# Patient Record
Sex: FEMALE | Race: WHITE | Employment: OTHER | URBAN - METROPOLITAN AREA
[De-identification: names, ages, dates, MRNs, and addresses within clinical notes are randomized per-mention and may not be internally consistent; named-entity substitution may affect disease eponyms.]

---

## 2017-02-17 ENCOUNTER — RADIANT APPOINTMENT (OUTPATIENT)
Dept: GENERAL RADIOLOGY | Facility: CLINIC | Age: 82
End: 2017-02-17
Attending: INTERNAL MEDICINE
Payer: MEDICARE

## 2017-02-17 ENCOUNTER — OFFICE VISIT (OUTPATIENT)
Dept: FAMILY MEDICINE | Facility: CLINIC | Age: 82
End: 2017-02-17
Payer: MEDICARE

## 2017-02-17 VITALS
OXYGEN SATURATION: 93 % | DIASTOLIC BLOOD PRESSURE: 66 MMHG | WEIGHT: 122 LBS | TEMPERATURE: 97.7 F | HEART RATE: 61 BPM | BODY MASS INDEX: 21.62 KG/M2 | HEIGHT: 63 IN | SYSTOLIC BLOOD PRESSURE: 140 MMHG

## 2017-02-17 DIAGNOSIS — I10 ESSENTIAL HYPERTENSION: ICD-10-CM

## 2017-02-17 DIAGNOSIS — R41.89 COGNITIVE IMPAIRMENT: ICD-10-CM

## 2017-02-17 DIAGNOSIS — G44.209 TENSION HEADACHE: ICD-10-CM

## 2017-02-17 DIAGNOSIS — Z00.00 ROUTINE GENERAL MEDICAL EXAMINATION AT A HEALTH CARE FACILITY: ICD-10-CM

## 2017-02-17 DIAGNOSIS — M81.0 SENILE OSTEOPOROSIS: ICD-10-CM

## 2017-02-17 DIAGNOSIS — I25.10 CORONARY ARTERY DISEASE INVOLVING NATIVE HEART WITHOUT ANGINA PECTORIS, UNSPECIFIED VESSEL OR LESION TYPE: ICD-10-CM

## 2017-02-17 DIAGNOSIS — E78.5 HYPERLIPIDEMIA LDL GOAL <70: ICD-10-CM

## 2017-02-17 DIAGNOSIS — Z00.00 ROUTINE GENERAL MEDICAL EXAMINATION AT A HEALTH CARE FACILITY: Primary | ICD-10-CM

## 2017-02-17 LAB
ERYTHROCYTE [DISTWIDTH] IN BLOOD BY AUTOMATED COUNT: 13.5 % (ref 10–15)
ERYTHROCYTE [SEDIMENTATION RATE] IN BLOOD BY WESTERGREN METHOD: 16 MM/H (ref 0–30)
HCT VFR BLD AUTO: 41.2 % (ref 35–47)
HGB BLD-MCNC: 13 G/DL (ref 11.7–15.7)
MCH RBC QN AUTO: 29.8 PG (ref 26.5–33)
MCHC RBC AUTO-ENTMCNC: 31.6 G/DL (ref 31.5–36.5)
MCV RBC AUTO: 95 FL (ref 78–100)
PLATELET # BLD AUTO: 270 10E9/L (ref 150–450)
RBC # BLD AUTO: 4.36 10E12/L (ref 3.8–5.2)
WBC # BLD AUTO: 5.2 10E9/L (ref 4–11)

## 2017-02-17 PROCEDURE — 80053 COMPREHEN METABOLIC PANEL: CPT | Performed by: INTERNAL MEDICINE

## 2017-02-17 PROCEDURE — 85652 RBC SED RATE AUTOMATED: CPT | Performed by: INTERNAL MEDICINE

## 2017-02-17 PROCEDURE — 36415 COLL VENOUS BLD VENIPUNCTURE: CPT | Performed by: INTERNAL MEDICINE

## 2017-02-17 PROCEDURE — 99212 OFFICE O/P EST SF 10 MIN: CPT | Mod: 25 | Performed by: INTERNAL MEDICINE

## 2017-02-17 PROCEDURE — G0438 PPPS, INITIAL VISIT: HCPCS | Performed by: INTERNAL MEDICINE

## 2017-02-17 PROCEDURE — 85027 COMPLETE CBC AUTOMATED: CPT | Performed by: INTERNAL MEDICINE

## 2017-02-17 PROCEDURE — 80061 LIPID PANEL: CPT | Performed by: INTERNAL MEDICINE

## 2017-02-17 PROCEDURE — 71020 XR CHEST 2 VW: CPT

## 2017-02-17 NOTE — PATIENT INSTRUCTIONS
Preventive Health Recommendations    Female Ages 65 +    Yearly exam:     See your health care provider every year in order to  o Review health changes.   o Discuss preventive care.    o Review your medicines if your doctor has prescribed any.      You no longer need a yearly Pap test unless you've had an abnormal Pap test in the past 10 years. If you have vaginal symptoms, such as bleeding or discharge, be sure to talk with your provider about a Pap test.      Every 1 to 2 years, have a mammogram.  If you are over 69, talk with your health care provider about whether or not you want to continue having screening mammograms.      Every 10 years, have a colonoscopy. Or, have a yearly FIT test (stool test). These exams will check for colon cancer.       Have a cholesterol test every 5 years, or more often if your doctor advises it.       Have a diabetes test (fasting glucose) every three years. If you are at risk for diabetes, you should have this test more often.       At age 65, have a bone density scan (DEXA) to check for osteoporosis (brittle bone disease).    Shots:    Get a flu shot each year.    Get a tetanus shot every 10 years.    Talk to your doctor about your pneumonia vaccines. There are now two you should receive - Pneumovax (PPSV 23) and Prevnar (PCV 13).    Talk to your doctor about the shingles vaccine.    Talk to your doctor about the hepatitis B vaccine.    Nutrition:     Eat at least 5 servings of fruits and vegetables each day.      Eat whole-grain bread, whole-wheat pasta and brown rice instead of white grains and rice.      Talk to your provider about Calcium and Vitamin D.     Lifestyle    Exercise at least 150 minutes a week (30 minutes a day, 5 days a week). This will help you control your weight and prevent disease.      Limit alcohol to one drink per day.      No smoking.       Wear sunscreen to prevent skin cancer.       See your dentist twice a year for an exam and cleaning.      See your  eye doctor every 1 to 2 years to screen for conditions such as glaucoma, macular degeneration and cataracts.    (Z00.00) Routine general medical examination at a health care facility  (primary encounter diagnosis)  Comment: For routine exam, we will draw labs as ordered, cholesterol, diabetes mellitus check, liver function, renal function,.  We will also update vaccination history  Plan: CBC with platelets, Lipid panel reflex to         direct LDL, Comprehensive metabolic panel, XR         Chest 2 Views            (E78.5) Hyperlipidemia LDL goal <70  Comment: We will check fasting lipid panel today  Plan:     (I25.10) Coronary artery disease involving native heart without angina pectoris, unspecified vessel or lesion type  Comment: Stable - no recent concerning chest pain  Plan:     (I10) Essential hypertension  Comment: blood pressure is a bit elevated today, but due to some orthostatic measurements at home we will keep blood pressure medications at the same dose lisinopril 10 mg and amlodipine 5  Plan:     (R41.89) Cognitive impairment  Comment: doing very well  Plan:     (M81.0) Senile osteoporosis  Comment: doing very well  Plan:     (G44.209) Tension headache  Comment: as above we will check ESR to monitor for possible temporal arteritis and continue to stay hydrated   Plan: ESR: Erythrocyte sedimentation rate

## 2017-02-17 NOTE — MR AVS SNAPSHOT
After Visit Summary   2/17/2017    Aura Broderick    MRN: [id number]           Patient Information     Date Of Birth          9/25/1926        Visit Information        Provider Department      2/17/2017 1:00 PM Mian Ramos MD [facility]        Today's Diagnoses     Routine general medical examination at a health care facility    -  1    Hyperlipidemia LDL goal <70        Coronary artery disease involving native heart without angina pectoris, unspecified vessel or lesion type        Essential hypertension        Cognitive impairment        Senile osteoporosis        Tension headache          Care Instructions      Preventive Health Recommendations    Female Ages 65 +    Yearly exam:     See your health care provider every year in order to  o Review health changes.   o Discuss preventive care.    o Review your medicines if your doctor has prescribed any.      You no longer need a yearly Pap test unless you've had an abnormal Pap test in the past 10 years. If you have vaginal symptoms, such as bleeding or discharge, be sure to talk with your provider about a Pap test.      Every 1 to 2 years, have a mammogram.  If you are over 69, talk with your health care provider about whether or not you want to continue having screening mammograms.      Every 10 years, have a colonoscopy. Or, have a yearly FIT test (stool test). These exams will check for colon cancer.       Have a cholesterol test every 5 years, or more often if your doctor advises it.       Have a diabetes test (fasting glucose) every three years. If you are at risk for diabetes, you should have this test more often.       At age 65, have a bone density scan (DEXA) to check for osteoporosis (brittle bone disease).    Shots:    Get a flu shot each year.    Get a tetanus shot every 10 years.    Talk to your doctor about your pneumonia vaccines. There are now two you should receive - Pneumovax (PPSV 23) and Prevnar (PCV  13).    Talk to your doctor about the shingles vaccine.    Talk to your doctor about the hepatitis B vaccine.    Nutrition:     Eat at least 5 servings of fruits and vegetables each day.      Eat whole-grain bread, whole-wheat pasta and brown rice instead of white grains and rice.      Talk to your provider about Calcium and Vitamin D.     Lifestyle    Exercise at least 150 minutes a week (30 minutes a day, 5 days a week). This will help you control your weight and prevent disease.      Limit alcohol to one drink per day.      No smoking.       Wear sunscreen to prevent skin cancer.       See your dentist twice a year for an exam and cleaning.      See your eye doctor every 1 to 2 years to screen for conditions such as glaucoma, macular degeneration and cataracts.    (Z00.00) Routine general medical examination at a health care facility  (primary encounter diagnosis)  Comment: For routine exam, we will draw labs as ordered, cholesterol, diabetes mellitus check, liver function, renal function,.  We will also update vaccination history  Plan: CBC with platelets, Lipid panel reflex to         direct LDL, Comprehensive metabolic panel, XR         Chest 2 Views            (E78.5) Hyperlipidemia LDL goal <70  Comment: We will check fasting lipid panel today  Plan:     (I25.10) Coronary artery disease involving native heart without angina pectoris, unspecified vessel or lesion type  Comment: Stable - no recent concerning chest pain  Plan:     (I10) Essential hypertension  Comment: blood pressure is a bit elevated today, but due to some orthostatic measurements at home we will keep blood pressure medications at the same dose lisinopril 10 mg and amlodipine 5  Plan:     (R41.89) Cognitive impairment  Comment: doing very well  Plan:     (M81.0) Senile osteoporosis  Comment: doing very well  Plan:     (G44.209) Tension headache  Comment: as above we will check ESR to monitor for possible temporal arteritis and continue to stay  "hydrated   Plan: ESR: Erythrocyte sedimentation rate                    Follow-ups after your visit        Future tests that were ordered for you today     Open Future Orders        Priority Expected Expires Ordered    XR Chest 2 Views Routine 2017            Who to contact     If you have questions or need follow up information about today's clinic visit or your schedule please contact [facility] directly at [phone].  Normal or non-critical lab and imaging results will be communicated to you by [employer], letter or phone within 4 business days after the clinic has received the results. If you do not hear from us within 7 days, please contact the clinic through [employer] or phone. If you have a critical or abnormal lab result, we will notify you by phone as soon as possible.  Submit refill requests through [employer] or call your pharmacy and they will forward the refill request to us. Please allow 3 business days for your refill to be completed.          Additional Information About Your Visit        [employer] Information     [employer]ika lets you send messages to your doctor, view your test results, renew your prescriptions, schedule appointments and more. To sign up, go to [email or web address] . Click on \"Log in\" on the left side of the screen, which will take you to the Welcome page. Then click on \"Sign up Now\" on the right side of the page.     You will be asked to enter the access code listed below, as well as some personal information. Please follow the directions to create your username and password.     Your access code is: [email or web address]  Expires: 2017  1:54 PM     Your access code will  in 90 days. If you need help or a new code, please call your [facility] clinic or [phone].        Care EveryWhere ID     This is your Care EveryWhere ID. This could be used by other organizations to access your [facility] medical records  [id number]        Your Vitals Were     " "Pulse Temperature Height Pulse Oximetry BMI (Body Mass Index)       61 97.7  F (36.5  C) (Tympanic) 5' 3\" (1.6 m) 93% 21.61 kg/m2        Blood Pressure from Last 3 Encounters:   02/17/17 140/66   05/25/16 176/78   04/12/16 114/62    Weight from Last 3 Encounters:   02/17/17 122 lb (55.3 kg)   05/25/16 116 lb (52.6 kg)   04/12/16 116 lb (52.6 kg)              We Performed the Following     CBC with platelets     Comprehensive metabolic panel     ESR: Erythrocyte sedimentation rate     Lipid panel reflex to direct LDL          Today's Medication Changes          These changes are accurate as of: 2/17/17  1:54 PM.  If you have any questions, ask your nurse or doctor.               These medicines have changed or have updated prescriptions.        Dose/Directions    lactobacillus rhamnosus (GG) capsule   This may have changed:  when to take this   Used for:  Diarrhea        Dose:  1 capsule   Take 1 capsule by mouth 3 times daily (before meals)   Quantity:  50 capsule   Refills:  1                Primary Care Provider Office Phone # Fax #    Mian Ramos [phone]       [facility] [address] [facility] [address]  [place] [address]        Thank you!     Thank you for choosing [facility]  for your care. Our goal is always to provide you with excellent care. Hearing back from our patients is one way we can continue to improve our services. Please take a few minutes to complete the written survey that you may receive in the mail after your visit with us. Thank you!             Your Updated Medication List - Protect others around you: Learn how to safely use, store and throw away your medicines at www.disposemymeds.org.          This list is accurate as of: 2/17/17  1:54 PM.  Always use your most recent med list.                   Brand Name Dispense Instructions for use    amLODIPine 5 MG tablet    NORVASC    90 tablet    Take 1 tablet (5 mg) by mouth daily       aspirin 81 MG EC " tablet     50 tablet    Take 1 tablet (81 mg) by mouth daily       lactobacillus rhamnosus (GG) capsule     50 capsule    Take 1 capsule by mouth 3 times daily (before meals)       lisinopril 10 MG tablet    PRINIVIL/ZESTRIL    90 tablet    TAKE 1 TABLET BY MOUTH EVERY DAY       multivitamin, therapeutic Tabs tablet      Take 1 tablet by mouth 2 times daily       senna-docusate 8.6-50 MG per tablet    SENOKOT-S;PERICOLACE     Take 1 tablet by mouth At Bedtime       simvastatin 40 MG tablet    ZOCOR    90 tablet    TAKE 1 TABLET(40 MG) BY MOUTH AT BEDTIME       VITAMIN D (CHOLECALCIFEROL) PO      Take 2,000 Units by mouth daily

## 2017-02-17 NOTE — LETTER
[facility]  [address] [facility] [address]  [place] [address]  [phone]                                                                                               Date: 2/20/2017    Aura Broderick                                                                               [address]  [place] [address]              Dear Aura,    I had the opportunity to review your recent labs and a summary of your labs reads as follows:    Your complete blood counts show no sign of anemia, normal white blood cell count and platelets.  Your comprehensive metabolic panel showed stable slightly impaired renal function, normal liver function, and stable fasting blood glucose indicating no evidence of diabetes mellitus.  Your fasting lipid panel show  - normal HDL (good) cholesterol -as your goal is greater than 40  - low LDL (bad) cholesterol as your goal is less than 70  - normal triglyceride levels  Your ESR also was within normal limits indicating no evidence of temporal arteritis  Enclosed is a copy of your results.      It was a pleasure to see you at your last appointment. If you have any questions, please feel free to call myself or my nurse at [phone].          Sincerely,    Mian Ramos MD/ Lakeisha MARTINEZ CMA  Results for orders placed or performed in visit on 02/17/17   CBC with platelets   Result Value Ref Range    WBC 5.2 4.0 - 11.0 10e9/L    RBC Count 4.36 3.8 - 5.2 10e12/L    Hemoglobin 13.0 11.7 - 15.7 g/dL    Hematocrit 41.2 35.0 - 47.0 %    MCV 95 78 - 100 fl    MCH 29.8 26.5 - 33.0 pg    MCHC 31.6 31.5 - 36.5 g/dL    RDW 13.5 10.0 - 15.0 %    Platelet Count 270 150 - 450 10e9/L   Lipid panel reflex to direct LDL   Result Value Ref Range    Cholesterol 150 <200 mg/dL    Triglycerides 81 <150 mg/dL    HDL Cholesterol 65 >49 mg/dL    LDL Cholesterol Calculated 69 <100 mg/dL    Non HDL Cholesterol 85 <130 mg/dL   Comprehensive metabolic panel   Result Value Ref Range     Sodium 142 133 - 144 mmol/L    Potassium 4.2 3.4 - 5.3 mmol/L    Chloride 106 94 - 109 mmol/L    Carbon Dioxide 25 20 - 32 mmol/L    Anion Gap 11 3 - 14 mmol/L    Glucose 104 (H) 70 - 99 mg/dL    Urea Nitrogen 40 (H) 7 - 30 mg/dL    Creatinine 1.26 (H) 0.52 - 1.04 mg/dL    GFR Estimate 40 (L) >60 mL/min/1.7m2    GFR Estimate If Black 48 (L) >60 mL/min/1.7m2    Calcium 9.3 8.5 - 10.1 mg/dL    Bilirubin Total 0.5 0.2 - 1.3 mg/dL    Albumin 4.0 3.4 - 5.0 g/dL    Protein Total 6.8 6.8 - 8.8 g/dL    Alkaline Phosphatase 78 40 - 150 U/L    ALT 15 0 - 50 U/L    AST 17 0 - 45 U/L   ESR: Erythrocyte sedimentation rate   Result Value Ref Range    Sed Rate 16 0 - 30 mm/h

## 2017-02-17 NOTE — NURSING NOTE
"Chief Complaint   Patient presents with     Wellness Visit       Initial /66 (BP Location: Left arm, Cuff Size: Adult Large)  Pulse 61  Temp 97.7  F (36.5  C) (Tympanic)  Ht 5' 3\" (1.6 m)  Wt 122 lb (55.3 kg)  SpO2 93%  BMI 21.61 kg/m2 Estimated body mass index is 21.61 kg/(m^2) as calculated from the following:    Height as of this encounter: 5' 3\" (1.6 m).    Weight as of this encounter: 122 lb (55.3 kg).  Medication Reconciliation: complete   Shante Verdin MA    "

## 2017-02-17 NOTE — PROGRESS NOTES
SUBJECTIVE:                                                            Aura Broderick is a 89 year old female who presents for Preventive Visit.      Are you in the first 12 months of your Medicare Part B coverage?  No    Healthy Habits:    Do you get at least three servings of calcium containing foods daily (dairy, green leafy vegetables, etc.)? no, taking calcium and/or vitamin D supplement: yes -     Amount of exercise or daily activities, outside of work: minimal    Problems taking medications regularly No    Medication side effects: No    Have you had an eye exam in the past two years? yes    Do you see a dentist twice per year? yes    Do you have sleep apnea, excessive snoring or daytime drowsiness?no    COGNITIVE SCREENING:  Not appropriate due to known cognitive impairment        All Histories reviewed and [facility] as appropriate.  Social History   Substance Use Topics     Smoking status: Former Smoker     Years: 10.00     Smokeless tobacco: Never Used     Alcohol use 0.0 oz/week     0 Standard drinks or equivalent per week      Comment: rare       The patient does not drink >3 drinks per day nor >7 drinks per week.    Today's PHQ-2 Score:   PHQ-2 ( 1999 Pfizer) 5/25/2016 4/12/2016   Q1: Little interest or pleasure in doing things 0 0   Q2: Feeling down, depressed or hopeless 0 0   PHQ-2 Score 0 0       Do you feel safe in your environment - Yes    Do you have a Health Care Directive?: Yes: Advance Directive has been received and scanned.    Current providers sharing in care for this patient include:   Patient Care Team:  Mian Ramos MD as PCP - General (Internal Medicine)      Hearing impairment: Yes,     Ability to successfully perform activities of daily living: No, needs assistance with: preparing meals and bathing     Fall risk:       Home safety:  none identified  Shante Verdin MA      The following health maintenance items are reviewed in Epic and correct as of  "today:  Health Maintenance   Topic Date Due     MAMMO Q1 YR INBASKET MESSAGE  07/09/2015     PNEUMOCOCCAL (2 of 2 - PPSV23) 11/20/2015     FALL RISK ASSESSMENT  05/25/2017     ADVANCE DIRECTIVE PLANNING Q5 YRS (NO INBASKET)  05/26/2020     TETANUS IMMUNIZATION (SYSTEM ASSIGNED)  11/20/2024     INFLUENZA VACCINE (SYSTEM ASSIGNED)  Completed        Hyperlipidemia LDL goal <70  Coronary artery disease involving native heart without angina pectoris, unspecified vessel or lesion type  Essential hypertension   Has had headache once per month, no vision changes.  No neck stiffness or pain.  No dizziness or vertigo.  Slight-hearing   Cognitive impairment   Memory has been stable, but occasional fatigue  Senile osteoporosis   She did have only 1 fall one year ago.  She is using a walker    ROS:  Constitutional, HEENT, cardiovascular, pulmonary, GI, , musculoskeletal, neuro, skin, endocrine and psych systems are negative, except as otherwise noted.    Problem list, Medication list, Allergies, and Medical/Social/Surgical histories reviewed in [facility] and updated as appropriate.  OBJECTIVE:                                                            /66 (BP Location: Left arm, Cuff Size: Adult Large)  Pulse 61  Temp 97.7  F (36.5  C) (Tympanic)  Ht 5' 3\" (1.6 m)  Wt 122 lb (55.3 kg)  SpO2 93%  BMI 21.61 kg/m2 Estimated body mass index is 21.61 kg/(m^2) as calculated from the following:    Height as of this encounter: 5' 3\" (1.6 m).    Weight as of this encounter: 122 lb (55.3 kg).  EXAM:   GENERAL: healthy, alert and no distress  EYES: Eyes grossly normal to inspection, PERRL and conjunctivae and sclerae normal  HENT: ear canals and TM's normal, nose and mouth without ulcers or lesions  NECK: no adenopathy, no asymmetry, masses, or scars and thyroid normal to palpation  RESP: lungs clear to auscultation - no rales, rhonchi or wheezes  BREAST: deferred  CV: regular rate and rhythm, normal S1 S2, no S3 or S4, no murmur, click " or rub, no peripheral edema and peripheral pulses strong  ABDOMEN: soft, nontender  MS: no gross musculoskeletal defects noted, no edema  SKIN: no suspicious lesions or rashes  NEURO: Normal strength and tone, mentation intact and speech normal  PSYCH: mentation appears normal, affect normal/bright    ASSESSMENT / PLAN:                                                            (Z00.00) Routine general medical examination at a health care facility  (primary encounter diagnosis)  Comment: For routine exam, we will draw labs as ordered, cholesterol, diabetes mellitus check, liver function, renal function,.  We will also update vaccination history  Plan: CBC with platelets, Lipid panel reflex to         direct LDL, Comprehensive metabolic panel, XR         Chest 2 Views            (E78.5) Hyperlipidemia LDL goal <70  Comment: We will check fasting lipid panel today  Plan:     (I25.10) Coronary artery disease involving native heart without angina pectoris, unspecified vessel or lesion type  Comment: Stable - no recent concerning chest pain  Plan:     (I10) Essential hypertension  Comment: blood pressure is a bit elevated today, but due to some orthostatic measurements at home we will keep blood pressure medications at the same dose lisinopril 10 mg and amlodipine 5  Plan:     (R41.89) Cognitive impairment  Comment: doing very well  Plan:     (M81.0) Senile osteoporosis  Comment: doing very well  Plan:     (G44.209) Tension headache  Comment: as above we will check ESR to monitor for possible temporal arteritis and continue to stay hydrated   Plan: ESR: Erythrocyte sedimentation rate           End of Life Planning:  Patient currently has an advanced directive: No.  I have verified the patient's ablity to prepare an advanced directive/make health care decisions.  Literature was provided to assist patient in preparing an advanced directive.    COUNSELING:  Reviewed preventive health counseling, as reflected in patient  "instructions        Estimated body mass index is 20.55 kg/(m^2) as calculated from the following:    Height as of 5/25/16: 5' 3\" (1.6 m).    Weight as of 5/25/16: 116 lb (52.6 kg).     reports that she has quit smoking. She quit after 10.00 years of use. She has never used smokeless tobacco.      Appropriate preventive services were discussed with this patient, including applicable screening as appropriate for cardiovascular disease, diabetes, osteopenia/osteoporosis, and glaucoma.  As appropriate for age/gender, discussed screening for colorectal cancer, prostate cancer, breast cancer, and cervical cancer. Checklist reviewing preventive services available has been given to the patient.    Reviewed patients plan of care and provided an AVS. The Basic Care Plan (routine screening as documented in Health Maintenance) for Aura meets the Care Plan requirement. This Care Plan has been established and reviewed with the Patient.    Counseling Resources:  ATP IV Guidelines  Pooled Cohorts Equation Calculator  Breast Cancer Risk Calculator  FRAX Risk Assessment  ICSI Preventive Guidelines  Dietary Guidelines for Americans, 2010  USDA's MyPlate  ASA Prophylaxis  Lung CA Screening    Mian Ramos MD, MD  [facility]  "

## 2017-02-18 LAB
ALBUMIN SERPL-MCNC: 4 G/DL (ref 3.4–5)
ALP SERPL-CCNC: 78 U/L (ref 40–150)
ALT SERPL W P-5'-P-CCNC: 15 U/L (ref 0–50)
ANION GAP SERPL CALCULATED.3IONS-SCNC: 11 MMOL/L (ref 3–14)
AST SERPL W P-5'-P-CCNC: 17 U/L (ref 0–45)
BILIRUB SERPL-MCNC: 0.5 MG/DL (ref 0.2–1.3)
BUN SERPL-MCNC: 40 MG/DL (ref 7–30)
CALCIUM SERPL-MCNC: 9.3 MG/DL (ref 8.5–10.1)
CHLORIDE SERPL-SCNC: 106 MMOL/L (ref 94–109)
CHOLEST SERPL-MCNC: 150 MG/DL
CO2 SERPL-SCNC: 25 MMOL/L (ref 20–32)
CREAT SERPL-MCNC: 1.26 MG/DL (ref 0.52–1.04)
GFR SERPL CREATININE-BSD FRML MDRD: 40 ML/MIN/1.7M2
GLUCOSE SERPL-MCNC: 104 MG/DL (ref 70–99)
HDLC SERPL-MCNC: 65 MG/DL
LDLC SERPL CALC-MCNC: 69 MG/DL
NONHDLC SERPL-MCNC: 85 MG/DL
POTASSIUM SERPL-SCNC: 4.2 MMOL/L (ref 3.4–5.3)
PROT SERPL-MCNC: 6.8 G/DL (ref 6.8–8.8)
SODIUM SERPL-SCNC: 142 MMOL/L (ref 133–144)
TRIGL SERPL-MCNC: 81 MG/DL

## 2017-05-18 DIAGNOSIS — E78.5 HYPERLIPIDEMIA LDL GOAL <70: ICD-10-CM

## 2017-05-18 RX ORDER — SIMVASTATIN 40 MG
TABLET ORAL
Qty: 90 TABLET | Refills: 2 | Status: SHIPPED | OUTPATIENT
Start: 2017-05-18 | End: 2018-01-01

## 2017-05-18 NOTE — TELEPHONE ENCOUNTER
simvastatin (ZOCOR) 40 MG tablet       Last Written Prescription Date: 8/22/2016  Last Fill Quantity: 90, # refills: 2  Last Office Visit with [facility] prescribing provider: 2/17/2017       Lab Results   Component Value Date    CHOL 150 02/17/2017     Lab Results   Component Value Date    HDL 65 02/17/2017     Lab Results   Component Value Date    LDL 69 02/17/2017     Lab Results   Component Value Date    TRIG 81 02/17/2017     Lab Results   Component Value Date    CHOLHDLRATIO 3.0 05/14/2014

## 2017-05-18 NOTE — TELEPHONE ENCOUNTER
Prescription approved per [facility] [place].  Lactation warning n/a  Oneyda Cancino RN

## 2017-05-31 ENCOUNTER — OFFICE VISIT (OUTPATIENT)
Dept: URGENT CARE | Facility: URGENT CARE | Age: 82
End: 2017-05-31
Payer: MEDICARE

## 2017-05-31 ENCOUNTER — TELEPHONE (OUTPATIENT)
Dept: FAMILY MEDICINE | Facility: CLINIC | Age: 82
End: 2017-05-31

## 2017-05-31 VITALS — HEART RATE: 64 BPM | DIASTOLIC BLOOD PRESSURE: 90 MMHG | SYSTOLIC BLOOD PRESSURE: 192 MMHG | OXYGEN SATURATION: 95 %

## 2017-05-31 DIAGNOSIS — I10 BENIGN ESSENTIAL HYPERTENSION: Primary | ICD-10-CM

## 2017-05-31 PROCEDURE — 99213 OFFICE O/P EST LOW 20 MIN: CPT

## 2017-05-31 NOTE — PATIENT INSTRUCTIONS
For tonight take a half tablet of lisinopril  (half would be 5 mg.) with your amlodipine tonight.     Have BP check tonight and in the morning     Follow-up with primary care physician about ongoing management of your blood pressure  Controlling High Blood Pressure  High blood pressure (hypertension) is called the silent killer. This is because many people who have it don t know it. High blood pressure is 140/90 or higher. Know your blood pressure and remember to check it regularly. Doing so can save your life. Here are some things you can do to help control your blood pressure.    Choose heart-healthy foods    Select low-salt, low-fat foods.    Limit canned, dried, cured, packaged, and fast foods. These can contain a lot of salt.    Eat 8 to 10 servings of  fruits and vegetables every day.    Choose lean meats, fish, or chicken.    Eat whole-grain pasta, brown rice, and beans.    Eat 2 to 3 servings of low-fat or fat-free dairy products    Ask your doctor about the DASH eating plan. This plan helps reduce blood pressure.  Maintain a healthy weight    Ask your health care provider how many calories to eat a day. Then stick to that number.    Ask your health care provider what weight range is healthiest for you. If you are overweight, a weight loss of only 3% to 5% of your body weight can help lower blood pressure.    Limit snacks and sweets.    Get regular exercise.  Get up and get active    Choose activities you enjoy. Find ones you can do with friends or family.    Park farther away from building entrances.    Use stairs instead of the elevator.    When you can, walk or bike instead of driving.    [place] leaves, garden, or do household repairs.    Be active at a moderate to vigorous level of physical activity for at least 40 minutes for a minimum of 3 to 4 days a week.   Manage stress    Make time to relax and enjoy life. Find time to laugh.    Visit with family and friends, and keep up with hobbies.  Limit alcohol  and quit smoking    Men should have no more than 2 drinks per day.    Women should have no more than 1 drink per day.    Talk with your health care provider about quitting smoking. Smoking increases your risk for heart disease and stroke. Ask about local or community programs that can help.  Medications  If lifestyle changes aren t enough, your health care provider may prescribe high blood pressure medicine. Take all medications as prescribed.     9196-5371 [employer]. [address] [place] [address], [place] [address]. All rights reserved. This information is not intended as a substitute for professional medical care. Always follow your healthcare professional's instructions.        What Is High Blood Pressure?  High blood pressure (also called hypertension) is known as the  silent killer.  This is because most of the time it doesn t cause symptoms. In fact, many people don t know they have it until other problems develop. In most cases, high blood pressure can t be cured. It s a disease that requires lifelong treatment. The good news is that it CAN be managed.  Understanding blood pressure  The circulatory system is made up of the heart and blood vessels that carry blood through the body. Your heart is the pump for this system. With each heartbeat (contraction), the heart sends blood out through large blood vessels called arteries. Blood pressure is a measure of how hard the moving blood pushes against the walls of the arteries.          High blood pressure can harm your health  High blood pressure makes the heart work harder to pump blood. Frequent high blood pressure can also cause changes in the artery walls. The walls thicken and become rough, which leads to a buildup of plaque (a fatty material). This can damage the arteries. It can also reduce blood flow through the artery. If blood pressure is not controlled, all these effects can lead to serious health problems. These include heart disease, heart  attack (also known as acute myocardial infarction, or AMI), stroke, kidney disease, and blindness.  Measuring blood pressure  An example of a blood pressure measurement is 120/70 (120 over 70). The top number is the pressure of blood against the artery walls during a heartbeat (systolic). The bottom number is the pressure of blood against artery walls between heartbeats (diastolic). Talk with your health care provider to find out what your blood pressure goals should be.   Controlling blood pressure  If your blood pressure is too high, work with your doctor on a plan for lowering it. Below are steps you can take that will help lower your blood pressure.    Choose heart-healthy foods. Eating healthier meals helps you control your blood pressure. Ask your doctor about the DASH eating plan. This plan helps reduce blood pressure by limiting the amount of sodium (salt) you have in your diet.     Maintain a healthy weight. Being overweight makes you more likely to have high blood pressure. Losing excess weight helps lower blood pressure.    Exercise regularly. Daily exercise helps your heart and blood vessels work better and stay healthier. It can help lower your blood pressure.    Stop smoking. Smoking increases blood pressure and damages blood vessels.    Limit alcohol. Drinking too much alcohol can raise blood pressure. Men should have no more than 2 drinks a day. Women should have no more than 1. (A drink is equal to 1 beer, or a small glass of wine, or a shot of liquor.)    Control stress. Stress makes your heart work harder and beat faster. Controlling stress helps you control your blood pressure.  Facts about high blood pressure    Feeling OK does not mean that blood pressure is under control. Likewise, feeling bad doesn t mean it s out of control. The only way to know for sure is to check your pressure regularly.    Medication is only one part of controlling high blood pressure. You also need to manage your  weight, get regular exercise, and adjust your eating habits.    High blood pressure is usually a lifelong problem. But it can be controlled with healthy lifestyle changes and medication.    Hypertension is not the same as stress. Although stress may be a factor in high blood pressure, it s only one part of the story.    Blood pressure medications need to be taken every day. Stopping suddenly may cause a dangerous increase in pressure.     5161-0760 [employer]. [address] [place] [address], [place] [address]. All rights reserved. This information is not intended as a substitute for professional medical care. Always follow your healthcare professional's instructions.

## 2017-05-31 NOTE — TELEPHONE ENCOUNTER
BP Readings from Last 3 Encounters:   02/17/17 140/66   05/25/16 176/78   04/12/16 114/62     Pt daughter calling about blood pressure.  No C2C on file for this Pt's daughter, but was able to take information from daughter.  Reporting bp 186/68, reports lightheadedness.  Yesterday pt seemed fine, today pt had a hard time getting from chair to bed without feeling week.    Pt does have history of UTI, but denies any urinary sx  Denies headaches, chest pain, sob.  Facility Nurse listened to lungs/heart - clear bilat.     Other new sx today include: Voice is raspy, dry throat, denies wheezing or sore throat.    reports pt has Rattle cough throughout day (unsure of duration).     Advised pt should be seen for lightheadedness/bp, and reported rattling cough by . Scheduled with team for tomorrow.  IF worsening sx, would need to be seen [facility]  Facility nurse will check BP again at 3pm, and family will call if it is elevated.     Routing to PCP for review - sx are not very clear, second hand report.  Would this pt need to be seen in [facility], or ok to wait for team tomorrow.  Oneyda Cancino RN

## 2017-05-31 NOTE — TELEPHONE ENCOUNTER
I think it may make sense for her to keep appointment tomorrow for now, but also go to urgent care    Mian Ramos MD

## 2017-05-31 NOTE — NURSING NOTE
"Chief Complaint   Patient presents with     Urgent Care     Hypertension     High blood pressure all day.        Initial /90 (BP Location: Right arm, Cuff Size: Adult Regular)  Pulse 64  SpO2 95%  Breastfeeding? No Estimated body mass index is 21.61 kg/(m^2) as calculated from the following:    Height as of 2/17/17: 5' 3\" (1.6 m).    Weight as of 2/17/17: 122 lb (55.3 kg).  Medication Reconciliation: complete.  JACK Lombardo      "

## 2017-05-31 NOTE — PROGRESS NOTES
SUBJECTIVE:    Chief Complaint   Patient presents with     Urgent Care     Hypertension     High blood pressure all day.        Aura Broderick is a 89 year old female who presents to the office with the CC of high blood pressure noticed when making blood pressure checks at  home blood pressure monitoring by nurse at her facility.  She had some lightheadedness this morning, but now is resolved.  Had BP check in the AM  About 150/80,  Then about 2 pm 180/80,   Then about 5 pm  190/88  She took her lisinopril 10 mg this am,  Has not taken the amlodipine 5 mg for her evening dose.    Says she has weekly BP checks at her facility usually at different times of the morning with usually 140-160/ 80-90        Patient reports no symptoms of headache, visual changes, nausea, vomiting, chest pain or shortness of breath at present    Cardiac risk factors: abnormal lipids, hypertension, post menopausal, Hx. Coronary artery disease         Past Medical History:   Diagnosis Date     Dizziness and giddiness      Essential hypertension, benign      Myocardial infarction (H)     LAD - stent - Katy Beckham     Other and unspecified hyperlipidemia      Palpitations      Pure hypercholesterolemia        ALLERGIES:  Contrast dye      Current Outpatient Prescriptions on File Prior to Visit:  simvastatin (ZOCOR) 40 MG tablet TAKE 1 TABLET(40 MG) BY MOUTH AT BEDTIME   lisinopril (PRINIVIL,ZESTRIL) 10 MG tablet TAKE 1 TABLET BY MOUTH EVERY DAY   amLODIPine (NORVASC) 5 MG tablet Take 1 tablet (5 mg) by mouth daily   aspirin 81 MG EC tablet Take 1 tablet (81 mg) by mouth daily   multivitamin, therapeutic (THERA-VIT) TABS Take 1 tablet by mouth 2 times daily   senna-docusate (SENOKOT-S;PERICOLACE) 8.6-50 MG per tablet Take 1 tablet by mouth At Bedtime   Lactobacillus Rhamnosus, GG, (CULTURELL) capsule Take 1 capsule by mouth 3 times daily (before meals) (Patient taking differently: Take 1 capsule by mouth daily )   VITAMIN D,  CHOLECALCIFEROL, PO Take 2,000 Units by mouth daily     No current facility-administered medications on file prior to visit.     Social History   Substance Use Topics     Smoking status: Former Smoker     Years: 10.00     Smokeless tobacco: Never Used     Alcohol use 0.0 oz/week     0 Standard drinks or equivalent per week      Comment: rare       Family History   Problem Relation Age of Onset     CANCER Mother      pancreatic     CANCER Brother      renal cell     HEART DISEASE Father 83     MI         ROS:INTEGUMENTARY/SKIN: NEGATIVE for worrisome rashes, moles or lesions  EYES: NEGATIVE for vision changes or irritation  RESP:NEGATIVE for significant cough or SOB  GI: NEGATIVE for nausea, abdominal pain, heartburn, or change in bowel habits    EXAM:  /90 (BP Location: Right arm, Cuff Size: Adult Regular)  Pulse 64  SpO2 95%  Breastfeeding? No   Repeat BP  182/ 76  GENERAL APPEARANCE: alert, mild distress and cooperative  EYES: EOMI,  PERRL, conjunctiva clear  HENT: ear canals and TM's normal.  Nose and mouth without ulcers, erythema or lesions  NECK: supple, nontender, no lymphadenopathy  RESP: lungs clear to auscultation - no rales, rhonchi or wheezes  CV: regular rates and rhythm, normal S1 S2, no murmur noted  ABDOMEN:  soft, nontender, no HSM or masses and bowel sounds normal  NEURO: Normal strength and tone, sensory exam grossly normal,  normal speech and mentation  SKIN: no suspicious lesions or rashes         Assess/  Plan    Benign essential hypertension      For tonight add a half tablet of lisinopril  (half tab is 5 mg) in addition to the usual amlodipine  Continue usual dosing of chronic anti-hypertensive medications     Recommend home monitoring of BP and maintaining a record of the BP readings to share with the primary care MD-   Has appt. With primary care tomorrow  Follow-up with primary care for ongoing monitoring and titrating of BP meds  Patient reassured that the current symptoms do not  require evaluation in the ED, however if there is hypertension  with   signs of persistent or worsening headache, visual changes, weakness, paralysis, seizures that urgent ER evaluation should be sought

## 2017-05-31 NOTE — TELEPHONE ENCOUNTER
I called and spoke with Magy  Advised to keep appointment tomorrow with Theodora.   If things worsen then take her in to     Magy agrees with plan. Facility RN will be taking BP again in 1 hour and see from there.     Veronica Paulino RN

## 2017-05-31 NOTE — MR AVS SNAPSHOT
After Visit Summary   5/31/2017    Aura Broderick    MRN: [id number]           Patient Information     Date Of Birth          9/25/1926        Visit Information        Provider Department      5/31/2017 5:20 PM  URGENT CARE [facility]        Care Instructions    For tonight take a half tablet of lisinopril  (half would be 5 mg.) with your amlodipine tonight.     Have BP check tonight and in the morning     Follow-up with primary care physician about ongoing management of your blood pressure  Controlling High Blood Pressure  High blood pressure (hypertension) is called the silent killer. This is because many people who have it don t know it. High blood pressure is 140/90 or higher. Know your blood pressure and remember to check it regularly. Doing so can save your life. Here are some things you can do to help control your blood pressure.    Choose heart-healthy foods    Select low-salt, low-fat foods.    Limit canned, dried, cured, packaged, and fast foods. These can contain a lot of salt.    Eat 8 to 10 servings of  fruits and vegetables every day.    Choose lean meats, fish, or chicken.    Eat whole-grain pasta, brown rice, and beans.    Eat 2 to 3 servings of low-fat or fat-free dairy products    Ask your doctor about the DASH eating plan. This plan helps reduce blood pressure.  Maintain a healthy weight    Ask your health care provider how many calories to eat a day. Then stick to that number.    Ask your health care provider what weight range is healthiest for you. If you are overweight, a weight loss of only 3% to 5% of your body weight can help lower blood pressure.    Limit snacks and sweets.    Get regular exercise.  Get up and get active    Choose activities you enjoy. Find ones you can do with friends or family.    Park farther away from building entrances.    Use stairs instead of the elevator.    When you can, walk or bike instead of driving.    Filer leaves,  garden, or do household repairs.    Be active at a moderate to vigorous level of physical activity for at least 40 minutes for a minimum of 3 to 4 days a week.   Manage stress    Make time to relax and enjoy life. Find time to laugh.    Visit with family and friends, and keep up with hobbies.  Limit alcohol and quit smoking    Men should have no more than 2 drinks per day.    Women should have no more than 1 drink per day.    Talk with your health care provider about quitting smoking. Smoking increases your risk for heart disease and stroke. Ask about local or community programs that can help.  Medications  If lifestyle changes aren t enough, your health care provider may prescribe high blood pressure medicine. Take all medications as prescribed.     7651-8962 [employer] [address] [place] [address], [place] [address]. All rights reserved. This information is not intended as a substitute for professional medical care. Always follow your healthcare professional's instructions.        What Is High Blood Pressure?  High blood pressure (also called hypertension) is known as the  silent killer.  This is because most of the time it doesn t cause symptoms. In fact, many people don t know they have it until other problems develop. In most cases, high blood pressure can t be cured. It s a disease that requires lifelong treatment. The good news is that it CAN be managed.  Understanding blood pressure  The circulatory system is made up of the heart and blood vessels that carry blood through the body. Your heart is the pump for this system. With each heartbeat (contraction), the heart sends blood out through large blood vessels called arteries. Blood pressure is a measure of how hard the moving blood pushes against the walls of the arteries.          High blood pressure can harm your health  High blood pressure makes the heart work harder to pump blood. Frequent high blood pressure can also cause changes in the artery  walls. The walls thicken and become rough, which leads to a buildup of plaque (a fatty material). This can damage the arteries. It can also reduce blood flow through the artery. If blood pressure is not controlled, all these effects can lead to serious health problems. These include heart disease, heart attack (also known as acute myocardial infarction, or AMI), stroke, kidney disease, and blindness.  Measuring blood pressure  An example of a blood pressure measurement is 120/70 (120 over 70). The top number is the pressure of blood against the artery walls during a heartbeat (systolic). The bottom number is the pressure of blood against artery walls between heartbeats (diastolic). Talk with your health care provider to find out what your blood pressure goals should be.   Controlling blood pressure  If your blood pressure is too high, work with your doctor on a plan for lowering it. Below are steps you can take that will help lower your blood pressure.    Choose heart-healthy foods. Eating healthier meals helps you control your blood pressure. Ask your doctor about the DASH eating plan. This plan helps reduce blood pressure by limiting the amount of sodium (salt) you have in your diet.     Maintain a healthy weight. Being overweight makes you more likely to have high blood pressure. Losing excess weight helps lower blood pressure.    Exercise regularly. Daily exercise helps your heart and blood vessels work better and stay healthier. It can help lower your blood pressure.    Stop smoking. Smoking increases blood pressure and damages blood vessels.    Limit alcohol. Drinking too much alcohol can raise blood pressure. Men should have no more than 2 drinks a day. Women should have no more than 1. (A drink is equal to 1 beer, or a small glass of wine, or a shot of liquor.)    Control stress. Stress makes your heart work harder and beat faster. Controlling stress helps you control your blood pressure.  Facts about high  blood pressure    Feeling OK does not mean that blood pressure is under control. Likewise, feeling bad doesn t mean it s out of control. The only way to know for sure is to check your pressure regularly.    Medication is only one part of controlling high blood pressure. You also need to manage your weight, get regular exercise, and adjust your eating habits.    High blood pressure is usually a lifelong problem. But it can be controlled with healthy lifestyle changes and medication.    Hypertension is not the same as stress. Although stress may be a factor in high blood pressure, it s only one part of the story.    Blood pressure medications need to be taken every day. Stopping suddenly may cause a dangerous increase in pressure.     5432-1732 [employer]. [address] [place] [address], [place] [address]. All rights reserved. This information is not intended as a substitute for professional medical care. Always follow your healthcare professional's instructions.                Follow-ups after your visit        Your next 10 appointments already scheduled     Jun 01, 2017 11:30 AM CDT   Office Visit with RODY Blanco CNP   [facility]    [address] [place] [id number] [phone]           Bring a current list of meds and any records pertaining to this visit.  For Physicals, please bring immunization records and any forms needing to be filled out.  Please arrive 10 minutes early to complete paperwork.              Who to contact     If you have questions or need follow up information about today's clinic visit or your schedule please contact [facility] directly at [phone].  Normal or non-critical lab and imaging results will be communicated to you by MyChart, letter or phone within 4 business days after the clinic has received the results. If you do not hear from us within 7 days, please contact the clinic through  "MyChart or phone. If you have a critical or abnormal lab result, we will notify you by phone as soon as possible.  Submit refill requests through [facility] or call your pharmacy and they will forward the refill request to us. Please allow 3 business days for your refill to be completed.          Additional Information About Your Visit        [employer] Information     [facility] lets you send messages to your doctor, view your test results, renew your prescriptions, schedule appointments and more. To sign up, go to [email or web address] . Click on \"Log in\" on the left side of the screen, which will take you to the Welcome page. Then click on \"Sign up Now\" on the right side of the page.     You will be asked to enter the access code listed below, as well as some personal information. Please follow the directions to create your username and password.     Your access code is: [email or web address]  Expires: 2017  5:58 PM     Your access code will  in 90 days. If you need help or a new code, please call your [facility] clinic or [phone].        Care EveryWhere ID     This is your Care EveryWhere ID. This could be used by other organizations to access your [facility] medical records  [id number]        Your Vitals Were     Pulse Pulse Oximetry Breastfeeding?             64 95% No          Blood Pressure from Last 3 Encounters:   17 192/90   17 140/66   16 176/78    Weight from Last 3 Encounters:   17 122 lb (55.3 kg)   16 116 lb (52.6 kg)   16 116 lb (52.6 kg)              Today, you had the following     No orders found for display         Today's Medication Changes          These changes are accurate as of: 17  5:58 PM.  If you have any questions, ask your nurse or doctor.               These medicines have changed or have updated prescriptions.        Dose/Directions    lactobacillus rhamnosus (GG) capsule   This may have changed:  when to take this   Used for:  Diarrhea        " Dose:  1 capsule   Take 1 capsule by mouth 3 times daily (before meals)   Quantity:  50 capsule   Refills:  1                Primary Care Provider Office Phone # Fax #    Mian Ramos [phone]       [facility] [address] [facility] [address]  [facility] [address]        Thank you!     Thank you for choosing [facility]  for your care. Our goal is always to provide you with excellent care. Hearing back from our patients is one way we can continue to improve our services. Please take a few minutes to complete the written survey that you may receive in the mail after your visit with us. Thank you!             Your Updated Medication List - Protect others around you: Learn how to safely use, store and throw away your medicines at www.disposemymeds.org.          This list is accurate as of: 5/31/17  5:58 PM.  Always use your most recent med list.                   Brand Name Dispense Instructions for use    amLODIPine 5 MG tablet    NORVASC    90 tablet    Take 1 tablet (5 mg) by mouth daily       aspirin 81 MG EC tablet     50 tablet    Take 1 tablet (81 mg) by mouth daily       lactobacillus rhamnosus (GG) capsule     50 capsule    Take 1 capsule by mouth 3 times daily (before meals)       lisinopril 10 MG tablet    PRINIVIL/ZESTRIL    90 tablet    TAKE 1 TABLET BY MOUTH EVERY DAY       multivitamin, therapeutic Tabs tablet      Take 1 tablet by mouth 2 times daily       senna-docusate 8.6-50 MG per tablet    SENOKOT-S;PERICOLACE     Take 1 tablet by mouth At Bedtime       simvastatin 40 MG tablet    ZOCOR    90 tablet    TAKE 1 TABLET(40 MG) BY MOUTH AT BEDTIME       VITAMIN D (CHOLECALCIFEROL) PO      Take 2,000 Units by mouth daily

## 2017-05-31 NOTE — TELEPHONE ENCOUNTER
Pt's daughter Magy called back in to report the Pt's BP has increased since last conversation.   Current BP is 188/82. I did advise UC evaluation this evening. Pt and daughter agree with plan of care.     Johana Wiseman RN

## 2017-05-31 NOTE — TELEPHONE ENCOUNTER
Reason for Call:  Appointment    Detailed comments: Patient's daughter in law Magy called and said the Nurse when she  Lives took Aura's BP and it was 186/68 and suggested she come in and be seen, pt has\  Been feeling light headed    Phone Number Patient can be reached at: [phone]    Best Time: anytime    Can we leave a detailed message on this number? YES    Call taken on 5/31/2017 at 11:26 AM by Deejay Lau

## 2017-06-01 ENCOUNTER — OFFICE VISIT (OUTPATIENT)
Dept: FAMILY MEDICINE | Facility: CLINIC | Age: 82
End: 2017-06-01
Payer: MEDICARE

## 2017-06-01 VITALS
OXYGEN SATURATION: 95 % | DIASTOLIC BLOOD PRESSURE: 70 MMHG | WEIGHT: 117.7 LBS | HEART RATE: 62 BPM | HEIGHT: 63 IN | SYSTOLIC BLOOD PRESSURE: 168 MMHG | TEMPERATURE: 97.2 F | BODY MASS INDEX: 20.86 KG/M2

## 2017-06-01 DIAGNOSIS — I10 ESSENTIAL HYPERTENSION: Primary | ICD-10-CM

## 2017-06-01 PROCEDURE — 99213 OFFICE O/P EST LOW 20 MIN: CPT | Performed by: NURSE PRACTITIONER

## 2017-06-01 RX ORDER — LISINOPRIL 5 MG/1
5 TABLET ORAL DAILY
Qty: 90 TABLET | Refills: 1 | Status: SHIPPED | OUTPATIENT
Start: 2017-06-01 | End: 2017-06-16

## 2017-06-01 NOTE — NURSING NOTE
"Chief Complaint   Patient presents with     Follow Up For     BP       Initial /70 (BP Location: Right arm, Patient Position: Chair, Cuff Size: Adult Regular)  Pulse 62  Temp 97.2  F (36.2  C) (Oral)  Ht 5' 3\" (1.6 m)  Wt 117 lb 11.2 oz (53.4 kg)  SpO2 95%  BMI 20.85 kg/m2 Estimated body mass index is 20.85 kg/(m^2) as calculated from the following:    Height as of this encounter: 5' 3\" (1.6 m).    Weight as of this encounter: 117 lb 11.2 oz (53.4 kg).  Medication Reconciliation: complete   Lori Underwood MA  "

## 2017-06-01 NOTE — PATIENT INSTRUCTIONS
Please return in about 2 weeks for a recheck with Dr Ramos  Call us if any new symptoms develop   Monitor your blood pressure just once per day would be sufficient. If you get symptoms, please take your blood pressure     Take a total of 15mg Lisinopril. I sent the 5mg pill to Adriana

## 2017-06-01 NOTE — PROGRESS NOTES
HPI    SUBJECTIVE:                                                    Aura Broderick is a 89 year old female who presents to clinic today for the following health issues:    Recheck elevated BP      Yesterday when she woke up she didn't feel right and her BP was 140/60  Then later BP was 180/65 at noon   At 6pm was 192/90  At 7pm took an additional 5mg and then BP was 148/77   This morning was 126/79 then 136/70  Denies HA, CP,   Occasional SOB 6-8 months when she is working around the house, but not with walking   Did well walking into the clinic today   Was on lisinopril 20mg for a long time, then decreased it to 10mg as she was feeling slightly faint awhile ago. Has been taking both Lisinopril and amlodipine at night        Past Medical History:   Diagnosis Date     Dizziness and giddiness      Essential hypertension, benign      Myocardial infarction (H)     [facility]     Other and unspecified hyperlipidemia      Palpitations      Pure hypercholesterolemia      Past Surgical History:   Procedure Laterality Date     C NONSPECIFIC PROCEDURE      tonsillectomy     C NONSPECIFIC PROCEDURE      D&C     C NONSPECIFIC PROCEDURE  06-7-99    COLONOSCOPY      CLOSED REDUCTION, PERCUTANEOUS PINNING HIP Left 3/16/2016    Procedure: CLOSED REDUCTION, PERCUTANEOUS PINNING HIP;  Surgeon: Sukh Kebede MD;  Location:  OR     CORONARY ANGIOGRAPHY ADULT ORDER  2008-[facility]    stent     Social History   Substance Use Topics     Smoking status: Former Smoker     Years: 10.00     Smokeless tobacco: Never Used     Alcohol use 0.0 oz/week     0 Standard drinks or equivalent per week      Comment: rare     Current Outpatient Prescriptions   Medication Sig Dispense Refill     lisinopril (PRINIVIL/ZESTRIL) 5 MG tablet Take 1 tablet (5 mg) by mouth daily In addition to 10mg tablet to total 15mg 90 tablet 1     simvastatin (ZOCOR) 40 MG tablet TAKE 1 TABLET(40 MG) BY MOUTH AT BEDTIME 90 tablet 2      "lisinopril (PRINIVIL,ZESTRIL) 10 MG tablet TAKE 1 TABLET BY MOUTH EVERY DAY 90 tablet 3     amLODIPine (NORVASC) 5 MG tablet Take 1 tablet (5 mg) by mouth daily 90 tablet 3     aspirin 81 MG EC tablet Take 1 tablet (81 mg) by mouth daily 50 tablet 1     multivitamin, therapeutic (THERA-VIT) TABS Take 1 tablet by mouth 2 times daily       senna-docusate (SENOKOT-S;PERICOLACE) 8.6-50 MG per tablet Take 1 tablet by mouth At Bedtime       Lactobacillus Rhamnosus, GG, (CULTURELL) capsule Take 1 capsule by mouth 3 times daily (before meals) (Patient taking differently: Take 1 capsule by mouth daily ) 50 capsule 1     VITAMIN D, CHOLECALCIFEROL, PO Take 2,000 Units by mouth daily       Allergies   Allergen Reactions     Contrast Dye        Reviewed PMH, med list and allergies.      ROS  Detailed as above       /70 (BP Location: Right arm, Patient Position: Chair, Cuff Size: Adult Regular)  Pulse 62  Temp 97.2  F (36.2  C) (Oral)  Ht 5' 3\" (1.6 m)  Wt 117 lb 11.2 oz (53.4 kg)  SpO2 95%  BMI 20.85 kg/m2      Physical Exam   Constitutional: She is well-developed, well-nourished, and in no distress.   HENT:   Head: Normocephalic.   Eyes: Conjunctivae are normal.   Cardiovascular: Normal rate, regular rhythm and normal heart sounds.    No murmur heard.  Pulmonary/Chest: Effort normal.   Neurological: She is alert.   Psychiatric: Mood and affect normal.   Vitals reviewed.    Assessment and Plan:       ICD-10-CM    1. Essential hypertension I10 lisinopril (PRINIVIL/ZESTRIL) 5 MG tablet       Asymptomatic waxing and waning elevated BP   Will trial a small increase of lisinopril and close monitoring at home with PCP f/u     Patient Instructions   Please return in about 2 weeks for a recheck with Dr Ramos  Call us if any new symptoms develop   Monitor your blood pressure just once per day would be sufficient. If you get symptoms, please take your blood pressure     Take a total of 15mg Lisinopril. I sent the 5mg pill " to Adriana Quinn, APRN, CNP  [facility]

## 2017-06-02 ENCOUNTER — TELEPHONE (OUTPATIENT)
Dept: FAMILY MEDICINE | Facility: CLINIC | Age: 82
End: 2017-06-02

## 2017-06-02 NOTE — TELEPHONE ENCOUNTER
Reason for Call:  Other BP Questions    Detailed comments: John MONTENEGRO called to ask if there are parameters for the daily BP checks that they need to report    Phone Number Patient can be reached at:  [phone] John MONTENEGRO    Best Time: anytime    Can we leave a detailed message on this number? YES    Call taken on 6/2/2017 at 12:36 PM by Kimmy Sandoval

## 2017-06-02 NOTE — TELEPHONE ENCOUNTER
PCP:    Patient had appointment with Cordell Quinn yesterday regarding her BP.     Home Health Nurse wants to know if there are parameters for her BP checks that they need to report.    Please advise  Thank you    Tori Hunter RN

## 2017-06-02 NOTE — TELEPHONE ENCOUNTER
Home care notified with information noted below from provider and agrees with plan.  Toshia Ruiz RN  [employer]

## 2017-06-16 ENCOUNTER — OFFICE VISIT (OUTPATIENT)
Dept: FAMILY MEDICINE | Facility: CLINIC | Age: 82
End: 2017-06-16
Payer: MEDICARE

## 2017-06-16 VITALS
HEART RATE: 62 BPM | BODY MASS INDEX: 21.33 KG/M2 | SYSTOLIC BLOOD PRESSURE: 118 MMHG | DIASTOLIC BLOOD PRESSURE: 62 MMHG | OXYGEN SATURATION: 95 % | HEIGHT: 63 IN | WEIGHT: 120.4 LBS

## 2017-06-16 DIAGNOSIS — I10 ESSENTIAL HYPERTENSION: Primary | ICD-10-CM

## 2017-06-16 PROCEDURE — 99213 OFFICE O/P EST LOW 20 MIN: CPT | Performed by: INTERNAL MEDICINE

## 2017-06-16 PROCEDURE — 36415 COLL VENOUS BLD VENIPUNCTURE: CPT | Performed by: INTERNAL MEDICINE

## 2017-06-16 PROCEDURE — 80048 BASIC METABOLIC PNL TOTAL CA: CPT | Performed by: INTERNAL MEDICINE

## 2017-06-16 RX ORDER — LISINOPRIL 5 MG/1
5 TABLET ORAL DAILY
Qty: 90 TABLET | Refills: 3 | Status: SHIPPED | OUTPATIENT
Start: 2017-06-16 | End: 2018-01-01

## 2017-06-16 RX ORDER — AMLODIPINE BESYLATE 5 MG/1
5 TABLET ORAL DAILY
Qty: 90 TABLET | Refills: 3 | Status: SHIPPED | OUTPATIENT
Start: 2017-06-16 | End: 2018-01-01

## 2017-06-16 RX ORDER — LISINOPRIL 10 MG/1
10 TABLET ORAL DAILY
Qty: 90 TABLET | Refills: 3 | Status: SHIPPED | OUTPATIENT
Start: 2017-06-16 | End: 2018-01-01

## 2017-06-16 NOTE — PROGRESS NOTES
"[facility]  CLINIC PROGRESS NOTE    Subjective:  Hypertension   Aura Broderick returns accompanied by her daughter for follow up of her blood pressure.  She is not taking any ibuprofen or NSAIDs.  She has been checking her blood pressure daily for the past two weeks and blood pressure has fluctuated, but more recently has been lower.   She is getting her meals regularly and notes that she could drink more fluids.  Her  is no longer administering her blood pressure medications and this responsibility has been transitioned to staff at her assisted living.    Past medical history, medications, allergies, social history, family history reviewed and updated in [facility] as of 6/16/2017 .    ROS  CONSTITUTIONAL: no fatigue, no unexpected change in weight  SKIN: no worrisome rashes, no worrisome moles, no worrisome lesions  EYES: no acute vision problems or changes  ENT: no ear problems, no mouth problems, no throat problems  RESP: no significant cough, no shortness of breath  CV: no chest pain, no palpitations, no new or worsening peripheral edema  GI: no nausea, no vomiting, no constipation, no diarrhea  : no frequency, no dysuria, no hematuria  MS: no claudication, no myalgias, no joint aches  PSYCHIATRIC: no changes in mood or affect      Objective:  Vitals  /62 (BP Location: Left arm, Patient Position: Chair, Cuff Size: Adult Regular)  Pulse 62  Ht 5' 3\" (1.6 m)  Wt 120 lb 6.4 oz (54.6 kg)  SpO2 95%  Breastfeeding? No  BMI 21.33 kg/m2  GEN: Alert Oriented x3 NAD - cognitive impairment appreciated  HEENT: Atraumatic, normocephalic, neck supple, no thyromegaly, negative cervical adenopathy  TM: TM bilaterally pearly and grey with normal light reflex  CV: RRR no murmurs or rubs  PULM: CTA no wheezes or crackles  ABD: Soft, nontender nondistended, no hepatosplenomegally  SKIN: No visible skin lesion or ulcerations  EXT: No edema bilateral lower extremities  NEURO: Gait and station deferred, No " focal neurologic deficits  PSYCH: Mood good, affect mood congruent    No results found for this or any previous visit (from the past 24 hour(s)).    Assessment/Plan:  Patient Instructions   (I10) Essential hypertension  (primary encounter diagnosis)  Comment: Continue to take blood pressure medications as you have been.  We will check labs today and follow up in 3 months.  Monitor for side effects of swelling in your legs and lower blood pressure readings and continue weekly blood pressure checks.  Plan: Basic metabolic panel, amLODIPine (NORVASC) 5         MG tablet, lisinopril (PRINIVIL/ZESTRIL) 5 MG         tablet, lisinopril (PRINIVIL/ZESTRIL) 10 MG         tablet               Follow up in 6 months    Disclaimer: This note consists of symbols derived from keyboarding, dictation and/or voice recognition software. As a result, there may be errors in the script that have gone undetected. Please consider this when interpreting information found in this chart.    Mian Ramos MD  [phone]

## 2017-06-16 NOTE — MR AVS SNAPSHOT
"              After Visit Summary   6/16/2017    Aura Broderick    MRN: [id number]           Patient Information     Date Of Birth          9/25/1926        Visit Information        Provider Department      6/16/2017 10:30 AM Mian Ramos MD [facility]        Today's Diagnoses     Essential hypertension    -  1      Care Instructions    (I10) Essential hypertension  (primary encounter diagnosis)  Comment: Continue to take blood pressure medications as you have been.  We will check labs today and follow up in 3 months.  Monitor for side effects of swelling in your legs and lower blood pressure readings and continue weekly blood pressure checks.  Plan: Basic metabolic panel, amLODIPine (NORVASC) 5         MG tablet, lisinopril (PRINIVIL/ZESTRIL) 5 MG         tablet, lisinopril (PRINIVIL/ZESTRIL) 10 MG         tablet                     Follow-ups after your visit        Who to contact     If you have questions or need follow up information about today's clinic visit or your schedule please contact [facility] directly at [phone].  Normal or non-critical lab and imaging results will be communicated to you by MyChart, letter or phone within 4 business days after the clinic has received the results. If you do not hear from us within 7 days, please contact the clinic through MyChart or phone. If you have a critical or abnormal lab result, we will notify you by phone as soon as possible.  Submit refill requests through [employer] or call your pharmacy and they will forward the refill request to us. Please allow 3 business days for your refill to be completed.          Additional Information About Your Visit        Care EveryWhere ID     This is your Care EveryWhere ID. This could be used by other organizations to access your [place] medical records  [id number]        Your Vitals Were     Pulse Height Pulse Oximetry Breastfeeding? BMI (Body Mass Index)       62 5' 3\" (1.6 m) 95% No " 21.33 kg/m2        Blood Pressure from Last 3 Encounters:   06/16/17 118/62   06/01/17 168/70   05/31/17 192/90    Weight from Last 3 Encounters:   06/16/17 120 lb 6.4 oz (54.6 kg)   06/01/17 117 lb 11.2 oz (53.4 kg)   02/17/17 122 lb (55.3 kg)              We Performed the Following     Basic metabolic panel          Today's Medication Changes          These changes are accurate as of: 6/16/17 11:00 AM.  If you have any questions, ask your nurse or doctor.               These medicines have changed or have updated prescriptions.        Dose/Directions    amLODIPine 5 MG tablet   Commonly known as:  NORVASC   This may have changed:  See the new instructions.   Used for:  Essential hypertension   Changed by:  Mian Ramos MD        Dose:  5 mg   Take 1 tablet (5 mg) by mouth daily   Quantity:  90 tablet   Refills:  3       lactobacillus rhamnosus (GG) capsule   This may have changed:  when to take this   Used for:  Diarrhea        Dose:  1 capsule   Take 1 capsule by mouth 3 times daily (before meals)   Quantity:  50 capsule   Refills:  1       * lisinopril 5 MG tablet   Commonly known as:  PRINIVIL/ZESTRIL   This may have changed:  Another medication with the same name was changed. Make sure you understand how and when to take each.   Used for:  Essential hypertension   Changed by:  Mian Ramos MD        Dose:  5 mg   Take 1 tablet (5 mg) by mouth daily In addition to 10mg tablet to total 15mg   Quantity:  90 tablet   Refills:  3       * lisinopril 10 MG tablet   Commonly known as:  PRINIVIL/ZESTRIL   This may have changed:  See the new instructions.   Used for:  Essential hypertension   Changed by:  Mian Ramos MD        Dose:  10 mg   Take 1 tablet (10 mg) by mouth daily   Quantity:  90 tablet   Refills:  3       * Notice:  This list has 2 medication(s) that are the same as other medications prescribed for you. Read the directions carefully, and ask your doctor or other care  provider to review them with you.         Where to get your medicines      These medications were sent to [id number] - [place], [place] - [address] & [facility]  [address] [address]    Hours:  24-hours Phone:  [phone]     amLODIPine 5 MG tablet    lisinopril 10 MG tablet    lisinopril 5 MG tablet                Primary Care Provider Office Phone # Fax #    Mian Ramos [phone]       [facility] [address]  [place] [address]        Thank you!     Thank you for choosing [facility]  for your care. Our goal is always to provide you with excellent care. Hearing back from our patients is one way we can continue to improve our services. Please take a few minutes to complete the written survey that you may receive in the mail after your visit with us. Thank you!             Your Updated Medication List - Protect others around you: Learn how to safely use, store and throw away your medicines at www.disposemymeds.org.          This list is accurate as of: 6/16/17 11:00 AM.  Always use your most recent med list.                   Brand Name Dispense Instructions for use    amLODIPine 5 MG tablet    NORVASC    90 tablet    Take 1 tablet (5 mg) by mouth daily       aspirin 81 MG EC tablet     50 tablet    Take 1 tablet (81 mg) by mouth daily       lactobacillus rhamnosus (GG) capsule     50 capsule    Take 1 capsule by mouth 3 times daily (before meals)       * lisinopril 5 MG tablet    PRINIVIL/ZESTRIL    90 tablet    Take 1 tablet (5 mg) by mouth daily In addition to 10mg tablet to total 15mg       * lisinopril 10 MG tablet    PRINIVIL/ZESTRIL    90 tablet    Take 1 tablet (10 mg) by mouth daily       multivitamin, therapeutic Tabs tablet      Take 1 tablet by mouth 2 times daily       senna-docusate 8.6-50 MG per tablet    SENOKOT-S;PERICOLACE     Take 1 tablet by mouth At Bedtime       simvastatin 40 MG  tablet    ZOCOR    90 tablet    TAKE 1 TABLET(40 MG) BY MOUTH AT BEDTIME       VITAMIN D (CHOLECALCIFEROL) PO      Take 2,000 Units by mouth daily       * Notice:  This list has 2 medication(s) that are the same as other medications prescribed for you. Read the directions carefully, and ask your doctor or other care provider to review them with you.

## 2017-06-16 NOTE — LETTER
[facility]  [address] [facility]  [address]  [place]  [address]  Tel: [phone]    June 19, 2017    Aura Broderick  [address]  [facility] [address]        Dear MsElif Broderick,    Your basic metabolic panel shows an impaired but stable renal function. We should plan to follow-up in the next 2-3 months as discussed    If you have any further questions or problems, please contact our office.      Sincerely,    Mian Ramos MD/william    Results for orders placed or performed in visit on 06/16/17   Basic metabolic panel   Result Value Ref Range    Sodium 141 133 - 144 mmol/L    Potassium 4.0 3.4 - 5.3 mmol/L    Chloride 104 94 - 109 mmol/L    Carbon Dioxide 29 20 - 32 mmol/L    Anion Gap 8 3 - 14 mmol/L    Glucose 89 70 - 99 mg/dL    Urea Nitrogen 33 (H) 7 - 30 mg/dL    Creatinine 1.33 (H) 0.52 - 1.04 mg/dL    GFR Estimate 37 (L) >60 mL/min/1.7m2    GFR Estimate If Black 45 (L) >60 mL/min/1.7m2    Calcium 9.1 8.5 - 10.1 mg/dL           Enclosure: Lab Results

## 2017-06-17 LAB
ANION GAP SERPL CALCULATED.3IONS-SCNC: 8 MMOL/L (ref 3–14)
BUN SERPL-MCNC: 33 MG/DL (ref 7–30)
CALCIUM SERPL-MCNC: 9.1 MG/DL (ref 8.5–10.1)
CHLORIDE SERPL-SCNC: 104 MMOL/L (ref 94–109)
CO2 SERPL-SCNC: 29 MMOL/L (ref 20–32)
CREAT SERPL-MCNC: 1.33 MG/DL (ref 0.52–1.04)
GFR SERPL CREATININE-BSD FRML MDRD: 37 ML/MIN/1.7M2
GLUCOSE SERPL-MCNC: 89 MG/DL (ref 70–99)
POTASSIUM SERPL-SCNC: 4 MMOL/L (ref 3.4–5.3)
SODIUM SERPL-SCNC: 141 MMOL/L (ref 133–144)

## 2017-06-24 ENCOUNTER — HEALTH MAINTENANCE LETTER (OUTPATIENT)
Age: 82
End: 2017-06-24

## 2017-08-09 ENCOUNTER — TELEPHONE (OUTPATIENT)
Dept: FAMILY MEDICINE | Facility: CLINIC | Age: 82
End: 2017-08-09

## 2017-08-09 NOTE — TELEPHONE ENCOUNTER
Homecare requested on both  and wife for OT evals. Pt not recently seen for this.  Christophe already given ok on this per below.  Was seen for HTN, no other notes on last OV, no F2F completed.   Family requested homecare for OT eval for cog and maybe PT.     Reviewed that needs F2F for medicare within 30 days, scheduled on Wed, double booked with  being seen for same thing.  Please advise if not needing to be seen and PCP to document F2F based on last visit.   and daughter already coming to clinic.    Magy Hewitt for scheduling [facility] [phone]    Oneyda Cancino RN

## 2017-08-09 NOTE — TELEPHONE ENCOUNTER
Verbal orders given to the below orders.  She will fax orders for PCP signature.  Merline Avina RN

## 2017-08-09 NOTE — TELEPHONE ENCOUNTER
Reason for Call: Request for an order or referral:    Order or referral being requested: OT, speech, eval and treat, connotive difficulty process info    Date needed: as soon as possible    Has the patient been seen by the PCP for this problem? YES    Additional comments: n/a    Phone number Patient can be reached at:  Home number on file [phone] (home)    Best Time:  anyitme    Can we leave a detailed message on this number?  YES    Call taken on 8/9/2017 at 2:56 PM by Ninoska Lomax

## 2017-08-10 NOTE — TELEPHONE ENCOUNTER
Left detailed message with Magy letting her know that Dr. Ramos is advising to keep both appointments in order to document face to face visit.     Johana Wiseman RN

## 2017-08-16 ENCOUNTER — OFFICE VISIT (OUTPATIENT)
Dept: FAMILY MEDICINE | Facility: CLINIC | Age: 82
End: 2017-08-16
Payer: MEDICARE

## 2017-08-16 VITALS
HEART RATE: 72 BPM | WEIGHT: 117 LBS | SYSTOLIC BLOOD PRESSURE: 136 MMHG | OXYGEN SATURATION: 96 % | HEIGHT: 63 IN | BODY MASS INDEX: 20.73 KG/M2 | TEMPERATURE: 97.5 F | DIASTOLIC BLOOD PRESSURE: 72 MMHG

## 2017-08-16 DIAGNOSIS — I10 ESSENTIAL HYPERTENSION: ICD-10-CM

## 2017-08-16 DIAGNOSIS — R41.89 COGNITIVE IMPAIRMENT: Primary | ICD-10-CM

## 2017-08-16 DIAGNOSIS — R53.81 PHYSICAL DECONDITIONING: ICD-10-CM

## 2017-08-16 PROCEDURE — 80048 BASIC METABOLIC PNL TOTAL CA: CPT | Performed by: INTERNAL MEDICINE

## 2017-08-16 PROCEDURE — 36415 COLL VENOUS BLD VENIPUNCTURE: CPT | Performed by: INTERNAL MEDICINE

## 2017-08-16 PROCEDURE — 99213 OFFICE O/P EST LOW 20 MIN: CPT | Performed by: INTERNAL MEDICINE

## 2017-08-16 NOTE — NURSING NOTE
"Chief Complaint   Patient presents with     Consult       Initial /78  Pulse 72  Temp 97.5  F (36.4  C) (Oral)  Ht 5' 3\" (1.6 m)  Wt 117 lb (53.1 kg)  SpO2 96%  Breastfeeding? No  BMI 20.73 kg/m2 Estimated body mass index is 20.73 kg/(m^2) as calculated from the following:    Height as of this encounter: 5' 3\" (1.6 m).    Weight as of this encounter: 117 lb (53.1 kg).  Medication Reconciliation: complete   Lakeisha MARTINEZ CMA      "

## 2017-08-16 NOTE — LETTER
[place] [address] [facility]  [address]  [place]  [address]  Tel: [phone]    August 21, 2017    Aura Broderick  [address] [place] [address] [address]  [facility] [address]        Dear MsElif Broderick,    Your follow-up basic metabolic panel shows stable impaired renal function, but slightly improved as compared to your labs checked 2 months ago    If you have any further questions or problems, please contact our office.      Sincerely,    Mian Ramos MD/ Lakeisha MARTINEZ CMA  Results for orders placed or performed in visit on 08/16/17   Basic metabolic panel   Result Value Ref Range    Sodium 141 133 - 144 mmol/L    Potassium 4.9 3.4 - 5.3 mmol/L    Chloride 105 94 - 109 mmol/L    Carbon Dioxide 29 20 - 32 mmol/L    Anion Gap 7 3 - 14 mmol/L    Glucose 85 70 - 99 mg/dL    Urea Nitrogen 35 (H) 7 - 30 mg/dL    Creatinine 1.21 (H) 0.52 - 1.04 mg/dL    GFR Estimate 42 (L) >60 mL/min/1.7m2    GFR Estimate If Black 50 (L) >60 mL/min/1.7m2    Calcium 9.3 8.5 - 10.1 mg/dL               Enclosure: Lab Results

## 2017-08-16 NOTE — PATIENT INSTRUCTIONS
(R41.89) Cognitive impairment  (primary encounter diagnosis)  Comment: we will plan to proceed with cognitive assessment and determine if current housing situation is a good option.   Plan:     (I10) Essential hypertension  Comment: blood pressure is well controlled usually, but elevated today and we will check blood pressure before you leave  Plan:     (R53.81) Physical deconditioning  Comment: We will continue to try to get more exercise   Plan:

## 2017-08-16 NOTE — PROGRESS NOTES
"[facility]  CLINIC PROGRESS NOTE    Subjective:   Cognitive impairment  Essential hypertension   Aura Broderick has been living with her  in an independent living.  Meals are prepared, and she has home care to help with bathing and some cleaning.  Her medications are being administered by her  through a pill box.  She has been taking her blood pressure medications and notes that her blood pressure is well controlled 110's-120's systolic at home.   She has higher blood pressure readings today.    She is having occupational therapy and speech evaluation and are requesting physician approval for home services    Past medical history, medications, allergies, social history, family history reviewed and updated in EPIC as of 8/16/2017 .    ROS  CONSTITUTIONAL: slight fatigue  SKIN: no worrisome rashes, no worrisome moles, no worrisome lesions  EYES: no acute vision problems or changes  ENT: no ear problems, no mouth problems, no throat problems  RESP: no significant cough, no shortness of breath  CV: no chest pain, no palpitations, no new or worsening peripheral edema  GI: no nausea, no vomiting, no constipation, no diarrhea  : no frequency, no dysuria, no hematuria  MS: no claudication, no myalgias, no joint aches  PSYCHIATRIC: no changes in mood or affect      Objective:  Vitals  /81  Pulse 72  Temp 97.5  F (36.4  C) (Oral)  Ht 5' 3\" (1.6 m)  Wt 117 lb (53.1 kg)  SpO2 96%  Breastfeeding? No  BMI 20.73 kg/m2  GEN: Alert Oriented x3 NAD  HEENT: Atraumatic, normocephalic, neck supple, no thyromegaly, negative cervical adenopathy  CV: RRR no murmurs or rubs  PULM: CTA no wheezes or crackles  ABD: Soft, nontender nondistended, no hepatosplenomegally  SKIN: No visible skin lesion or ulcerations  EXT: 2+ dorsal pedis pulses, no edema bilateral lower extremities  NEURO: Gait and station deferred, Memory loss   PSYCH: Mood good, affect mood congruent    No results found for this or any " previous visit (from the past 24 hour(s)).    Assessment/Plan:  Patient Instructions   (R41.52) Cognitive impairment  (primary encounter diagnosis)  Comment: we will plan to proceed with cognitive assessment and determine if current housing situation is a good option.   Plan:     (I10) Essential hypertension  Comment: blood pressure is well controlled usually, but elevated today and we will check blood pressure before you leave  Plan:     (R53.81) Physical deconditioning  Comment: We will continue to try to get more exercise   Plan:        Follow up in 3 months    Disclaimer: This note consists of symbols derived from keyboarding, dictation and/or voice recognition software. As a result, there may be errors in the script that have gone undetected. Please consider this when interpreting information found in this chart.    Mian Ramos MD  [phone]

## 2017-08-16 NOTE — MR AVS SNAPSHOT
After Visit Summary   8/16/2017    Aura Broderick    MRN: [id number]           Patient Information     Date Of Birth          9/25/1926        Visit Information        Provider Department      8/16/2017 12:30 PM Mian Ramos MD [facility]        Today's Diagnoses     Cognitive impairment    -  1    Essential hypertension        Physical deconditioning          Care Instructions    (R41.89) Cognitive impairment  (primary encounter diagnosis)  Comment: we will plan to proceed with cognitive assessment and determine if current housing situation is a good option.   Plan:     (I10) Essential hypertension  Comment: blood pressure is well controlled usually, but elevated today and we will check blood pressure before you leave  Plan:     (R53.81) Physical deconditioning  Comment: We will continue to try to get more exercise   Plan:              Follow-ups after your visit        Who to contact     If you have questions or need follow up information about today's clinic visit or your schedule please contact [facility] directly at [phone].  Normal or non-critical lab and imaging results will be communicated to you by MyChart, letter or phone within 4 business days after the clinic has received the results. If you do not hear from us within 7 days, please contact the clinic through [employer] or phone. If you have a critical or abnormal lab result, we will notify you by phone as soon as possible.  Submit refill requests through [employer] or call your pharmacy and they will forward the refill request to us. Please allow 3 business days for your refill to be completed.          Additional Information About Your Visit        Care EveryWhere ID     This is your Care EveryWhere ID. This could be used by other organizations to access your [place] medical records  [id number]        Your Vitals Were     Pulse Temperature Height Pulse Oximetry Breastfeeding? BMI (Body Mass Index)  "   72 97.5  F (36.4  C) (Oral) 5' 3\" (1.6 m) 96% No 20.73 kg/m2       Blood Pressure from Last 3 Encounters:   08/16/17 167/81   06/16/17 118/62   06/01/17 168/70    Weight from Last 3 Encounters:   08/16/17 117 lb (53.1 kg)   06/16/17 120 lb 6.4 oz (54.6 kg)   06/01/17 117 lb 11.2 oz (53.4 kg)              We Performed the Following     Basic metabolic panel          Today's Medication Changes          These changes are accurate as of: 8/16/17 12:57 PM.  If you have any questions, ask your nurse or doctor.               These medicines have changed or have updated prescriptions.        Dose/Directions    lactobacillus rhamnosus (GG) capsule   This may have changed:  when to take this   Used for:  Diarrhea        Dose:  1 capsule   Take 1 capsule by mouth 3 times daily (before meals)   Quantity:  50 capsule   Refills:  1                Primary Care Provider Office Phone # Fax #    Mian Ramos [phone] [address] [address]        Equal Access to Services     [facility] AH: Hadii shakir Palumbo, wavernellda maggy, qaybta kaalmada liliana, saroj young . So [facility] [phone].    ATENCIÓN: Si habla español, tiene a keita disposición servicios gratuitos de asistencia lingüística. Llame al [phone].    We comply with applicable federal civil rights laws and [place] laws. We do not discriminate on the basis of race, color, national origin, age, disability sex, sexual orientation or gender identity.            Thank you!     Thank you for choosing [facility]  for your care. Our goal is always to provide you with excellent care. Hearing back from our patients is one way we can continue to improve our services. Please take a few minutes to complete the written survey that you may receive in the mail after your visit with us. Thank you!             Your Updated Medication List - Protect others around you: Learn how to " safely use, store and throw away your medicines at www.disposemymeds.org.          This list is accurate as of: 8/16/17 12:57 PM.  Always use your most recent med list.                   Brand Name Dispense Instructions for use Diagnosis    amLODIPine 5 MG tablet    NORVASC    90 tablet    Take 1 tablet (5 mg) by mouth daily    Essential hypertension       aspirin 81 MG EC tablet     50 tablet    Take 1 tablet (81 mg) by mouth daily    Closed fracture of neck of left femur, initial encounter (H)       lactobacillus rhamnosus (GG) capsule     50 capsule    Take 1 capsule by mouth 3 times daily (before meals)    Diarrhea       * lisinopril 5 MG tablet    PRINIVIL/ZESTRIL    90 tablet    Take 1 tablet (5 mg) by mouth daily In addition to 10mg tablet to total 15mg    Essential hypertension       * lisinopril 10 MG tablet    PRINIVIL/ZESTRIL    90 tablet    Take 1 tablet (10 mg) by mouth daily    Essential hypertension       multivitamin, therapeutic Tabs tablet      Take 1 tablet by mouth 2 times daily        senna-docusate 8.6-50 MG per tablet    SENOKOT-S;PERICOLACE     Take 1 tablet by mouth At Bedtime        simvastatin 40 MG tablet    ZOCOR    90 tablet    TAKE 1 TABLET(40 MG) BY MOUTH AT BEDTIME    Hyperlipidemia LDL goal <70       VITAMIN D (CHOLECALCIFEROL) PO      Take 2,000 Units by mouth daily        * Notice:  This list has 2 medication(s) that are the same as other medications prescribed for you. Read the directions carefully, and ask your doctor or other care provider to review them with you.

## 2017-08-17 LAB
ANION GAP SERPL CALCULATED.3IONS-SCNC: 7 MMOL/L (ref 3–14)
BUN SERPL-MCNC: 35 MG/DL (ref 7–30)
CALCIUM SERPL-MCNC: 9.3 MG/DL (ref 8.5–10.1)
CHLORIDE SERPL-SCNC: 105 MMOL/L (ref 94–109)
CO2 SERPL-SCNC: 29 MMOL/L (ref 20–32)
CREAT SERPL-MCNC: 1.21 MG/DL (ref 0.52–1.04)
GFR SERPL CREATININE-BSD FRML MDRD: 42 ML/MIN/1.7M2
GLUCOSE SERPL-MCNC: 85 MG/DL (ref 70–99)
POTASSIUM SERPL-SCNC: 4.9 MMOL/L (ref 3.4–5.3)
SODIUM SERPL-SCNC: 141 MMOL/L (ref 133–144)

## 2018-01-01 ENCOUNTER — TELEPHONE (OUTPATIENT)
Dept: FAMILY MEDICINE | Facility: CLINIC | Age: 83
End: 2018-01-01

## 2018-01-01 ENCOUNTER — APPOINTMENT (OUTPATIENT)
Dept: CT IMAGING | Facility: CLINIC | Age: 83
End: 2018-01-01
Attending: EMERGENCY MEDICINE
Payer: MEDICARE

## 2018-01-01 ENCOUNTER — ASSISTED LIVING VISIT (OUTPATIENT)
Dept: GERIATRICS | Facility: CLINIC | Age: 83
End: 2018-01-01
Payer: MEDICARE

## 2018-01-01 ENCOUNTER — MEDICAL CORRESPONDENCE (OUTPATIENT)
Dept: HEALTH INFORMATION MANAGEMENT | Facility: CLINIC | Age: 83
End: 2018-01-01

## 2018-01-01 ENCOUNTER — APPOINTMENT (OUTPATIENT)
Dept: GENERAL RADIOLOGY | Facility: CLINIC | Age: 83
End: 2018-01-01
Attending: EMERGENCY MEDICINE
Payer: MEDICARE

## 2018-01-01 ENCOUNTER — TRANSFERRED RECORDS (OUTPATIENT)
Dept: HEALTH INFORMATION MANAGEMENT | Facility: CLINIC | Age: 83
End: 2018-01-01

## 2018-01-01 ENCOUNTER — HOSPITAL LABORATORY (OUTPATIENT)
Dept: OTHER | Facility: CLINIC | Age: 83
End: 2018-01-01

## 2018-01-01 ENCOUNTER — HOSPITAL ENCOUNTER (EMERGENCY)
Facility: CLINIC | Age: 83
Discharge: HOME | End: 2018-06-15
Attending: EMERGENCY MEDICINE | Admitting: EMERGENCY MEDICINE
Payer: MEDICARE

## 2018-01-01 ENCOUNTER — APPOINTMENT (OUTPATIENT)
Dept: ULTRASOUND IMAGING | Facility: CLINIC | Age: 83
End: 2018-01-01
Attending: EMERGENCY MEDICINE
Payer: MEDICARE

## 2018-01-01 ENCOUNTER — HOSPITAL ENCOUNTER (EMERGENCY)
Facility: CLINIC | Age: 83
Discharge: HOME | End: 2018-05-29
Attending: EMERGENCY MEDICINE | Admitting: EMERGENCY MEDICINE
Payer: MEDICARE

## 2018-01-01 ENCOUNTER — DOCUMENTATION ONLY (OUTPATIENT)
Dept: GERIATRICS | Facility: CLINIC | Age: 83
End: 2018-01-01
Payer: MEDICARE

## 2018-01-01 ENCOUNTER — HEALTH MAINTENANCE LETTER (OUTPATIENT)
Age: 83
End: 2018-01-01

## 2018-01-01 ENCOUNTER — OFFICE VISIT (OUTPATIENT)
Dept: FAMILY MEDICINE | Facility: CLINIC | Age: 83
End: 2018-01-01
Payer: MEDICARE

## 2018-01-01 VITALS
HEIGHT: 64 IN | BODY MASS INDEX: 20.14 KG/M2 | DIASTOLIC BLOOD PRESSURE: 91 MMHG | SYSTOLIC BLOOD PRESSURE: 189 MMHG | HEART RATE: 82 BPM | WEIGHT: 118 LBS | OXYGEN SATURATION: 96 % | TEMPERATURE: 97.8 F

## 2018-01-01 VITALS
TEMPERATURE: 98.2 F | BODY MASS INDEX: 20.14 KG/M2 | SYSTOLIC BLOOD PRESSURE: 180 MMHG | RESPIRATION RATE: 18 BRPM | WEIGHT: 118 LBS | OXYGEN SATURATION: 98 % | DIASTOLIC BLOOD PRESSURE: 68 MMHG | HEIGHT: 64 IN

## 2018-01-01 VITALS
DIASTOLIC BLOOD PRESSURE: 88 MMHG | RESPIRATION RATE: 16 BRPM | WEIGHT: 118 LBS | OXYGEN SATURATION: 95 % | SYSTOLIC BLOOD PRESSURE: 167 MMHG | BODY MASS INDEX: 20.14 KG/M2 | TEMPERATURE: 97.6 F | HEART RATE: 77 BPM | HEIGHT: 64 IN

## 2018-01-01 VITALS
SYSTOLIC BLOOD PRESSURE: 112 MMHG | DIASTOLIC BLOOD PRESSURE: 63 MMHG | HEART RATE: 80 BPM | OXYGEN SATURATION: 96 % | TEMPERATURE: 99.2 F

## 2018-01-01 VITALS
RESPIRATION RATE: 16 BRPM | HEART RATE: 79 BPM | SYSTOLIC BLOOD PRESSURE: 140 MMHG | OXYGEN SATURATION: 99 % | TEMPERATURE: 99.1 F | DIASTOLIC BLOOD PRESSURE: 80 MMHG

## 2018-01-01 DIAGNOSIS — N39.0 URINARY TRACT INFECTION WITHOUT HEMATURIA, SITE UNSPECIFIED: Primary | ICD-10-CM

## 2018-01-01 DIAGNOSIS — T36.95XA ANTIBIOTIC-ASSOCIATED DIARRHEA: Primary | ICD-10-CM

## 2018-01-01 DIAGNOSIS — K59.09 OTHER CONSTIPATION: ICD-10-CM

## 2018-01-01 DIAGNOSIS — I12.9 BENIGN HYPERTENSION WITH CKD (CHRONIC KIDNEY DISEASE) STAGE III (H): ICD-10-CM

## 2018-01-01 DIAGNOSIS — F02.80 LATE ONSET ALZHEIMER'S DISEASE WITHOUT BEHAVIORAL DISTURBANCE (H): ICD-10-CM

## 2018-01-01 DIAGNOSIS — I12.9 BENIGN HYPERTENSION WITH CHRONIC KIDNEY DISEASE, STAGE III (H): Primary | ICD-10-CM

## 2018-01-01 DIAGNOSIS — N32.89 BLADDER SPASMS: ICD-10-CM

## 2018-01-01 DIAGNOSIS — Z87.440 PERSONAL HISTORY OF URINARY TRACT INFECTION: ICD-10-CM

## 2018-01-01 DIAGNOSIS — R10.84 ABDOMINAL PAIN, GENERALIZED: ICD-10-CM

## 2018-01-01 DIAGNOSIS — N18.30 BENIGN HYPERTENSION WITH CKD (CHRONIC KIDNEY DISEASE) STAGE III (H): ICD-10-CM

## 2018-01-01 DIAGNOSIS — G30.1 LATE ONSET ALZHEIMER'S DISEASE WITHOUT BEHAVIORAL DISTURBANCE (H): ICD-10-CM

## 2018-01-01 DIAGNOSIS — R30.0 DYSURIA: ICD-10-CM

## 2018-01-01 DIAGNOSIS — I25.10 CORONARY ARTERY DISEASE INVOLVING NATIVE HEART WITHOUT ANGINA PECTORIS, UNSPECIFIED VESSEL OR LESION TYPE: ICD-10-CM

## 2018-01-01 DIAGNOSIS — E78.5 HYPERLIPIDEMIA LDL GOAL <70: ICD-10-CM

## 2018-01-01 DIAGNOSIS — M81.0 SENILE OSTEOPOROSIS: ICD-10-CM

## 2018-01-01 DIAGNOSIS — K59.00 CONSTIPATION, UNSPECIFIED CONSTIPATION TYPE: Primary | ICD-10-CM

## 2018-01-01 DIAGNOSIS — Z87.440 PERSONAL HISTORY OF URINARY TRACT INFECTION: Primary | ICD-10-CM

## 2018-01-01 DIAGNOSIS — R41.89 COGNITIVE IMPAIRMENT: ICD-10-CM

## 2018-01-01 DIAGNOSIS — N30.00 ACUTE CYSTITIS WITHOUT HEMATURIA: ICD-10-CM

## 2018-01-01 DIAGNOSIS — R82.90 NONSPECIFIC FINDING ON EXAMINATION OF URINE: ICD-10-CM

## 2018-01-01 DIAGNOSIS — K52.1 ANTIBIOTIC-ASSOCIATED DIARRHEA: Primary | ICD-10-CM

## 2018-01-01 DIAGNOSIS — I10 ESSENTIAL HYPERTENSION: ICD-10-CM

## 2018-01-01 DIAGNOSIS — R35.0 URINARY FREQUENCY: ICD-10-CM

## 2018-01-01 DIAGNOSIS — N18.30 BENIGN HYPERTENSION WITH CHRONIC KIDNEY DISEASE, STAGE III (H): Primary | ICD-10-CM

## 2018-01-01 DIAGNOSIS — K59.09 OTHER CONSTIPATION: Primary | ICD-10-CM

## 2018-01-01 DIAGNOSIS — Z71.89 ACP (ADVANCE CARE PLANNING): Chronic | ICD-10-CM

## 2018-01-01 DIAGNOSIS — R19.8 ALTERNATING CONSTIPATION AND DIARRHEA: ICD-10-CM

## 2018-01-01 LAB
ALBUMIN SERPL-MCNC: 3.4 G/DL (ref 3.4–5)
ALBUMIN SERPL-MCNC: 3.5 G/DL (ref 3.4–5)
ALBUMIN UR-MCNC: 100 MG/DL
ALBUMIN UR-MCNC: NEGATIVE MG/DL
ALP SERPL-CCNC: 75 U/L (ref 40–150)
ALP SERPL-CCNC: 81 U/L (ref 40–150)
ALT SERPL W P-5'-P-CCNC: 15 U/L (ref 0–50)
ALT SERPL W P-5'-P-CCNC: 15 U/L (ref 0–50)
AMORPH CRY #/AREA URNS HPF: ABNORMAL /HPF
ANION GAP SERPL CALCULATED.3IONS-SCNC: 10 MMOL/L (ref 3–14)
ANION GAP SERPL CALCULATED.3IONS-SCNC: 6 MMOL/L (ref 3–14)
ANION GAP SERPL CALCULATED.3IONS-SCNC: 8 MMOL/L (ref 3–14)
APPEARANCE UR: CLEAR
APPEARANCE UR: CLEAR
AST SERPL W P-5'-P-CCNC: 16 U/L (ref 0–45)
AST SERPL W P-5'-P-CCNC: 17 U/L (ref 0–45)
BACTERIA #/AREA URNS HPF: ABNORMAL /HPF
BACTERIA #/AREA URNS HPF: ABNORMAL /HPF
BACTERIA SPEC CULT: ABNORMAL
BACTERIA SPEC CULT: ABNORMAL
BASOPHILS # BLD AUTO: 0 10E9/L (ref 0–0.2)
BASOPHILS # BLD AUTO: 0 10E9/L (ref 0–0.2)
BASOPHILS NFR BLD AUTO: 0.3 %
BASOPHILS NFR BLD AUTO: 0.8 %
BILIRUB SERPL-MCNC: 0.5 MG/DL (ref 0.2–1.3)
BILIRUB SERPL-MCNC: 0.5 MG/DL (ref 0.2–1.3)
BILIRUB UR QL STRIP: NEGATIVE
BILIRUB UR QL STRIP: NEGATIVE
BUN SERPL-MCNC: 27 MG/DL (ref 7–30)
BUN SERPL-MCNC: 29 MG/DL (ref 7–30)
BUN SERPL-MCNC: 33 MG/DL (ref 7–30)
CALCIUM SERPL-MCNC: 9 MG/DL (ref 8.5–10.1)
CALCIUM SERPL-MCNC: 9 MG/DL (ref 8.5–10.1)
CALCIUM SERPL-MCNC: 9.2 MG/DL (ref 8.5–10.1)
CHLORIDE SERPL-SCNC: 105 MMOL/L (ref 94–109)
CHLORIDE SERPL-SCNC: 106 MMOL/L (ref 94–109)
CHLORIDE SERPL-SCNC: 107 MMOL/L (ref 94–109)
CO2 SERPL-SCNC: 26 MMOL/L (ref 20–32)
CO2 SERPL-SCNC: 27 MMOL/L (ref 20–32)
CO2 SERPL-SCNC: 29 MMOL/L (ref 20–32)
COLOR UR AUTO: ABNORMAL
COLOR UR AUTO: YELLOW
CREAT SERPL-MCNC: 1.15 MG/DL (ref 0.52–1.04)
CREAT SERPL-MCNC: 1.15 MG/DL (ref 0.52–1.04)
CREAT SERPL-MCNC: 1.3 MG/DL (ref 0.52–1.04)
DIFFERENTIAL METHOD BLD: NORMAL
DIFFERENTIAL METHOD BLD: NORMAL
EOSINOPHIL # BLD AUTO: 0.3 10E9/L (ref 0–0.7)
EOSINOPHIL # BLD AUTO: 0.3 10E9/L (ref 0–0.7)
EOSINOPHIL NFR BLD AUTO: 3.6 %
EOSINOPHIL NFR BLD AUTO: 6.4 %
ERYTHROCYTE [DISTWIDTH] IN BLOOD BY AUTOMATED COUNT: 13.3 % (ref 10–15)
ERYTHROCYTE [DISTWIDTH] IN BLOOD BY AUTOMATED COUNT: 13.5 % (ref 10–15)
GFR SERPL CREATININE-BSD FRML MDRD: 38 ML/MIN/1.7M2
GFR SERPL CREATININE-BSD FRML MDRD: 44 ML/MIN/1.7M2
GFR SERPL CREATININE-BSD FRML MDRD: 44 ML/MIN/1.7M2
GLUCOSE SERPL-MCNC: 102 MG/DL (ref 70–99)
GLUCOSE SERPL-MCNC: 79 MG/DL (ref 70–99)
GLUCOSE SERPL-MCNC: 84 MG/DL (ref 70–99)
GLUCOSE UR STRIP-MCNC: NEGATIVE MG/DL
GLUCOSE UR STRIP-MCNC: NEGATIVE MG/DL
HCT VFR BLD AUTO: 39.6 % (ref 35–47)
HCT VFR BLD AUTO: 42.4 % (ref 35–47)
HGB BLD-MCNC: 13 G/DL (ref 11.7–15.7)
HGB BLD-MCNC: 13.8 G/DL (ref 11.7–15.7)
HGB UR QL STRIP: ABNORMAL
HGB UR QL STRIP: NEGATIVE
IMM GRANULOCYTES # BLD: 0 10E9/L (ref 0–0.4)
IMM GRANULOCYTES # BLD: 0 10E9/L (ref 0–0.4)
IMM GRANULOCYTES NFR BLD: 0.2 %
IMM GRANULOCYTES NFR BLD: 0.3 %
INTERPRETATION ECG - MUSE: NORMAL
KETONES UR STRIP-MCNC: NEGATIVE MG/DL
KETONES UR STRIP-MCNC: NEGATIVE MG/DL
LACTATE BLD-SCNC: 0.6 MMOL/L (ref 0.7–2)
LACTATE BLD-SCNC: 1 MMOL/L (ref 0.7–2)
LEUKOCYTE ESTERASE UR QL STRIP: ABNORMAL
LEUKOCYTE ESTERASE UR QL STRIP: ABNORMAL
LYMPHOCYTES # BLD AUTO: 1.5 10E9/L (ref 0.8–5.3)
LYMPHOCYTES # BLD AUTO: 1.6 10E9/L (ref 0.8–5.3)
LYMPHOCYTES NFR BLD AUTO: 18.5 %
LYMPHOCYTES NFR BLD AUTO: 30.9 %
Lab: ABNORMAL
MCH RBC QN AUTO: 30 PG (ref 26.5–33)
MCH RBC QN AUTO: 30.1 PG (ref 26.5–33)
MCHC RBC AUTO-ENTMCNC: 32.5 G/DL (ref 31.5–36.5)
MCHC RBC AUTO-ENTMCNC: 32.8 G/DL (ref 31.5–36.5)
MCV RBC AUTO: 92 FL (ref 78–100)
MCV RBC AUTO: 92 FL (ref 78–100)
MONOCYTES # BLD AUTO: 0.4 10E9/L (ref 0–1.3)
MONOCYTES # BLD AUTO: 0.6 10E9/L (ref 0–1.3)
MONOCYTES NFR BLD AUTO: 7.2 %
MONOCYTES NFR BLD AUTO: 8.1 %
NEUTROPHILS # BLD AUTO: 2.8 10E9/L (ref 1.6–8.3)
NEUTROPHILS # BLD AUTO: 5.6 10E9/L (ref 1.6–8.3)
NEUTROPHILS NFR BLD AUTO: 53.6 %
NEUTROPHILS NFR BLD AUTO: 70.1 %
NITRATE UR QL: NEGATIVE
NITRATE UR QL: POSITIVE
NRBC # BLD AUTO: 0 10*3/UL
NRBC # BLD AUTO: 0 10*3/UL
NRBC BLD AUTO-RTO: 0 /100
NRBC BLD AUTO-RTO: 0 /100
PH UR STRIP: 6.5 PH (ref 5–7)
PH UR STRIP: 7.5 PH (ref 5–7)
PLATELET # BLD AUTO: 239 10E9/L (ref 150–450)
PLATELET # BLD AUTO: 251 10E9/L (ref 150–450)
POTASSIUM SERPL-SCNC: 3.9 MMOL/L (ref 3.4–5.3)
POTASSIUM SERPL-SCNC: 4 MMOL/L (ref 3.4–5.3)
POTASSIUM SERPL-SCNC: 4.3 MMOL/L (ref 3.4–5.3)
PROT SERPL-MCNC: 6.5 G/DL (ref 6.8–8.8)
PROT SERPL-MCNC: 7.5 G/DL (ref 6.8–8.8)
RBC # BLD AUTO: 4.32 10E12/L (ref 3.8–5.2)
RBC # BLD AUTO: 4.6 10E12/L (ref 3.8–5.2)
RBC #/AREA URNS AUTO: 1 /HPF (ref 0–2)
RBC #/AREA URNS AUTO: ABNORMAL /HPF
SODIUM SERPL-SCNC: 139 MMOL/L (ref 133–144)
SODIUM SERPL-SCNC: 142 MMOL/L (ref 133–144)
SODIUM SERPL-SCNC: 143 MMOL/L (ref 133–144)
SOURCE: ABNORMAL
SOURCE: ABNORMAL
SP GR UR STRIP: 1.01 (ref 1–1.03)
SP GR UR STRIP: 1.02 (ref 1–1.03)
SPECIMEN SOURCE: ABNORMAL
SPECIMEN SOURCE: ABNORMAL
UROBILINOGEN UR STRIP-ACNC: 0.2 EU/DL (ref 0.2–1)
UROBILINOGEN UR STRIP-MCNC: NORMAL MG/DL (ref 0–2)
WBC # BLD AUTO: 5.3 10E9/L (ref 4–11)
WBC # BLD AUTO: 8 10E9/L (ref 4–11)
WBC #/AREA URNS AUTO: 30 /HPF (ref 0–5)
WBC #/AREA URNS AUTO: >100 /HPF
WBC CLUMPS #/AREA URNS HPF: PRESENT /HPF

## 2018-01-01 PROCEDURE — 99285 EMERGENCY DEPT VISIT HI MDM: CPT | Mod: 25

## 2018-01-01 PROCEDURE — 81001 URINALYSIS AUTO W/SCOPE: CPT | Performed by: INTERNAL MEDICINE

## 2018-01-01 PROCEDURE — 93005 ELECTROCARDIOGRAM TRACING: CPT

## 2018-01-01 PROCEDURE — 85025 COMPLETE CBC W/AUTO DIFF WBC: CPT | Performed by: EMERGENCY MEDICINE

## 2018-01-01 PROCEDURE — 83605 ASSAY OF LACTIC ACID: CPT | Performed by: EMERGENCY MEDICINE

## 2018-01-01 PROCEDURE — 81001 URINALYSIS AUTO W/SCOPE: CPT | Performed by: EMERGENCY MEDICINE

## 2018-01-01 PROCEDURE — 25000128 H RX IP 250 OP 636: Performed by: EMERGENCY MEDICINE

## 2018-01-01 PROCEDURE — 80053 COMPREHEN METABOLIC PANEL: CPT | Performed by: EMERGENCY MEDICINE

## 2018-01-01 PROCEDURE — 99213 OFFICE O/P EST LOW 20 MIN: CPT | Performed by: INTERNAL MEDICINE

## 2018-01-01 PROCEDURE — 87186 SC STD MICRODIL/AGAR DIL: CPT | Performed by: EMERGENCY MEDICINE

## 2018-01-01 PROCEDURE — 74176 CT ABD & PELVIS W/O CONTRAST: CPT

## 2018-01-01 PROCEDURE — 87086 URINE CULTURE/COLONY COUNT: CPT | Performed by: EMERGENCY MEDICINE

## 2018-01-01 PROCEDURE — 76770 US EXAM ABDO BACK WALL COMP: CPT

## 2018-01-01 PROCEDURE — 87088 URINE BACTERIA CULTURE: CPT | Performed by: INTERNAL MEDICINE

## 2018-01-01 PROCEDURE — 71046 X-RAY EXAM CHEST 2 VIEWS: CPT

## 2018-01-01 PROCEDURE — 96374 THER/PROPH/DIAG INJ IV PUSH: CPT

## 2018-01-01 PROCEDURE — 87086 URINE CULTURE/COLONY COUNT: CPT | Performed by: INTERNAL MEDICINE

## 2018-01-01 PROCEDURE — 87088 URINE BACTERIA CULTURE: CPT | Performed by: EMERGENCY MEDICINE

## 2018-01-01 PROCEDURE — 87186 SC STD MICRODIL/AGAR DIL: CPT | Performed by: INTERNAL MEDICINE

## 2018-01-01 RX ORDER — CIPROFLOXACIN 500 MG/1
500 TABLET, FILM COATED ORAL 2 TIMES DAILY
Qty: 14 TABLET | Refills: 1 | Status: SHIPPED | OUTPATIENT
Start: 2018-01-01 | End: 2019-01-01

## 2018-01-01 RX ORDER — POLYETHYLENE GLYCOL 3350 17 G/17G
17 POWDER, FOR SOLUTION ORAL DAILY PRN
COMMUNITY
End: 2019-01-01

## 2018-01-01 RX ORDER — CEPHALEXIN 500 MG/1
500 CAPSULE ORAL 2 TIMES DAILY
Qty: 20 CAPSULE | Refills: 0 | Status: SHIPPED | OUTPATIENT
Start: 2018-01-01 | End: 2019-01-01

## 2018-01-01 RX ORDER — LISINOPRIL 10 MG/1
TABLET ORAL
Qty: 30 TABLET | Refills: 11 | Status: SHIPPED | OUTPATIENT
Start: 2018-01-01 | End: 2019-01-01

## 2018-01-01 RX ORDER — CEFTRIAXONE 1 G/1
1 INJECTION, POWDER, FOR SOLUTION INTRAMUSCULAR; INTRAVENOUS ONCE
Status: COMPLETED | OUTPATIENT
Start: 2018-01-01 | End: 2018-01-01

## 2018-01-01 RX ORDER — AMLODIPINE BESYLATE 5 MG/1
TABLET ORAL
Qty: 30 TABLET | Refills: 98 | Status: SHIPPED | OUTPATIENT
Start: 2018-01-01 | End: 2019-01-01

## 2018-01-01 RX ORDER — LOPERAMIDE HYDROCHLORIDE 2 MG/1
TABLET ORAL
Qty: 30 TABLET | Refills: 0
Start: 2018-01-01 | End: 2019-01-01

## 2018-01-01 RX ORDER — SIMVASTATIN 40 MG
TABLET ORAL
Qty: 30 TABLET | Refills: 97 | Status: SHIPPED | OUTPATIENT
Start: 2018-01-01 | End: 2019-01-01

## 2018-01-01 RX ORDER — CIPROFLOXACIN 500 MG/1
500 TABLET, FILM COATED ORAL 2 TIMES DAILY
Qty: 14 TABLET | Refills: 0 | Status: SHIPPED | OUTPATIENT
Start: 2018-01-01 | End: 2018-01-01

## 2018-01-01 RX ORDER — ATROPA BELLADONNA AND OPIUM 16.2; 3 MG/1; MG/1
30 SUPPOSITORY RECTAL EVERY 6 HOURS PRN
COMMUNITY
End: 2019-01-01

## 2018-01-01 RX ADMIN — CEFTRIAXONE 1 G: 1 INJECTION, POWDER, FOR SOLUTION INTRAMUSCULAR; INTRAVENOUS at 21:41

## 2018-01-01 ASSESSMENT — ENCOUNTER SYMPTOMS
DIARRHEA: 1
WEAKNESS: 1
CHILLS: 0
FEVER: 0
ABDOMINAL PAIN: 0
DYSURIA: 0
FREQUENCY: 1
NAUSEA: 1
DIARRHEA: 0
LIGHT-HEADEDNESS: 1
VOMITING: 0

## 2018-04-05 DIAGNOSIS — Z79.2 PROPHYLACTIC ANTIBIOTIC: Primary | ICD-10-CM

## 2018-04-05 NOTE — TELEPHONE ENCOUNTER
Amoxicillin (AMOXIL) 500 MG        Last Written Prescription Date:  N/A  Last Fill Quantity: N/A,   # refills: N/A  Last Office Visit: 08/16/17  Future Office visit:       Routing refill request to provider for review/approval because:  Drug not active on patient's medication list

## 2018-04-06 RX ORDER — AMOXICILLIN 500 MG/1
CAPSULE ORAL
Qty: 4 CAPSULE | Refills: 3 | Status: SHIPPED | OUTPATIENT
Start: 2018-04-06 | End: 2018-01-01

## 2018-05-29 NOTE — TELEPHONE ENCOUNTER
Reason for Call:  Home Health Care    Nedra with [facility] called regarding (reason for call): Patient update    Orders are needed for this patient. None   Patient update  Elevated BP and feeling faint and weak also shaky  She is unable to get out of bed she did not receive her PM meds yesterday   BP Today 176/84    Phone Number Homecare Nurse can be reached at: Nedra at [phone]    Can we leave a detailed message on this number? YES      Best Time: Any time    Call taken on 5/29/2018 at 12:05 PM by Kimmy Sandoval

## 2018-05-29 NOTE — ED AVS SNAPSHOT
"  Emergency Department    [address] [facility] [address]    Phone:  [phone]    Fax:  [phone]                                       Aura Broderick   MRN: [id number]    Department:   Emergency Department   Date of Visit:  5/29/2018           Patient Information     Date Of Birth          9/25/1926        Your diagnoses for this visit were:     Acute cystitis without hematuria        You were seen by Connor Tracey MD.      Follow-up Information     Follow up with Mian Ramos MD In 3 days.    Specialty:  Internal Medicine    Contact information:    [address] [facility] [address]  [facility] [address] [phone]          Follow up with  Emergency Department.    Specialty:  EMERGENCY MEDICINE    Why:  As needed, If symptoms worsen    Contact information:    [address] [facility] [address] [phone]        Discharge Instructions          * BLADDER INFECTION,Female (Adult)    A bladder infection (\"cystitis\" or \"UTI\") usually causes a constant urge to urinate and a burning when passing urine. Urine may be cloudy, smelly or dark. There may be pain in the lower abdomen. A bladder infection occurs when bacteria from the vaginal area enter the bladder opening (urethra). This can occur from sexual intercourse, wearing tight clothing, dehydration and other factors.  HOME CARE:  1. Drink lots of fluids (at least 6-8 glasses a day, unless you must restrict fluids for other medical reasons). This will force the medicine into your urinary system and flush the bacteria out of your body. Cranberry juice has been shown to help clear out the bacteria.  2. Avoid sexual intercourse until your symptoms are gone.  3. A bladder infection is treated with antibiotics. You may also be given Pyridium (generic = phenazopyridine) to reduce the burning sensation. This medicine will cause your urine to become a bright orange color. The orange urine may stain clothing. You may wear " a pad or panty-liner to protect clothing.  PREVENTING FUTURE INFECTIONS:  1. Always wipe from front to back after a bowel movement.  2. Keep the genital area clean and dry.  3. Drink plenty of fluids each day to avoid dehydration.  4. Urinate right after intercourse to flush out the bladder.  5. Wear cotton underwear and cotton-lined panty hose; avoid tight-fitting pants.  6. If you are on birth control pills and are having frequent bladder infections, discuss with your doctor.  FOLLOW UP: Return to this facility or see your doctor if ALL symptoms are not gone after three days of treatment.  GET PROMPT MEDICAL ATTENTION if any of the following occur:    Fever over 101 F (38.3 C)    No improvement by the third day of treatment    Increasing back or abdominal pain    Repeated vomiting; unable to keep medicine down    Weakness, dizziness or fainting    Vaginal discharge    Pain, redness or swelling in the labia (outer vaginal area)    9502-1818 [employer]. [address] [place] [address]. All rights reserved. This information is not intended as a substitute for professional medical care. Always follow your healthcare professional's instructions.  This information has been modified by your health care provider with permission from the publisher.      24 Hour Appointment Hotline       To make an appointment at any [facility], call [phone] ([phone]). If you don't have a family doctor or clinic, we will help you find one. [facility] clinics are conveniently located to serve the needs of you and your family.             Review of your medicines      START taking        Dose / Directions Last dose taken    cephALEXin 500 MG capsule   Commonly known as:  KEFLEX   Dose:  500 mg   Quantity:  20 capsule        Take 1 capsule (500 mg) by mouth 2 times daily for 10 days   Refills:  0          Our records show that you are taking the medicines listed below. If these are incorrect, please  call your family doctor or clinic.        Dose / Directions Last dose taken    amLODIPine 5 MG tablet   Commonly known as:  NORVASC   Dose:  5 mg   Quantity:  90 tablet        Take 1 tablet (5 mg) by mouth daily   Refills:  3        aspirin 81 MG EC tablet   Dose:  81 mg   Quantity:  50 tablet        Take 1 tablet (81 mg) by mouth daily   Refills:  1        Lactobacillus Rhamnosus (GG) capsule   Quantity:  90 capsule        TAKE 1 CAPSULE BY MOUTH ONCE DAILY   Refills:  3        * lisinopril 5 MG tablet   Commonly known as:  PRINIVIL/ZESTRIL   Dose:  5 mg   Quantity:  90 tablet        Take 1 tablet (5 mg) by mouth daily In addition to 10mg tablet to total 15mg   Refills:  3        * lisinopril 10 MG tablet   Commonly known as:  PRINIVIL/ZESTRIL   Dose:  10 mg   Quantity:  90 tablet        Take 1 tablet (10 mg) by mouth daily   Refills:  3        multivitamin, therapeutic Tabs tablet   Quantity:  60 tablet        TAKE 1 TABLET BY MOUTH TWICE DAILY   Refills:  11        senna-docusate 8.6-50 MG per tablet   Commonly known as:  SENOKOT-S;PERICOLACE   Dose:  1 tablet        Take 1 tablet by mouth At Bedtime   Refills:  0        simvastatin 40 MG tablet   Commonly known as:  ZOCOR   Quantity:  90 tablet        TAKE 1 TABLET(40 MG) BY MOUTH AT BEDTIME   Refills:  2        VITAMIN D (CHOLECALCIFEROL) PO   Dose:  2000 Units        Take 2,000 Units by mouth daily   Refills:  0        * Notice:  This list has 2 medication(s) that are the same as other medications prescribed for you. Read the directions carefully, and ask your doctor or other care provider to review them with you.            Prescriptions were sent or printed at these locations (1 Prescription)                   Other Prescriptions                Printed at Department/Unit printer (1 of 1)         cephALEXin (KEFLEX) 500 MG capsule                Procedures and tests performed during your visit     CBC with platelets + differential    Chest XR,  PA & LAT     Comprehensive metabolic panel    EKG 12 lead    Lactic acid    UA with Microscopic    Urine Culture      Orders Needing Specimen Collection     None      Pending Results     Date and Time Order Name Status Description    5/29/2018 1953 Urine Culture In process             Pending Culture Results     Date and Time Order Name Status Description    5/29/2018 1953 Urine Culture In process             Pending Results Instructions     If you had any lab results that were not finalized at the time of your Discharge, you can call the ED Lab Result RN at [phone]. You will be contacted by this team for any positive Lab results or changes in treatment. The nurses are available 7 days a week from 10A to 6:30P.  You can leave a message 24 hours per day and they will return your call.        Test Results From Your Hospital Stay        5/29/2018  8:32 PM      Component Results     Component Value Ref Range & Units Status    WBC 5.3 4.0 - 11.0 10e9/L Final    RBC Count 4.32 3.8 - 5.2 10e12/L Final    Hemoglobin 13.0 11.7 - 15.7 g/dL Final    Hematocrit 39.6 35.0 - 47.0 % Final    MCV 92 78 - 100 fl Final    MCH 30.1 26.5 - 33.0 pg Final    MCHC 32.8 31.5 - 36.5 g/dL Final    RDW 13.3 10.0 - 15.0 % Final    Platelet Count 239 150 - 450 10e9/L Final    Diff Method Automated Method  Final    % Neutrophils 53.6 % Final    % Lymphocytes 30.9 % Final    % Monocytes 8.1 % Final    % Eosinophils 6.4 % Final    % Basophils 0.8 % Final    % Immature Granulocytes 0.2 % Final    Nucleated RBCs 0 0 /100 Final    Absolute Neutrophil 2.8 1.6 - 8.3 10e9/L Final    Absolute Lymphocytes 1.6 0.8 - 5.3 10e9/L Final    Absolute Monocytes 0.4 0.0 - 1.3 10e9/L Final    Absolute Eosinophils 0.3 0.0 - 0.7 10e9/L Final    Absolute Basophils 0.0 0.0 - 0.2 10e9/L Final    Abs Immature Granulocytes 0.0 0 - 0.4 10e9/L Final    Absolute Nucleated RBC 0.0  Final         5/29/2018  8:54 PM      Component Results     Component Value Ref Range & Units Status     Sodium 142 133 - 144 mmol/L Final    Potassium 3.9 3.4 - 5.3 mmol/L Final    Chloride 107 94 - 109 mmol/L Final    Carbon Dioxide 29 20 - 32 mmol/L Final    Anion Gap 6 3 - 14 mmol/L Final    Glucose 102 (H) 70 - 99 mg/dL Final    Urea Nitrogen 29 7 - 30 mg/dL Final    Creatinine 1.15 (H) 0.52 - 1.04 mg/dL Final    GFR Estimate 44 (L) >60 mL/min/1.7m2 Final    Non  GFR Calc    GFR Estimate If Black 53 (L) >60 mL/min/1.7m2 Final    African American GFR Calc    Calcium 9.0 8.5 - 10.1 mg/dL Final    Bilirubin Total 0.5 0.2 - 1.3 mg/dL Final    Albumin 3.4 3.4 - 5.0 g/dL Final    Protein Total 6.5 (L) 6.8 - 8.8 g/dL Final    Alkaline Phosphatase 75 40 - 150 U/L Final    ALT 15 0 - 50 U/L Final    AST 17 0 - 45 U/L Final         5/29/2018  8:36 PM      Component Results     Component Value Ref Range & Units Status    Lactic Acid 0.6 (L) 0.7 - 2.0 mmol/L Final         5/29/2018  8:38 PM      Component Results     Component Value Ref Range & Units Status    Color Urine Straw  Final    Appearance Urine Clear  Final    Glucose Urine Negative NEG^Negative mg/dL Final    Bilirubin Urine Negative NEG^Negative Final    Ketones Urine Negative NEG^Negative mg/dL Final    Specific Gravity Urine 1.007 1.003 - 1.035 Final    Blood Urine Negative NEG^Negative Final    pH Urine 6.5 5.0 - 7.0 pH Final    Protein Albumin Urine Negative NEG^Negative mg/dL Final    Urobilinogen mg/dL Normal 0.0 - 2.0 mg/dL Final    Nitrite Urine Positive (A) NEG^Negative Final    Leukocyte Esterase Urine Large (A) NEG^Negative Final    Source Catheterized Urine  Final    WBC Urine 30 (H) 0 - 5 /HPF Final    RBC Urine 1 0 - 2 /HPF Final    WBC Clumps Present (A) NEG^Negative /HPF Final    Bacteria Urine Few (A) NEG^Negative /HPF Final    Amorphous Crystals Few (A) NEG^Negative /HPF Final         5/29/2018  8:28 PM         5/29/2018  9:02 PM      Narrative     CHEST TWO VIEWS   5/29/2018 8:38 PM     HISTORY: Fever, weakness.    COMPARISON:  2/17/2017.    FINDINGS: Mild cardiomegaly. Hyperinflation due to COPD. Calcified  hilar lymph nodes. No interval change.        Impression     IMPRESSION: COPD. Nothing acute and no interval change.    EDSON GILBERT MD                Clinical Quality Measure: Blood Pressure Screening     Your blood pressure was checked while you were in the emergency department today. The last reading we obtained was  BP: 180/68 . Please read the guidelines below about what these numbers mean and what you should do about them.  If your systolic blood pressure (the top number) is less than 120 and your diastolic blood pressure (the bottom number) is less than 80, then your blood pressure is normal. There is nothing more that you need to do about it.  If your systolic blood pressure (the top number) is 120-139 or your diastolic blood pressure (the bottom number) is 80-89, your blood pressure may be higher than it should be. You should have your blood pressure rechecked within a year by a primary care provider.  If your systolic blood pressure (the top number) is 140 or greater or your diastolic blood pressure (the bottom number) is 90 or greater, you may have high blood pressure. High blood pressure is treatable, but if left untreated over time it can put you at risk for heart attack, stroke, or kidney failure. You should have your blood pressure rechecked by a primary care provider within the next 4 weeks.  If your provider in the emergency department today gave you specific instructions to follow-up with your doctor or provider even sooner than that, you should follow that instruction and not wait for up to 4 weeks for your follow-up visit.        Thank you for choosing [facility]       Thank you for choosing [facility] for your care. Our goal is always to provide you with excellent care. Hearing back from our patients is one way we can continue to improve our services. Please take a few minutes to complete the written survey that  "you may receive in the mail after you visit with us. Thank you!        [employer] Information     [employer] lets you send messages to your doctor, view your test results, renew your prescriptions, schedule appointments and more. To sign up, go to [email or web address] . Click on \"Log in\" on the left side of the screen, which will take you to the Welcome page. Then click on \"Sign up Now\" on the right side of the page.     You will be asked to enter the access code listed below, as well as some personal information. Please follow the directions to create your username and password.     Your access code is: [email or web address]  Expires: 2018  9:55 PM     Your access code will  in 90 days. If you need help or a new code, please call your [facility] clinic or [phone].        Care EveryWhere ID     This is your Care EveryWhere ID. This could be used by other organizations to access your [facility] medical records  [id number]        Equal Access to Services     SUE GEE : Hadangella richtero Somichael, waaxda luqadaha, qaybta kaalmada liliana, saroj mendoza. So [facility] [phone].    ATENCIÓN: Si habla español, tiene a keita disposición servicios gratuitos de asistencia lingüística. Llame al [phone].    We comply with applicable federal civil rights laws and [place] laws. We do not discriminate on the basis of race, color, national origin, age, disability, sex, sexual orientation, or gender identity.            After Visit Summary       This is your record. Keep this with you and show to your community pharmacist(s) and doctor(s) at your next visit.                  "

## 2018-05-29 NOTE — TELEPHONE ENCOUNTER
Nedra calling back from [facility].  BP at 4:30 180/72 sitting.  She does state that she feels better than this morning.   Nedra faxed UA results to [phone].  UA does indicate that the WBC is high.  Pt has a slight fever of 99.2F.  RN will be on duty at [facility] until 7:30pm today.  [facility] ph# [phone]. Ok to leave a detailed message.

## 2018-05-29 NOTE — ED AVS SNAPSHOT
Emergency Department    [address] [place] [address]    Phone:  [phone]    Fax:  [phone]                                       Aura Broderick   MRN: [id number]    Department:   Emergency Department   Date of Visit:  5/29/2018           After Visit Summary Signature Page     I have received my discharge instructions, and my questions have been answered. I have discussed any challenges I see with this plan with the nurse or doctor.    ..........................................................................................................................................  Patient/Patient Representative Signature      ..........................................................................................................................................  Patient Representative Print Name and Relationship to Patient    ..................................................               ................................................  Date                                            Time    ..........................................................................................................................................  Reviewed by Signature/Title    ...................................................              ..............................................  Date                                                            Time

## 2018-05-29 NOTE — TELEPHONE ENCOUNTER
Call back to Nedra at [facility], BP has been running on high side but when you look at history patient has hx of high and low BP's frequently. states she checked BP again  and /82, HR 74 and regular slight temp @ 99.1, resp 25 O2 sat good. Patient did miss her BP meds Saturday night but had them last night.  She is C/O feeling week and tremulous. She ate a large breakfast, skipped lunch due to large breakfast, drinking fluids, urine looks normal and no C/O dysuria but does have slight temp.  Ordered Stat UA/UC to rule out UTI, patient to rest today and push fluids. No signs or symptoms of respiratory illness.    Please advise further.    Kelley Soto RN

## 2018-05-30 NOTE — DISCHARGE INSTRUCTIONS
"   * BLADDER INFECTION,Female (Adult)    A bladder infection (\"cystitis\" or \"UTI\") usually causes a constant urge to urinate and a burning when passing urine. Urine may be cloudy, smelly or dark. There may be pain in the lower abdomen. A bladder infection occurs when bacteria from the vaginal area enter the bladder opening (urethra). This can occur from sexual intercourse, wearing tight clothing, dehydration and other factors.  HOME CARE:  1. Drink lots of fluids (at least 6-8 glasses a day, unless you must restrict fluids for other medical reasons). This will force the medicine into your urinary system and flush the bacteria out of your body. Cranberry juice has been shown to help clear out the bacteria.  2. Avoid sexual intercourse until your symptoms are gone.  3. A bladder infection is treated with antibiotics. You may also be given Pyridium (generic = phenazopyridine) to reduce the burning sensation. This medicine will cause your urine to become a bright orange color. The orange urine may stain clothing. You may wear a pad or panty-liner to protect clothing.  PREVENTING FUTURE INFECTIONS:  1. Always wipe from front to back after a bowel movement.  2. Keep the genital area clean and dry.  3. Drink plenty of fluids each day to avoid dehydration.  4. Urinate right after intercourse to flush out the bladder.  5. Wear cotton underwear and cotton-lined panty hose; avoid tight-fitting pants.  6. If you are on birth control pills and are having frequent bladder infections, discuss with your doctor.  FOLLOW UP: Return to this facility or see your doctor if ALL symptoms are not gone after three days of treatment.  GET PROMPT MEDICAL ATTENTION if any of the following occur:    Fever over 101 F (38.3 C)    No improvement by the third day of treatment    Increasing back or abdominal pain    Repeated vomiting; unable to keep medicine down    Weakness, dizziness or fainting    Vaginal discharge    Pain, redness or swelling in " the labia (outer vaginal area)    4401-8640 [employer]. [address] [place] [address], [place] [address]. All rights reserved. This information is not intended as a substitute for professional medical care. Always follow your healthcare professional's instructions.  This information has been modified by your health care provider with permission from the publisher.

## 2018-05-30 NOTE — ED PROVIDER NOTES
"  History     Chief Complaint:  Altered Mental Status    The history is provided by the patient and a relative (son).      Aura Broderick is a 89 year old female with a history of frequent UTI's who presents to the emergency department with her son for evaluation of altered mental status. The patient's son is an intensive care physician. The patient lives with her  in [facility]. Her son reports that she was recently noted to be more weak, lightheaded, and \"just not feeling well\" compared to her baseline recently. This morning, her  reported to son that she could not get out of bed due to feeling so weak. The patient notes she had some nausea this morning as well, but had no vomiting. A UA and CBC was done at the care facility, [place] [address]. UA is still pending. Patient was told to present to the emergency department for further evaluation and possibly sepsis workup. Here she admits to just feeling weak and slightly increased urinary frequency, but denies any recent dysuria. She states that she typically experienced dysuria with her past UTI's. She further denies any abdominal pain or diarrhea. The patient did not eat lunch, but had a significant amount of food for breakfast and dinner today.     Allergies:  Contrast Dye    Medications:    Norvasc  Aspirin 81 mg   [facility]   Lisinopril   Thera-Vit  Senna-docusate  Zocor  Vitamin D     Past Medical History:    UTI, with Bacteremia  Dizziness and giddiness   Essential hypertension, benign   Myocardial infarction   Other and unspecified hyperlipidemia   Palpitations   Pure hypercholesterolemia     Past Surgical History:    Tonsillectomy   Dilation and curettage   Colonoscopy   Closed reduction, percutaneous pinning hip  Cardiac stenting    Family History:    Pancreatic Cancer  Mother  MI    Father  Renal Cell Cancer  Brother    Social History:  The patient was accompanied to the emergency department " "by her son, who is a physician.  Smoking Status: Former Smoker  Smokeless Tobacco: Never Used  Alcohol Use: Rarely  Marital Status:     Review of Systems   Gastrointestinal: Positive for nausea. Negative for abdominal pain, diarrhea and vomiting.   Genitourinary: Positive for frequency. Negative for dysuria.   Neurological: Positive for weakness (generalized) and light-headedness.   All other systems reviewed and are negative.    Physical Exam     Patient Vitals for the past 24 hrs:   BP Temp Temp src Heart Rate Resp SpO2 Height Weight   05/29/18 2105 180/68 - - 58 18 98 % - -   05/29/18 1932 186/69 98.2  F (36.8  C) Oral 67 18 93 % 1.626 m (5' 4\") 53.5 kg (118 lb)       Physical Exam  General: Appears well-developed and well-nourished.   Head: No signs of trauma.   Mouth/Throat: Oropharynx is clear and moist.   CV: Normal rate and regular rhythm.    Resp: Effort normal and breath sounds normal. No respiratory distress.   GI: Soft. There is no tenderness.  No rebound or guarding.  Normal bowel sounds.  No CVA tenderness.  MSK: Normal range of motion. no edema. No Calf tenderness.  Neuro: The patient is alert and oriented to person, place, and time.  Strength in upper/lower extremities normal and symmetrical.   Sensation normal. Speech normal.  Skin: Skin is warm and dry. No rash noted.   Psych: normal mood and affect. behavior is normal.       Emergency Department Course     ECG:  ECG taken at 2040, ECG read at 2055  Sinus bradycardia with 1st degree AV block  Cannot rule out Anterior infarct, age undetermined  Abnormal ECG   No significant change as compared to ECG dated 05/14/2014.   Rate 53 bpm. MO interval 248 ms. QRS duration 84 ms. QT/QTc 422/395 ms. P-R-T axes 60 48 74.    Imaging:  Radiology findings were communicated with the patient and her son who voiced understanding of the findings.    Chest XR,  PA & LAT  COPD. Nothing acute and no interval change.  Reading per radiology. "     Laboratory:  Laboratory findings were communicated with the patient and her son who voiced understanding of the findings.    UA: Nitrite Positive (A), Leukocyte Esterase Large (A), WBC/HPF 30 (H), WBC Clumps Present (A), Bacteria Few (A), Amorphous Crystals Few (A), o/w Negative   Urine Culture: Pending  CBC: WBC 5.3, HGB 13.0,   CMP: Glucose 102 (H), Creatinine 1.15 (H), GFR Estimate 44 (L), Protein Total 6.5 (L) o/w WNL   Lactic Acid (Collected 2011): 0.6 (L) Resulted 2036    Interventions:  2141 Rocephin 1 gm IV     Emergency Department Course:     Nursing notes and vitals reviewed.     I performed an exam of the patient as documented above.     IV was inserted and blood was drawn for laboratory testing, results above.     The patient provided a urine sample here in the emergency department. This was sent for laboratory testing, findings above.    The patient was sent for an x-ray while in the emergency department, results above.    2133 I personally reviewed the laboratory, imaging, and EKG results with the patient and her son and answered all related questions prior to discharge.    Impression & Plan      Medical Decision Making:  Aura Broderick is a 89 year old female who presents to the emergency department today with generalized weakness.  Today she felt somewhat more weak than typical and felt uncomfortable with getting up out of bed for fear falling.  She had no other specific complaints.  Her exam was overall benign.  Blood work was obtained that showed normal white blood cell count and normal lactic acid and appropriate electrolytes.  UA did show signs of a urinary tract infection.  Chest x-ray was negative.  Patient does not have clinical signs of sepsis at this time.  Patient's son is an intensivist and was present during her workup.  I did discuss with the patient and her son disposition options including admission versus going home.  Patient very much felt comfortable with going home as  she does have assistance if need be.  Son felt comfortable with this plan as well.  Patient was given a dose of ceftriaxone in the emergency department and discharged home with Keflex.  She is recommended to push plenty of fluids and to return to the ER if she had any further concerns and to follow-up closed with her primary care doctor.    Diagnosis:    ICD-10-CM    1. Acute cystitis without hematuria N30.00      Disposition:   The patient was discharged back to [place] with her son.     Discharge Medications:  Discharge Medication List as of 5/29/2018  9:56 PM      START taking these medications    Details   cephALEXin (KEFLEX) 500 MG capsule Take 1 capsule (500 mg) by mouth 2 times daily for 10 days, Disp-20 capsule, R-0, Local Print             Scribe Disclosure:  I, Carmel Hernandez, am serving as a scribe at 7:42 PM on 5/29/2018 to document services personally performed by Connor Tracey MD, based on my observations and the provider's statements to me.     EMERGENCY DEPARTMENT       Connor Tracey MD  05/30/18 0102

## 2018-06-12 NOTE — TELEPHONE ENCOUNTER
Dr. Ramos patient out of the office:  Cordell please sign:    Call to [facility], spoke with Nedra ALMONTE, states patient just finished her Keflex this week (6/8) for a UTI and also had Rocephin in ED 5/29 but is having multiple episodes of diarrhea today, no other symptoms, and would like an order to try Loperamide 2 mg for relief.    Verbal order given and they will fax order over for signature.  Approved Loperamide 2 mg (OTC) order for 24 hour period and told to call back to PCP if no improvement after 24 hours.    Please sign order.  Thank you,  Kelley Soto RN

## 2018-06-12 NOTE — TELEPHONE ENCOUNTER
Reason for Call:  Home Health Care    Aura Quevedo on [facility] called regarding (reason for call): BP Update     Orders are needed for this patient. None    Having Multiple episodes of Diarrhea they would like a Rx Anti - Diarrhea's     Fax # [phone]    Phone Number Homecare Nurse can be reached at: [phone]    Can we leave a detailed message on this number? YES    Best Time: anytime    Call taken on 6/12/2018 at 11:09 AM by Kimmy Sandoval

## 2018-06-12 NOTE — TELEPHONE ENCOUNTER
Left message on machine with advise as per below. Advised to call back to clinic tomorrow with update if symptoms not improved. Sample for CDiff testing will need to be obtained.     Carlene NUNES RN

## 2018-06-14 NOTE — TELEPHONE ENCOUNTER
Last OV with PCP 8/16/17   PCP is out of office currently  Is at [facility] - is currently in Memory Care  Still considers Dr. Ramos PCP, does not want to switch to NP there - they won't allow pt to have 2 PCPs   Pyridium - is requesting an Rx for this    Pt has physician family members who think it's a stone or cystitis   Finished abx after recent hospitalization for UTI    Is incontinent and having painful spasms - is requesting Rx without being seen. Advised pt should be evaluated - scheduled OV with Dr. Lei for this evening     Nolvia MARTINEZ RN

## 2018-06-14 NOTE — MR AVS SNAPSHOT
"              After Visit Summary   2018    Aura Broderick    MRN: [id number]           Patient Information     Date Of Birth          1926        Visit Information        Provider Department      2018 5:20 PM Alexa Lei MD [facility]        Today's Diagnoses     Urinary tract infection without hematuria, site unspecified    -  1    Dysuria        Nonspecific finding on examination of urine        Bladder spasms        Urinary frequency           Follow-ups after your visit        Who to contact     If you have questions or need follow up information about today's clinic visit or your schedule please contact [facility] directly at [phone].  Normal or non-critical lab and imaging results will be communicated to you by MyChart, letter or phone within 4 business days after the clinic has received the results. If you do not hear from us within 7 days, please contact the clinic through Palm[facility] or phone. If you have a critical or abnormal lab result, we will notify you by phone as soon as possible.  Submit refill requests through [employer]ight[employer] or call your pharmacy and they will forward the refill request to us. Please allow 3 business days for your refill to be completed.          Additional Information About Your Visit        MyChart Information     [employer]uelightApp lets you send messages to your doctor, view your test results, renew your prescriptions, schedule appointments and more. To sign up, go to [email or web address] . Click on \"Log in\" on the left side of the screen, which will take you to the Welcome page. Then click on \"Sign up Now\" on the right side of the page.     You will be asked to enter the access code listed below, as well as some personal information. Please follow the directions to create your username and password.     Your access code is: [email or web address]  Expires: 2018  9:55 PM     Your access code will  in 90 days. If you need help or a new code, " "please call your [facility] clinic or [phone].        Care EveryWhere ID     This is your Care EveryWhere ID. This could be used by other organizations to access your [facility] medical records  [id number]        Your Vitals Were     Pulse Temperature Height Pulse Oximetry BMI (Body Mass Index)       82 97.8  F (36.6  C) (Tympanic) 5' 4\" (1.626 m) 96% 20.25 kg/m2        Blood Pressure from Last 3 Encounters:   06/14/18 (!) 189/91   05/29/18 180/68   08/16/17 136/72    Weight from Last 3 Encounters:   06/14/18 118 lb (53.5 kg)   05/29/18 118 lb (53.5 kg)   08/16/17 117 lb (53.1 kg)              We Performed the Following     UA with Microscopic reflex to Culture     Urine Culture Aerobic Bacterial          Today's Medication Changes          These changes are accurate as of 6/14/18  6:45 PM.  If you have any questions, ask your nurse or doctor.               Start taking these medicines.        Dose/Directions    ciprofloxacin 500 MG tablet   Commonly known as:  CIPRO   Used for:  Dysuria, Urinary tract infection without hematuria, site unspecified   Started by:  Alexa Lei MD        Dose:  500 mg   Take 1 tablet (500 mg) by mouth 2 times daily for 7 days   Quantity:  14 tablet   Refills:  1       phenazopyridine 97.5 MG tablet   Commonly known as:  AZO   Used for:  Dysuria, Urinary tract infection without hematuria, site unspecified   Started by:  Alexa Lei MD        Dose:  97.5 mg   Take 1 tablet (97.5 mg) by mouth 2 times daily as needed for urinary tract discomfort   Quantity:  6 tablet   Refills:  1            Where to get your medicines      These medications were sent to [id number]  [place], [place] - [address] AT [address] [place] [address] & [facility]  [address] [place] [address]    Hours:  24-hours Phone:  [phone]     phenazopyridine 97.5 MG tablet         Some of these will need a paper prescription and others can be bought over the counter.  Ask your nurse if you have " questions.     Bring a paper prescription for each of these medications     ciprofloxacin 500 MG tablet                Primary Care Provider Office Phone # Fax #    Mian Ramos [phone] [address] [address]        Equal Access to Services     SUE GEE : Hadii aad ku hadasho Soomaali, waaxda luqadaha, qaybta kaalmada adeegyada, waxay mary carmenin haylogann billy fisherrazgrace young . So [facility] [phone].    ATENCIÓN: Si habla español, tiene a keita disposición servicios gratuitos de asistencia lingüística. Llame al [phone].    We comply with applicable federal civil rights laws and [place] laws. We do not discriminate on the basis of race, color, national origin, age, disability, sex, sexual orientation, or gender identity.            Thank you!     Thank you for choosing [facility]  for your care. Our goal is always to provide you with excellent care. Hearing back from our patients is one way we can continue to improve our services. Please take a few minutes to complete the written survey that you may receive in the mail after your visit with us. Thank you!             Your Updated Medication List - Protect others around you: Learn how to safely use, store and throw away your medicines at www.disposemymeds.org.          This list is accurate as of 6/14/18  6:45 PM.  Always use your most recent med list.                   Brand Name Dispense Instructions for use Diagnosis    amLODIPine 5 MG tablet    NORVASC    90 tablet    Take 1 tablet (5 mg) by mouth daily    Essential hypertension       aspirin 81 MG EC tablet     50 tablet    Take 1 tablet (81 mg) by mouth daily    Closed fracture of neck of left femur, initial encounter (H)       ciprofloxacin 500 MG tablet    CIPRO    14 tablet    Take 1 tablet (500 mg) by mouth 2 times daily for 7 days    Dysuria, Urinary tract infection without hematuria, site unspecified       Lactobacillus Rhamnosus (GG)  capsule     90 capsule    TAKE 1 CAPSULE BY MOUTH ONCE DAILY    Loose stools       * lisinopril 5 MG tablet    PRINIVIL/ZESTRIL    90 tablet    Take 1 tablet (5 mg) by mouth daily In addition to 10mg tablet to total 15mg    Essential hypertension       * lisinopril 10 MG tablet    PRINIVIL/ZESTRIL    90 tablet    Take 1 tablet (10 mg) by mouth daily    Essential hypertension       loperamide 2 MG tablet    IMODIUM A-D    30 tablet    Take 2 tabs (4 mg) after first loose stool, and then take one tab (2 mg) after each diarrheal stool.  Max of 8 tabs (16 mg) per day.    Antibiotic-associated diarrhea       multivitamin, therapeutic Tabs tablet     60 tablet    TAKE 1 TABLET BY MOUTH TWICE DAILY    Vitamin deficiency       phenazopyridine 97.5 MG tablet    AZO    6 tablet    Take 1 tablet (97.5 mg) by mouth 2 times daily as needed for urinary tract discomfort    Dysuria, Urinary tract infection without hematuria, site unspecified       senna-docusate 8.6-50 MG per tablet    SENOKOT-S;PERICOLACE     Take 1 tablet by mouth At Bedtime        simvastatin 40 MG tablet    ZOCOR    90 tablet    TAKE 1 TABLET(40 MG) BY MOUTH AT BEDTIME    Hyperlipidemia LDL goal <70       VITAMIN D (CHOLECALCIFEROL) PO      Take 2,000 Units by mouth daily        * Notice:  This list has 2 medication(s) that are the same as other medications prescribed for you. Read the directions carefully, and ask your doctor or other care provider to review them with you.

## 2018-06-14 NOTE — TELEPHONE ENCOUNTER
Reason for call:  Patient reporting a symptom    Symptom or request: Bladder Spasm after her course of ABX for a Bladder infection   May need Peridium to control Bladder spasm      [facility] - [place], [place] - [address] [place] [address]    Duration (how long have symptoms been present): Since she completed her ABX    Have you been treated for this before? No    Additional comments: Please either send RX or call to discuss    Phone Number patient can be reached at:  Magy the daughter [phone]    Best Time:  anytime    Can we leave a detailed message on this number:  YES    Call taken on 6/14/2018 at 11:23 AM by Kimmy Sandoval

## 2018-06-14 NOTE — PROGRESS NOTES
SUBJECTIVE:   Aura Broderick is a 89 year old female who presents to clinic today for the following health issues:    Patient presents with:  UTI: recent UTI , having bladder spasms x2 days , frequent urination       HPI:   Patient Aura Broderick is a very pleasant 89 year old female with history of recent UTI who presents to Internal Medicine clinic today for evaluation of new recurrent dysuria, bladder spasms, and urinary frequency symtpoms for 2 days.     Current Medications:     Current Outpatient Prescriptions   Medication Sig Dispense Refill     amLODIPine (NORVASC) 5 MG tablet Take 1 tablet (5 mg) by mouth daily 90 tablet 3     aspirin 81 MG EC tablet Take 1 tablet (81 mg) by mouth daily 50 tablet 1     Lactobacillus Rhamnosus, GG, ([facility]) capsule TAKE 1 CAPSULE BY MOUTH ONCE DAILY 90 capsule 3     lisinopril (PRINIVIL/ZESTRIL) 10 MG tablet Take 1 tablet (10 mg) by mouth daily 90 tablet 3     lisinopril (PRINIVIL/ZESTRIL) 5 MG tablet Take 1 tablet (5 mg) by mouth daily In addition to 10mg tablet to total 15mg 90 tablet 3     loperamide (IMODIUM A-D) 2 MG tablet Take 2 tabs (4 mg) after first loose stool, and then take one tab (2 mg) after each diarrheal stool.  Max of 8 tabs (16 mg) per day. 30 tablet 0     multivitamin, therapeutic (THERA-VIT) TABS tablet TAKE 1 TABLET BY MOUTH TWICE DAILY 60 tablet 11     senna-docusate (SENOKOT-S;PERICOLACE) 8.6-50 MG per tablet Take 1 tablet by mouth At Bedtime       simvastatin (ZOCOR) 40 MG tablet TAKE 1 TABLET(40 MG) BY MOUTH AT BEDTIME 90 tablet 2     VITAMIN D, CHOLECALCIFEROL, PO Take 2,000 Units by mouth daily           Allergies:      Allergies   Allergen Reactions     Contrast Dye             Past Medical History:     Past Medical History:   Diagnosis Date     Dizziness and giddiness      Essential hypertension, benign      Myocardial infarction     LAD - stent - Katy Beckham     Other and unspecified hyperlipidemia       Palpitations      Pure hypercholesterolemia          Past Surgical History:     Past Surgical History:   Procedure Laterality Date     C NONSPECIFIC PROCEDURE      tonsillectomy     C NONSPECIFIC PROCEDURE      D&C     C NONSPECIFIC PROCEDURE  06-7-99    COLONOSCOPY      CLOSED REDUCTION, PERCUTANEOUS PINNING HIP Left 3/16/2016    Procedure: CLOSED REDUCTION, PERCUTANEOUS PINNING HIP;  Surgeon: Sukh Kebede MD;  Location:  OR     CORONARY ANGIOGRAPHY ADULT ORDER  2008-[facility]    stent         Family Medical History:     Family History   Problem Relation Age of Onset     CANCER Mother      pancreatic     HEART DISEASE Father 83     MI     CANCER Brother      renal cell         Social History:     Social History     Social History     Marital status:      Spouse name: N/A     Number of children: N/A     Years of education: N/A     Occupational History     Not on file.     Social History Main Topics     Smoking status: Former Smoker     Years: 10.00     Smokeless tobacco: Never Used     Alcohol use 0.0 oz/week     0 Standard drinks or equivalent per week      Comment: rare     Drug use: No     Sexual activity: No     Other Topics Concern     Caffeine Concern No     Sleep Concern Yes     Weight Concern No     Special Diet No     Exercise No     Seat Belt Yes     Social History Narrative    , 1 son living in [place]                Review of System:     Constitutional: Negative for fever or chills  Skin: Negative for rashes  Ears/Nose/Throat: Negative for nasal congestion, sore throat  Respiratory: No shortness of breath, dyspnea on exertion, cough, or hemoptysis  Cardiovascular: Negative for chest pain  Gastrointestinal: Negative for nausea, vomiting  Genitourinary: Positive for dysuria, bladder spasms  Musculoskeletal: Negative for myalgias  Neurologic: Negative for headaches  Psychiatric: Negative for depression, anxiety  Hematologic/Lymphatic/Immunologic: Negative  Endocrine:  "Negative  Behavioral: Negative for tobacco use       Physical Exam:   BP (!) 189/91 (BP Location: Right arm, Patient Position: Sitting, Cuff Size: Adult Small)  Pulse 82  Temp 97.8  F (36.6  C) (Tympanic)  Ht 5' 4\" (1.626 m)  Wt 118 lb (53.5 kg)  SpO2 96%  BMI 20.25 kg/m2    GENERAL: chronically ill appearing elderly female, alert and no acute distress  EYES: eyes grossly normal to inspection, and conjunctivae and sclerae normal  HENT: Normocephalic atraumatic. Nose and mouth without ulcers or lesions  NECK: supple  RESP: lungs clear to auscultation   CV: regular rate and rhythm, normal S1 S2  LYMPH: no peripheral edema   ABDOMEN: nondistended  MS: no gross musculoskeletal defects noted  SKIN: no suspicious lesions or rashes  NEURO: Alert & Oriented x 3.   PSYCH: mentation appears normal, affect normal        Diagnostic Test Results:     Diagnostic Test Results:  Results for orders placed or performed in visit on 06/14/18 (from the past 24 hour(s))   UA with Microscopic reflex to Culture   Result Value Ref Range    Color Urine Yellow     Appearance Urine Clear     Glucose Urine Negative NEG^Negative mg/dL    Bilirubin Urine Negative NEG^Negative    Ketones Urine Negative NEG^Negative mg/dL    Specific Gravity Urine 1.025 1.003 - 1.035    pH Urine 7.5 (H) 5.0 - 7.0 pH    Protein Albumin Urine 100 (A) NEG^Negative mg/dL    Urobilinogen Urine 0.2 0.2 - 1.0 EU/dL    Nitrite Urine Negative NEG^Negative    Blood Urine Moderate (A) NEG^Negative    Leukocyte Esterase Urine Small (A) NEG^Negative    Source Midstream Urine     WBC Urine >100 (A) OTO5^0 - 5 /HPF    RBC Urine O - 2 OTO2^O - 2 /HPF    Bacteria Urine Many (A) NEG^Negative /HPF       ASSESSMENT/PLAN:       (N39.0) Urinary tract infection without hematuria, site unspecified  (primary encounter diagnosis)  (R30.0) Dysuria  (N32.89) Bladder spasms  (R35.0) Urinary frequency  (R82.90) Nonspecific finding on examination of urine  Comment: new recurrent dysuria " symptoms with urinary frequency and bladder spasms concerning for recurrent acute UTI.  Plan: I have prescribed ciprofloxacin (CIPRO) 500 MG tablet, phenazopyridine (AZO) 97.5 MG tablet for UTI treatment going forward.         Follow Up Plan:     Patient is instructed to return to Internal Medicine clinic for follow-up visit early next week if her current UTI symptoms does not improve.        Alexa Lei MD  Internal Medicine  [facility]

## 2018-06-15 NOTE — ED AVS SNAPSHOT
Emergency Department    [address] [place] [address]    Phone:  [phone]    Fax:  [phone]                                       Aura Broderick   MRN: [id number]    Department:   Emergency Department   Date of Visit:  6/15/2018           After Visit Summary Signature Page     I have received my discharge instructions, and my questions have been answered. I have discussed any challenges I see with this plan with the nurse or doctor.    ..........................................................................................................................................  Patient/Patient Representative Signature      ..........................................................................................................................................  Patient Representative Print Name and Relationship to Patient    ..................................................               ................................................  Date                                            Time    ..........................................................................................................................................  Reviewed by Signature/Title    ...................................................              ..............................................  Date                                                            Time

## 2018-06-15 NOTE — DISCHARGE INSTRUCTIONS

## 2018-06-15 NOTE — ED AVS SNAPSHOT
Emergency Department    [address] [facility] [address]    Phone:  [phone]    Fax:  [phone]                                       Aura Broderick   MRN: [id number]    Department:   Emergency Department   Date of Visit:  6/15/2018           Patient Information     Date Of Birth          9/25/1926        Your diagnoses for this visit were:     Abdominal pain, generalized        You were seen by Kailash Johnston MD.      Follow-up Information     Follow up with Mian Ramos MD In 1 day.    Specialty:  Internal Medicine    Contact information:    [address] [address]  [phone]          Discharge Instructions         Abdominal Pain    Abdominal pain is pain in the stomach or belly area. Everyone has this pain from time to time. In many cases it goes away on its own. But abdominal pain can sometimes be due to a serious problem, such as appendicitis. So it s important to know when to seek help.  Causes of abdominal pain  There are many possible causes of abdominal pain. Common causes in adults include:    Constipation, diarrhea, or gas    Stomach acid flowing back up into the esophagus (acid reflux or heartburn)    Severe acid reflux, called GERD (gastroesophageal reflux disease)    A sore in the lining of the stomach or small intestine (peptic ulcer)    Inflammation of the gallbladder, liver, or pancreas    Gallstones or kidney stones    Appendicitis     Intestinal blockage     An internal organ pushing through a muscle or other tissue (hernia)    Urinary tract infections    In women, menstrual cramps, fibroids, or endometriosis    Inflammation or infection of the intestines  Diagnosing the cause of abdominal pain  Your healthcare provider will do a physical exam help find the cause of your pain. If needed, tests will be ordered. Belly pain has many possible causes. So it can be hard to find the reason for your pain. Giving details about your pain can  help. Tell your provider where and when you feel the pain, and what makes it better or worse. Also let your provider know if you have other symptoms such as:    Fever    Tiredness    Upset stomach (nausea)    Vomiting    Changes in bathroom habits  Treating abdominal pain  Some causes of pain need emergency medical treatment right away. These include appendicitis or a bowel blockage. Other problems can be treated with rest, fluids, or medicines. Your healthcare provider can give you specific instructions for treatment or self-care based on what is causing your pain.  If you have vomiting or diarrhea, sip water or other clear fluids. When you are ready to eat solid foods again, start with small amounts of easy-to-digest, low-fat foods. These include apple sauce, toast, or crackers.   When to seek medical care  Call 911 or go to the hospital right away if you:    Can t pass stool and are vomiting    Are vomiting blood or have bloody diarrhea or black, tarry diarrhea    Have chest, neck, or shoulder pain    Feel like you might pass out    Have pain in your shoulder blades with nausea    Have sudden, severe belly pain    Have new, severe pain unlike any you have felt before    Have a belly that is rigid, hard, and tender to touch  Call your healthcare provider if you have:    Pain for more than 5 days    Bloating for more than 2 days    Diarrhea for more than 5 days    A fever of 100.4 F (38 C) or higher, or as directed by your healthcare provider    Pain that gets worse    Weight loss for no reason    Continued lack of appetite    Blood in your stool  How to prevent abdominal pain  Here are some tips to help prevent abdominal pain:    Eat smaller amounts of food at one time.    Avoid greasy, fried, or other high-fat foods.    Avoid foods that give you gas.    Exercise regularly.    Drink plenty of fluids.  To help prevent GERD symptoms:    Quit smoking.    Reduce alcohol and certain foods that increase stomach  acid.    Avoid aspirin and over-the-counter pain and fever medicines (NSAIDS or nonsteroidal anti-inflammatory drugs), if possible    Lose extra weight.    Finish eating at least 2 hours before you go to bed or lie down.    Raise the head of your bed.  Date Last Reviewed: 7/1/2016 2000-2017 [employer]. [address] [place] [address]. All rights reserved. This information is not intended as a substitute for professional medical care. Always follow your healthcare professional's instructions.          Your next 10 appointments already scheduled     Jun 18, 2018 11:30 AM CDT   Office Visit with RODY Guerra [facility] ([facility])    [address] [place] [address] [phone]           Bring a current list of meds and any records pertaining to this visit. For Physicals, please bring immunization records and any forms needing to be filled out. Please arrive 10 minutes early to complete paperwork.              24 Hour Appointment Hotline       To make an appointment at any [facility], call [phone] ([phone]). If you don't have a family doctor or clinic, we will help you find one. [place] clinics are conveniently located to serve the needs of you and your family.             Review of your medicines      Our records show that you are taking the medicines listed below. If these are incorrect, please call your family doctor or clinic.        Dose / Directions Last dose taken    amLODIPine 5 MG tablet   Commonly known as:  NORVASC   Dose:  5 mg   Quantity:  90 tablet        Take 1 tablet (5 mg) by mouth daily   Refills:  3        aspirin 81 MG EC tablet   Dose:  81 mg   Quantity:  50 tablet        Take 1 tablet (81 mg) by mouth daily   Refills:  1        ciprofloxacin 500 MG tablet   Commonly known as:  CIPRO   Dose:  500 mg   Quantity:  14 tablet        Take 1 tablet (500 mg) by mouth 2 times daily for 7 days   Refills:  1         Lactobacillus Rhamnosus (GG) capsule   Quantity:  90 capsule        TAKE 1 CAPSULE BY MOUTH ONCE DAILY   Refills:  3        * lisinopril 5 MG tablet   Commonly known as:  PRINIVIL/ZESTRIL   Dose:  5 mg   Quantity:  90 tablet        Take 1 tablet (5 mg) by mouth daily In addition to 10mg tablet to total 15mg   Refills:  3        * lisinopril 10 MG tablet   Commonly known as:  PRINIVIL/ZESTRIL   Dose:  10 mg   Quantity:  90 tablet        Take 1 tablet (10 mg) by mouth daily   Refills:  3        loperamide 2 MG tablet   Commonly known as:  IMODIUM A-D   Quantity:  30 tablet        Take 2 tabs (4 mg) after first loose stool, and then take one tab (2 mg) after each diarrheal stool.  Max of 8 tabs (16 mg) per day.   Refills:  0        multivitamin, therapeutic Tabs tablet   Quantity:  60 tablet        TAKE 1 TABLET BY MOUTH TWICE DAILY   Refills:  11        phenazopyridine 97.5 MG tablet   Commonly known as:  AZO   Dose:  97.5 mg   Quantity:  6 tablet        Take 1 tablet (97.5 mg) by mouth 2 times daily as needed for urinary tract discomfort   Refills:  1        senna-docusate 8.6-50 MG per tablet   Commonly known as:  SENOKOT-S;PERICOLACE   Dose:  1 tablet        Take 1 tablet by mouth At Bedtime   Refills:  0        simvastatin 40 MG tablet   Commonly known as:  ZOCOR   Quantity:  90 tablet        TAKE 1 TABLET(40 MG) BY MOUTH AT BEDTIME   Refills:  2        VITAMIN D (CHOLECALCIFEROL) PO   Dose:  2000 Units        Take 2,000 Units by mouth daily   Refills:  0        * Notice:  This list has 2 medication(s) that are the same as other medications prescribed for you. Read the directions carefully, and ask your doctor or other care provider to review them with you.            Procedures and tests performed during your visit     Abd/pelvis CT no contrast - Stone Protocol    CBC with platelets + differential    Comprehensive metabolic panel    Lactic acid    US Renal Complete      Orders Needing Specimen Collection      None      Pending Results     Date and Time Order Name Status Description    6/15/2018 1238 Abd/pelvis CT no contrast - Stone Protocol Preliminary     6/15/2018 1048 US Renal Complete Preliminary     6/14/2018 1824 URINE CULTURE AEROBIC BACTERIAL In process             Pending Culture Results     Date and Time Order Name Status Description    6/14/2018 1824 URINE CULTURE AEROBIC BACTERIAL In process             Pending Results Instructions     If you had any lab results that were not finalized at the time of your Discharge, you can call the ED Lab Result RN at [phone]. You will be contacted by this team for any positive Lab results or changes in treatment. The nurses are available 7 days a week from 10A to 6:30P.  You can leave a message 24 hours per day and they will return your call.        Test Results From Your Hospital Stay        6/15/2018 12:14 PM      Narrative     ULTRASOUND RENAL COMPLETE  6/15/2018 11:29 AM     HISTORY:  Dysuria, frequent UTI.     COMPARISON: Chest abdomen and pelvis CT from 1/8/2016.    FINDINGS: The right kidney measures 10.5 x 5.0 x 4.3 cm with an AP  cortical thickness of 0.9 cm. Normal echogenicity. Cyst versus dilated  calyx upper pole right kidney. This area measures 2.6 x 1.7 x 1.5 cm.    The left kidney measures 9.9 x 5.1 x 4.3 cm with an AP cortical  thickness of 0.8 cm. No hydronephrosis or cysts seen.    Small amount of debris within the bladder.        Impression     IMPRESSION: Area of calyceal dilatation versus peripelvic renal cyst  upper pole right kidney. Small amount of debris within the bladder.         6/15/2018  1:00 PM      Component Results     Component Value Ref Range & Units Status    WBC 8.0 4.0 - 11.0 10e9/L Final    RBC Count 4.60 3.8 - 5.2 10e12/L Final    Hemoglobin 13.8 11.7 - 15.7 g/dL Final    Hematocrit 42.4 35.0 - 47.0 % Final    MCV 92 78 - 100 fl Final    MCH 30.0 26.5 - 33.0 pg Final    MCHC 32.5 31.5 - 36.5 g/dL Final    RDW 13.5 10.0 -  15.0 % Final    Platelet Count 251 150 - 450 10e9/L Final    Diff Method Automated Method  Final    % Neutrophils 70.1 % Final    % Lymphocytes 18.5 % Final    % Monocytes 7.2 % Final    % Eosinophils 3.6 % Final    % Basophils 0.3 % Final    % Immature Granulocytes 0.3 % Final    Nucleated RBCs 0 0 /100 Final    Absolute Neutrophil 5.6 1.6 - 8.3 10e9/L Final    Absolute Lymphocytes 1.5 0.8 - 5.3 10e9/L Final    Absolute Monocytes 0.6 0.0 - 1.3 10e9/L Final    Absolute Eosinophils 0.3 0.0 - 0.7 10e9/L Final    Absolute Basophils 0.0 0.0 - 0.2 10e9/L Final    Abs Immature Granulocytes 0.0 0 - 0.4 10e9/L Final    Absolute Nucleated RBC 0.0  Final         6/15/2018  1:17 PM      Component Results     Component Value Ref Range & Units Status    Sodium 139 133 - 144 mmol/L Final    Potassium 4.3 3.4 - 5.3 mmol/L Final    Chloride 105 94 - 109 mmol/L Final    Carbon Dioxide 26 20 - 32 mmol/L Final    Anion Gap 8 3 - 14 mmol/L Final    Glucose 84 70 - 99 mg/dL Final    Urea Nitrogen 27 7 - 30 mg/dL Final    Creatinine 1.30 (H) 0.52 - 1.04 mg/dL Final    GFR Estimate 38 (L) >60 mL/min/1.7m2 Final    Non  GFR Calc    GFR Estimate If Black 46 (L) >60 mL/min/1.7m2 Final    African American GFR Calc    Calcium 9.2 8.5 - 10.1 mg/dL Final    Bilirubin Total 0.5 0.2 - 1.3 mg/dL Final    Albumin 3.5 3.4 - 5.0 g/dL Final    Protein Total 7.5 6.8 - 8.8 g/dL Final    Alkaline Phosphatase 81 40 - 150 U/L Final    ALT 15 0 - 50 U/L Final    AST 16 0 - 45 U/L Final         6/15/2018  1:01 PM      Component Results     Component Value Ref Range & Units Status    Lactic Acid 1.0 0.7 - 2.0 mmol/L Final         6/15/2018  1:59 PM      Narrative     CT ABDOMEN AND PELVIS WITHOUT CONTRAST   6/15/2018 1:41 PM     HISTORY: Intermittent diffuse abdominal pain.     TECHNIQUE:   No IV contrast material. Radiation dose for this scan was  reduced using automated exposure control, adjustment of the mA and/or  kV according to patient  size, or iterative reconstruction technique.    COMPARISON: CT scan from 1/8/2016. Ultrasound from today.    FINDINGS:  Included lung bases are unremarkable.    There is a small calcification in the right lobe of the liver likely a  calcified granuloma. Liver is otherwise unremarkable. There are a few  radiopaque densities in the gallbladder that could represent small  gallstones. No pericholecystic inflammation or biliary dilatation. The  spleen and pancreas are unremarkable. No adrenal lesions. No kidney  stones or hydronephrosis. There appear to be peripelvic renal cysts in  the lower pole of the right kidney similar to the prior study. No  kidney stones or hydronephrosis.     Scans through the pelvis demonstrate a normal-appearing bladder.  Scattered colonic diverticuli but no evidence of diverticulitis. No  evidence of appendicitis. No thickened bowel wall or evidence of bowel  obstruction. No free air or free fluid.        Impression     IMPRESSION:  1. Possible small gallstones. No pericholecystic inflammation or  fluid. No biliary dilatation.  2. No kidney stones or hydronephrosis.  3. Scattered colonic diverticuli but no evidence of diverticulitis. No  appendicitis.                Clinical Quality Measure: Blood Pressure Screening     Your blood pressure was checked while you were in the emergency department today. The last reading we obtained was  BP: 167/88 . Please read the guidelines below about what these numbers mean and what you should do about them.  If your systolic blood pressure (the top number) is less than 120 and your diastolic blood pressure (the bottom number) is less than 80, then your blood pressure is normal. There is nothing more that you need to do about it.  If your systolic blood pressure (the top number) is 120-139 or your diastolic blood pressure (the bottom number) is 80-89, your blood pressure may be higher than it should be. You should have your blood pressure rechecked within a  "year by a primary care provider.  If your systolic blood pressure (the top number) is 140 or greater or your diastolic blood pressure (the bottom number) is 90 or greater, you may have high blood pressure. High blood pressure is treatable, but if left untreated over time it can put you at risk for heart attack, stroke, or kidney failure. You should have your blood pressure rechecked by a primary care provider within the next 4 weeks.  If your provider in the emergency department today gave you specific instructions to follow-up with your doctor or provider even sooner than that, you should follow that instruction and not wait for up to 4 weeks for your follow-up visit.        Thank you for choosing [facility]       Thank you for choosing [facility] for your care. Our goal is always to provide you with excellent care. Hearing back from our patients is one way we can continue to improve our services. Please take a few minutes to complete the written survey that you may receive in the mail after you visit with us. Thank you!        [employer] Information     [employer] lets you send messages to your doctor, view your test results, renew your prescriptions, schedule appointments and more. To sign up, go to [email or web address] . Click on \"Log in\" on the left side of the screen, which will take you to the Welcome page. Then click on \"Sign up Now\" on the right side of the page.     You will be asked to enter the access code listed below, as well as some personal information. Please follow the directions to create your username and password.     Your access code is: [email or web address]  Expires: 2018  9:55 PM     Your access code will  in 90 days. If you need help or a new code, please call your [facility] clinic or [phone].        Care EveryWhere ID     This is your Care EveryWhere ID. This could be used by other organizations to access your [facility] medical records  [id number]        Equal Access to Services     SUE" GERARD : Stellaii shakir Palumbo, wavernellda luqadaha, qaybta kaangelito ford, saroj mendoza. So [facility] [phone].    ATENCIÓN: Si habla español, tiene a keita disposición servicios gratuitos de asistencia lingüística. Llame al [phone].    We comply with applicable federal civil rights laws and [place] laws. We do not discriminate on the basis of race, color, national origin, age, disability, sex, sexual orientation, or gender identity.            After Visit Summary       This is your record. Keep this with you and show to your community pharmacist(s) and doctor(s) at your next visit.

## 2018-06-18 NOTE — TELEPHONE ENCOUNTER
Left message asking for nurse at facility to call back to further triage constipation symptoms     Also routing request to provider - see below facility is requesting fax for suppository or enema orders    Please advise     Nolvia MARTINEZ RN

## 2018-06-18 NOTE — TELEPHONE ENCOUNTER
Reason for Call: Request for an order or referral:    Order or referral being requested: Enema and/or suppository as needed     Date needed: as soon as possible    Has the patient been seen by the PCP for this problem? NO    Additional comments: [facility] Patient is constipated  states it's been 5 days Fax [phone]    Phone number Patient can be reached at:  Other phone number:  [phone]    Best Time:  Anytime     Can we leave a detailed message on this number?  YES    Call taken on 6/18/2018 at 3:45 PM by Kristy Santos

## 2018-06-19 NOTE — TELEPHONE ENCOUNTER
This needs actual order  For facility to admin.  Please review what is pended.   Route back for faxing.  Oneyda Cancino RN

## 2018-06-21 NOTE — TELEPHONE ENCOUNTER
Reason for Call:  Medication or medication refill:    Do you use a [facility]?  Name of the pharmacy and phone number for the current request:       [facility] - [place], [place] - [address] [place] [address]      Name of the medication requested: Enema     Other request: the Suppository didn't work    Can we leave a detailed message on this number? YES    Phone number patient can be reached at: [phone] (Aura)    Best Time: anytime    Call taken on 6/21/2018 at 3:29 PM by Deejay Lau

## 2018-06-27 NOTE — PROGRESS NOTES
"[facility]  PRIMARY CARE PROVIDER AND CLINIC:  Davey Thornton [address] / [place] [address]  Chief Complaint   Patient presents with     [facility] Medical Record Number:  [id number]    HPI:    Aura Broderick is a 89 year old  (9/25/1926),admitted to the [facility] from [facility].  Admitted to this facility for  medical management and nursing care.  HPI information obtained from: facility chart records, [facility] chart review and family/first contact  Sukumar milton.      Aura (Sandra) seen today with her  present in their memory care unit has PMH which includes, cognitive loss, HTN, increased lipids, MI, UTIs, constipation who first moved into [facility] with her  in 10/2017 transferring to [facility] for increased services 2/2018 when her  was hospitalized. He has moved into [facility] with her. Today during our visit he provides most of her history.     Current issues are:        Personal history of urinary tract infection  Most recently in ED 5/29 for condition and again 6/15 for ongoing abdominal pain. She denies hematuria, dysuria today. Has prn phenazopyridine (no recent use) and scheduled cranberry tabs.     Essential hypertension  Hyperlipidemia LDL goal <70  Coronary artery disease involving native heart without angina pectoris, unspecified vessel or lesion type  BP on lower side today. Some orthostatic BP noted in chart review  She denies lightheadedness, chest pain. Norvasc, Lisinopril, ASA, Statin daily. Trace edema in legs, no Kushal Hose. Last Echo grossly normal.    Cognitive impairment  No formal dx of dementia. Last SLUMS 14/30. Today she is conversational appropriate superficially but defers to her  for most responses to questions.    Other constipation  Reports she has gone \"ten days without a bowel movement before\". Denies abdominal pain, nausea, vomiting.  reports " Miralax daily will be starting.    ACP (advance care planning)  Completed with previous PCP but not faxed into electronic chart.      CODE STATUS/ADVANCE DIRECTIVES DISCUSSION: DNR  Patient's living condition: lives in an assisted living facility-memory care    ALLERGIES:Contrast dye  PAST MEDICAL HISTORY:  has a past medical history of Dizziness and giddiness; Essential hypertension, benign; Myocardial infarction; Other and unspecified hyperlipidemia; Palpitations; and Pure hypercholesterolemia.  PAST SURGICAL HISTORY:  has a past surgical history that includes NONSPECIFIC PROCEDURE; NONSPECIFIC PROCEDURE; NONSPECIFIC PROCEDURE (06-7-99); Coronary Angiography Adult Order (2008-[facility]); and Closed reduction, percutaneous pinning hip (Left, 3/16/2016).  FAMILY HISTORY: family history includes Cancer in her brother and mother; HEART DISEASE (age of onset: 83) in her father.  SOCIAL HISTORY:  reports that she has quit smoking. She quit after 10.00 years of use. She has never used smokeless tobacco. She reports that she drinks alcohol. She reports that she does not use illicit drugs.    Post Discharge Medication Reconciliation Status: patient was not discharged from an inpatient facility.  Current Outpatient Prescriptions   Medication Sig Dispense Refill     amLODIPine (NORVASC) 5 MG tablet Take 1 tablet (5 mg) by mouth daily 90 tablet 3     AMOXICILLIN PO Take 2,000 mg by mouth as needed (1 hour prior to dental apt.)       aspirin 81 MG EC tablet Take 1 tablet (81 mg) by mouth daily 50 tablet 1     Bisacodyl 10 MG/30ML ENEM Place 30 mLs (10 mg) rectally as needed 30 mL 3     Cranberry (THERACRAN ONE) 360 MG CAPS Take 1 capsule by mouth 2 times daily       glycerin, adult, 2 g SUPP Suppository Place 1 suppository rectally daily as needed for constipation 15 suppository 0     Lactobacillus Rhamnosus, GG, ([facility] & [employer]) capsule TAKE 1 CAPSULE BY MOUTH ONCE DAILY 90 capsule 3     lisinopril  (PRINIVIL/ZESTRIL) 10 MG tablet Take 1 tablet (10 mg) by mouth daily 90 tablet 3     lisinopril (PRINIVIL/ZESTRIL) 5 MG tablet Take 1 tablet (5 mg) by mouth daily In addition to 10mg tablet to total 15mg 90 tablet 3     loperamide (IMODIUM A-D) 2 MG tablet Take 2 tabs (4 mg) after first loose stool, and then take one tab (2 mg) after each diarrheal stool.  Max of 8 tabs (16 mg) per day. 30 tablet 0     PHENAZOPYRIDINE HCL PO Take 100 mg by mouth 2 times daily as needed for irritation       polyethylene glycol (MIRALAX/GLYCOLAX) Packet Take 17 g by mouth daily       senna-docusate (SENOKOT-S;PERICOLACE) 8.6-50 MG per tablet Take 1 tablet by mouth At Bedtime       simvastatin (ZOCOR) 40 MG tablet TAKE 1 TABLET(40 MG) BY MOUTH AT BEDTIME 90 tablet 2     VITAMIN D, CHOLECALCIFEROL, PO Take 2,000 Units by mouth daily         ROS:  Limited secondary to cognitive impairment but today pt reports fine    Exam:  /63  Pulse 80  Temp 99.2  F (37.3  C)  SpO2 96%  GENERAL APPEARANCE:  Alert, in no distress  ENT:  Mouth and posterior oropharynx normal, moist mucous membranes  EYES:  EOM, conjunctivae, lids, pupils and irises normal  NECK:  No adenopathy,masses or thyromegaly  RESP:  respiratory effort and palpation of chest normal, diminished breath sounds throughout  CV:  Palpation and auscultation of heart done , regular rate and rhythm, no murmur, rub, or gallop, peripheral edema gtrace+ in legs, ankles and feet  ABDOMEN:  normal bowel sounds, soft, nontender, no hepatosplenomegaly or other masses  M/S:   Gait and station at baseline wiht walker  SKIN:  pale and dry to visable areas  PSYCH:  insight and judgement impaired    Lab/Diagnostic data:  CBC RESULTS:   Recent Labs   Lab Test  06/15/18   1250  05/29/18 2011   WBC  8.0  5.3   RBC  4.60  4.32   HGB  13.8  13.0   HCT  42.4  39.6   MCV  92  92   MCH  30.0  30.1   MCHC  32.5  32.8   RDW  13.5  13.3   PLT  251  239       Last Basic Metabolic Panel:  Recent Labs    Lab Test  06/15/18   1250  05/29/18 2011   NA  139  142   POTASSIUM  4.3  3.9   CHLORIDE  105  107   LEFTY  9.2  9.0   CO2  26  29   BUN  27  29   CR  1.30*  1.15*   GLC  84  102*       Liver Function Studies -   Recent Labs   Lab Test  06/15/18   1250  05/29/18 2011   PROTTOTAL  7.5  6.5*   ALBUMIN  3.5  3.4   BILITOTAL  0.5  0.5   ALKPHOS  81  75   AST  16  17   ALT  15  15       TSH   Date Value Ref Range Status   07/26/2013 1.640 mcU/mL Final   06/27/2012 1.190 mcU/mL Final       ASSESSMENT/PLAN:  (Z87.440) Personal history of urinary tract infection  (primary encounter diagnosis)  Comment: Hx of   Plan:   -Continue current plan of care  -Monitor for s/s of infection  -Consider estradiol cream     (I10) Essential hypertension  (E78.5) Hyperlipidemia LDL goal <70  (I25.10) Coronary artery disease involving native heart without angina pectoris, unspecified vessel or lesion type  Comment: Chronic- with some lower BPs noted  Plan:   -VS weekly, weights monthly  -Continue current plan of care. Possible reduction in future  -BMP periodically     (R41.89) Cognitive impairment  Comment: Advancing now resides in memory care  Plan:   -Support from spouse. Encourage use of staff assist    (K59.09) Other constipation  Comment: Chronic and ongoing  Plan:   -Bisacodyl and Glycerin suppository prn   -Miralax 17g daily  -Senna S 1 tablet daily at HS  -Monitor for medication adjustment need. Monitor bowel status    (Z71.89) ACP (advance care planning)  Comment:Ongoing   Plan:  -Emy to fax updated POLST to [facility]        Electronically signed by:  RODY Chaves CNP

## 2018-06-27 NOTE — LETTER
6/27/2018        RE: Aura Broderick  [facility]  [address]  [address]  [facility] [address]        [facility] [facility]  PRIMARY CARE PROVIDER AND CLINIC:  Davey Thornton [address] / [facility] [address]  Chief Complaint   Patient presents with     [facility] Medical Record Number:  [id number]    HPI:    Aura Broderick is a 89 year old  (9/25/1926),admitted to the [facility] from [facility].  Admitted to this facility for  medical management and nursing care.  HPI information obtained from: facility chart records, [facility] chart review and family/first contact  Sukumar report.      Aura (Sandra) seen today with her  present in their memory care unit has PMH which includes, cognitive loss, HTN, increased lipids, MI, UTIs, constipation who first moved into [facility] with her  in 10/2017 transferring to [facility] for increased services 2/2018 when her  was hospitalized. He has moved into [facility] with her. Today during our visit he provides most of her history.     Current issues are:        Personal history of urinary tract infection  Most recently in ED 5/29 for condition and again 6/15 for ongoing abdominal pain. She denies hematuria, dysuria today. Has prn phenazopyridine (no recent use) and scheduled cranberry tabs.     Essential hypertension  Hyperlipidemia LDL goal <70  Coronary artery disease involving native heart without angina pectoris, unspecified vessel or lesion type  BP on lower side today. Some orthostatic BP noted in chart review  She denies lightheadedness, chest pain. Norvasc, Lisinopril, ASA, Statin daily. Trace edema in legs, no Kushal Hose. Last Echo grossly normal.    Cognitive impairment  No formal dx of dementia. Last SLUMS 14/30. Today she is conversational appropriate superficially but defers to her  for most responses to questions.    Other  "constipation  Reports she has gone \"ten days without a bowel movement before\". Denies abdominal pain, nausea, vomiting.  reports Miralax daily will be starting.    ACP (advance care planning)  Completed with previous PCP but not faxed into electronic chart.      CODE STATUS/ADVANCE DIRECTIVES DISCUSSION: DNR  Patient's living condition: lives in an assisted living facility-memory care    ALLERGIES:Contrast dye  PAST MEDICAL HISTORY:  has a past medical history of Dizziness and giddiness; Essential hypertension, benign; Myocardial infarction; Other and unspecified hyperlipidemia; Palpitations; and Pure hypercholesterolemia.  PAST SURGICAL HISTORY:  has a past surgical history that includes NONSPECIFIC PROCEDURE; NONSPECIFIC PROCEDURE; NONSPECIFIC PROCEDURE (06-7-99); Coronary Angiography Adult Order (2008-[facility]); and Closed reduction, percutaneous pinning hip (Left, 3/16/2016).  FAMILY HISTORY: family history includes Cancer in her brother and mother; HEART DISEASE (age of onset: 83) in her father.  SOCIAL HISTORY:  reports that she has quit smoking. She quit after 10.00 years of use. She has never used smokeless tobacco. She reports that she drinks alcohol. She reports that she does not use illicit drugs.    Post Discharge Medication Reconciliation Status: patient was not discharged from an inpatient facility.  Current Outpatient Prescriptions   Medication Sig Dispense Refill     amLODIPine (NORVASC) 5 MG tablet Take 1 tablet (5 mg) by mouth daily 90 tablet 3     AMOXICILLIN PO Take 2,000 mg by mouth as needed (1 hour prior to dental apt.)       aspirin 81 MG EC tablet Take 1 tablet (81 mg) by mouth daily 50 tablet 1     Bisacodyl 10 MG/30ML ENEM Place 30 mLs (10 mg) rectally as needed 30 mL 3     Cranberry (THERACRAN ONE) 360 MG CAPS Take 1 capsule by mouth 2 times daily       glycerin, adult, 2 g SUPP Suppository Place 1 suppository rectally daily as needed for constipation 15 suppository 0     " Lactobacillus Rhamnosus, GG, ([facility] [employer]) capsule TAKE 1 CAPSULE BY MOUTH ONCE DAILY 90 capsule 3     lisinopril (PRINIVIL/ZESTRIL) 10 MG tablet Take 1 tablet (10 mg) by mouth daily 90 tablet 3     lisinopril (PRINIVIL/ZESTRIL) 5 MG tablet Take 1 tablet (5 mg) by mouth daily In addition to 10mg tablet to total 15mg 90 tablet 3     loperamide (IMODIUM A-D) 2 MG tablet Take 2 tabs (4 mg) after first loose stool, and then take one tab (2 mg) after each diarrheal stool.  Max of 8 tabs (16 mg) per day. 30 tablet 0     PHENAZOPYRIDINE HCL PO Take 100 mg by mouth 2 times daily as needed for irritation       polyethylene glycol (MIRALAX/GLYCOLAX) Packet Take 17 g by mouth daily       senna-docusate (SENOKOT-S;PERICOLACE) 8.6-50 MG per tablet Take 1 tablet by mouth At Bedtime       simvastatin (ZOCOR) 40 MG tablet TAKE 1 TABLET(40 MG) BY MOUTH AT BEDTIME 90 tablet 2     VITAMIN D, CHOLECALCIFEROL, PO Take 2,000 Units by mouth daily         ROS:  Limited secondary to cognitive impairment but today pt reports fine    Exam:  /63  Pulse 80  Temp 99.2  F (37.3  C)  SpO2 96%  GENERAL APPEARANCE:  Alert, in no distress  ENT:  Mouth and posterior oropharynx normal, moist mucous membranes  EYES:  EOM, conjunctivae, lids, pupils and irises normal  NECK:  No adenopathy,masses or thyromegaly  RESP:  respiratory effort and palpation of chest normal, diminished breath sounds throughout  CV:  Palpation and auscultation of heart done , regular rate and rhythm, no murmur, rub, or gallop, peripheral edema gtrace+ in legs, ankles and feet  ABDOMEN:  normal bowel sounds, soft, nontender, no hepatosplenomegaly or other masses  M/S:   Gait and station at baseline wiht walker  SKIN:  pale and dry to visable areas  PSYCH:  insight and judgement impaired    Lab/Diagnostic data:  CBC RESULTS:   Recent Labs   Lab Test  06/15/18   1250  05/29/18 2011   WBC  8.0  5.3   RBC  4.60  4.32   HGB  13.8  13.0   HCT  42.4  39.6    MCV  92  92   MCH  30.0  30.1   MCHC  32.5  32.8   RDW  13.5  13.3   PLT  251  239       Last Basic Metabolic Panel:  Recent Labs   Lab Test  06/15/18   1250  05/29/18 2011   NA  139  142   POTASSIUM  4.3  3.9   CHLORIDE  105  107   LEFTY  9.2  9.0   CO2  26  29   BUN  27  29   CR  1.30*  1.15*   GLC  84  102*       Liver Function Studies -   Recent Labs   Lab Test  06/15/18   1250  05/29/18 2011   PROTTOTAL  7.5  6.5*   ALBUMIN  3.5  3.4   BILITOTAL  0.5  0.5   ALKPHOS  81  75   AST  16  17   ALT  15  15       TSH   Date Value Ref Range Status   07/26/2013 1.640 mcU/mL Final   06/27/2012 1.190 mcU/mL Final       ASSESSMENT/PLAN:  (Z87.440) Personal history of urinary tract infection  (primary encounter diagnosis)  Comment: Hx of   Plan:   -Continue current plan of care  -Monitor for s/s of infection  -Consider estradiol cream     (I10) Essential hypertension  (E78.5) Hyperlipidemia LDL goal <70  (I25.10) Coronary artery disease involving native heart without angina pectoris, unspecified vessel or lesion type  Comment: Chronic- with some lower BPs noted  Plan:   -VS weekly, weights monthly  -Continue current plan of care. Possible reduction in future  -BMP periodically     (R41.89) Cognitive impairment  Comment: Advancing now resides in memory care  Plan:   -Support from spouse. Encourage use of staff assist    (K59.09) Other constipation  Comment: Chronic and ongoing  Plan:   -Bisacodyl and Glycerin suppository prn   -Miralax 17g daily  -Senna S 1 tablet daily at HS  -Monitor for medication adjustment need. Monitor bowel status    (Z71.89) ACP (advance care planning)  Comment:Ongoing   Plan:  -Emy to fax updated POLST to [facility]        Electronically signed by:  RODY Chaves CNP                    Sincerely,        RODY Chaves CNP

## 2018-08-29 NOTE — LETTER
"    8/29/2018        RE: Aura Broderick  [facility]  [address]  [address]  [facility] [address]        Aura Broderick is a 89 year old female seen August 29, 2018 at [facility] where she has resided for 10 months (admit 10/2017), recently transferred to Memory Care unit and to [facility], seen for initial visit   Patient is seen in her apartment with her  present, and he helps with history.     She has had cognitive decline over the past couple of years, now needs assist with cares and they have moved together to Memory Care unit.    She ambulates on her own, remembers to use [facility] \"most of the time.\"    No recent falls.     Patient has had bouts of diarrhea, but seems to be related to to treatment for contstipation, seems to be better when Miralax dose decreased.    They do not want to change meds today given things seem to be going well.   Reports she is eating well and weight is stable.       Past Medical History:   Diagnosis Date     Dizziness and giddiness      Essential hypertension, benign      Myocardial infarction     LAD - stent - [facility]     Other and unspecified hyperlipidemia      Palpitations      Pure hypercholesterolemia    S/p left hip fracture, 2016    Past Surgical History:   Procedure Laterality Date     C NONSPECIFIC PROCEDURE      tonsillectomy     C NONSPECIFIC PROCEDURE      D&C     C NONSPECIFIC PROCEDURE  06-7-99    COLONOSCOPY      CLOSED REDUCTION, PERCUTANEOUS PINNING HIP Left 3/16/2016    Procedure: CLOSED REDUCTION, PERCUTANEOUS PINNING HIP;  Surgeon: Sukh Kebede MD;  Location:  OR     CORONARY ANGIOGRAPHY ADULT ORDER  2008-[facility]       SH:  , lives with her  in Memory Care unit.   They have 2 sons and a daughter     ROS: h/o UTI, last seen in ER in June 2018   Remains on cranberry tabs        CPT 4.4    SLUMS 14/30    Normal stress ECHO in [place], [place] 2014     Wt Readings from Last 5 Encounters:   06/15/18 118 lb " (53.5 kg)   06/14/18 118 lb (53.5 kg)   05/29/18 118 lb (53.5 kg)   08/16/17 117 lb (53.1 kg)   06/16/17 120 lb 6.4 oz (54.6 kg)      EXAM:  Pleasant, NAD  /63  Pulse 80  Temp 99.2  F (37.3  C)  SpO2 96%  Neck supple without adenopathy  Lungs clear bilaterally with good air movement  Heart RRR s1s2 at 85   1/6 LARY  Abd soft, NT, no distention, +BS  Ext without edema  Neuro: STML, limited history, no focal findings  Psych: affect okay        Lab Results   Component Value Date    WBC 8.0 06/15/2018      HGB 13.8 06/15/2018      MCV 92 06/15/2018       06/15/2018     TSH   Date Value Ref Range Status   07/26/2013 1.640 mcU/mL Final      Last Comprehensive Metabolic Panel:  Sodium   Date Value Ref Range Status   06/15/2018 139 133 - 144 mmol/L Final     Potassium   Date Value Ref Range Status   06/15/2018 4.3 3.4 - 5.3 mmol/L Final     Chloride   Date Value Ref Range Status   06/15/2018 105 94 - 109 mmol/L Final     Carbon Dioxide   Date Value Ref Range Status   06/15/2018 26 20 - 32 mmol/L Final     Anion Gap   Date Value Ref Range Status   06/15/2018 8 3 - 14 mmol/L Final     Glucose   Date Value Ref Range Status   06/15/2018 84 70 - 99 mg/dL Final     Urea Nitrogen   Date Value Ref Range Status   06/15/2018 27 7 - 30 mg/dL Final     Creatinine   Date Value Ref Range Status   06/15/2018 1.30 (H) 0.52 - 1.04 mg/dL Final     GFR Estimate   Date Value Ref Range Status   06/15/2018 38 (L) >60 mL/min/1.7m2 Final     Comment:     Non  GFR Calc     Calcium   Date Value Ref Range Status   06/15/2018 9.2 8.5 - 10.1 mg/dL Final       IMP/PLAN:     (I12.9,  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       (R19.8) Alternating constipation and diarrhea  Comment: ongoing, seems to be well managed at this point    Plan: reviewed regimen    Also takes Culturelle, has multiple prns available.        (G30.1,  F02.80) Late onset Alzheimer's disease without behavioral disturbance  Comment: scores as above, low functional status     Plan: [place] Memory care unit for assist with meds, meals, activity and personal cares.        (M81.0) Senile osteoporosis  Comment: no current fracture, h/o hip fracture 2 years ago  Plan: continue vit D, dietary calcium.        Mckayla Huitron MD       Sincerely,        Mckayla Huitron MD

## 2018-08-29 NOTE — PROGRESS NOTES
"Aura Broderick is a 89 year old female seen August 29, 2018 at [facility] where she has resided for 10 months (admit 10/2017), recently transferred to Memory Care unit and to [facility], seen for initial visit   Patient is seen in her apartment with her  present, and he helps with history.     She has had cognitive decline over the past couple of years, now needs assist with cares and they have moved together to Memory Care unit.    She ambulates on her own, remembers to use [facility] \"most of the time.\"    No recent falls.     Patient has had bouts of diarrhea, but seems to be related to to treatment for contstipation, seems to be better when Miralax dose decreased.    They do not want to change meds today given things seem to be going well.   Reports she is eating well and weight is stable.       Past Medical History:   Diagnosis Date     Dizziness and giddiness      Essential hypertension, benign      Myocardial infarction     LAD - stent - [facility]     Other and unspecified hyperlipidemia      Palpitations      Pure hypercholesterolemia    S/p left hip fracture, 2016    Past Surgical History:   Procedure Laterality Date     C NONSPECIFIC PROCEDURE      tonsillectomy     C NONSPECIFIC PROCEDURE      D&C     C NONSPECIFIC PROCEDURE  06-7-99    COLONOSCOPY      CLOSED REDUCTION, PERCUTANEOUS PINNING HIP Left 3/16/2016    Procedure: CLOSED REDUCTION, PERCUTANEOUS PINNING HIP;  Surgeon: Sukh Kebede MD;  Location:  OR     CORONARY ANGIOGRAPHY ADULT ORDER  2008-[facility]    stent       SH:  , lives with her  in Memory Care unit.   They have 2 sons and a daughter     ROS: h/o UTI, last seen in ER in June 2018   Remains on cranberry tabs        CPT 4.4    SLUMS 14/30    Normal stress ECHO in [place], [place] 2014     Wt Readings from Last 5 Encounters:   06/15/18 118 lb (53.5 kg)   06/14/18 118 lb (53.5 kg)   05/29/18 118 lb (53.5 kg)   08/16/17 117 lb (53.1 kg)   06/16/17 120 lb 6.4 oz " (54.6 kg)      EXAM:  Pleasant, NAD  /63  Pulse 80  Temp 99.2  F (37.3  C)  SpO2 96%  Neck supple without adenopathy  Lungs clear bilaterally with good air movement  Heart RRR s1s2 at 85   1/6 LARY  Abd soft, NT, no distention, +BS  Ext without edema  Neuro: STML, limited history, no focal findings  Psych: affect okay        Lab Results   Component Value Date    WBC 8.0 06/15/2018      HGB 13.8 06/15/2018      MCV 92 06/15/2018       06/15/2018     TSH   Date Value Ref Range Status   07/26/2013 1.640 mcU/mL Final      Last Comprehensive Metabolic Panel:  Sodium   Date Value Ref Range Status   06/15/2018 139 133 - 144 mmol/L Final     Potassium   Date Value Ref Range Status   06/15/2018 4.3 3.4 - 5.3 mmol/L Final     Chloride   Date Value Ref Range Status   06/15/2018 105 94 - 109 mmol/L Final     Carbon Dioxide   Date Value Ref Range Status   06/15/2018 26 20 - 32 mmol/L Final     Anion Gap   Date Value Ref Range Status   06/15/2018 8 3 - 14 mmol/L Final     Glucose   Date Value Ref Range Status   06/15/2018 84 70 - 99 mg/dL Final     Urea Nitrogen   Date Value Ref Range Status   06/15/2018 27 7 - 30 mg/dL Final     Creatinine   Date Value Ref Range Status   06/15/2018 1.30 (H) 0.52 - 1.04 mg/dL Final     GFR Estimate   Date Value Ref Range Status   06/15/2018 38 (L) >60 mL/min/1.7m2 Final     Comment:     Non  GFR Calc     Calcium   Date Value Ref Range Status   06/15/2018 9.2 8.5 - 10.1 mg/dL Final       IMP/PLAN:     (I12.9,  N18.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       (R19.8)  Alternating constipation and diarrhea  Comment: ongoing, seems to be well managed at this point    Plan: reviewed regimen    Also takes Culturelle, has multiple prns available.        (G30.1,  F02.80) Late onset Alzheimer's disease without behavioral disturbance  Comment: scores as above, low functional status     Plan: [place] Memory care unit for assist with meds, meals, activity and personal cares.        (M81.0) Senile osteoporosis  Comment: no current fracture, h/o hip fracture 2 years ago  Plan: continue vit D, dietary calcium.        Mckayla Huitron MD

## 2018-09-09 PROBLEM — R19.8 ALTERNATING CONSTIPATION AND DIARRHEA: Status: ACTIVE | Noted: 2018-01-01

## 2018-10-03 NOTE — LETTER
10/3/2018        RE: Aura Broderick  [facility]  [address]  [address]  [facility] [address]        [place]  Chief Complaint   Patient presents with     Annual Comprehensive Exam Assisted Living       [place] Medical Record Number:  [id number]    HPI:    Aura Broderick is a 89 year old  (9/25/1926), who is being seen today for an annual comprehensive visit at [place] [facility] -memory care  HPI information obtained from: facility chart records and family/first contact  report.  Today's concerns are:    Other constipation  Her  is reporting it has been a few days since last bowel movement. Miralax daily and noted given in facility charts but he is stating she does not take this daily. Senna-S is prn and he is wanting to hold on any changes. She denies pain, abdominal pain, nausea or vomiting.    Coronary artery disease involving native heart without angina pectoris, unspecified vessel or lesion type  hypertension with CKD  Denies lightheadedness or chest pain. History of some orthostatic BP in chart review. Daily Lisinopril, ASA, Statin, Norvasc. Trace edema in legs and no use of Kushal Hose. No updated weights in facility records.     Late onset Alzheimer's disease without behavioral disturbance  Resides in memory care with her . With her decline and need of more assistance they both moved into a unit together. Ambulates with 4WW.     Personal history of urinary tract infection  ED 5/29 for condition and again 6/15 for ongoing abdominal/pelvic pain and bladder spasms. She denies hematuria, dysuria today. Has prn phenazopyridine (no recent use) and scheduled cranberry tabs.      BP goals are  <140/90 mm Hg.This is higher than ACC and AHA recommendations due to goals of care, risk for hypotension and risk of dizziness and falls. Patient is stable and continue without pharmacological invention with routine assessment.      ALLERGIES: Contrast  dye  PROBLEM LIST:  Patient Active Problem List   Diagnosis     CAD (coronary artery disease)     HTN (hypertension)     Cognitive impairment     Senile osteoporosis     Pulmonary nodule     ACP (advance care planning)     Bacteremia     Hip fracture (H)     Anemia due to blood loss, acute     Physical deconditioning     Constipation     Tension headache     Alternating constipation and diarrhea     Late onset Alzheimer's disease without behavioral disturbance     PAST MEDICAL HISTORY:  has a past medical history of Dizziness and giddiness; Essential hypertension, benign; Myocardial infarction; Other and unspecified hyperlipidemia; Palpitations; and Pure hypercholesterolemia.  PAST SURGICAL HISTORY:  has a past surgical history that includes NONSPECIFIC PROCEDURE; NONSPECIFIC PROCEDURE; NONSPECIFIC PROCEDURE (06-7-99); Coronary Angiography Adult Order (2008-[facility]); and Closed reduction, percutaneous pinning hip (Left, 3/16/2016).  FAMILY HISTORY: family history includes Cancer in her brother and mother; HEART DISEASE (age of onset: 83) in her father.  SOCIAL HISTORY:  reports that she has quit smoking. She quit after 10.00 years of use. She has never used smokeless tobacco. She reports that she drinks alcohol. She reports that she does not use illicit drugs.  IMMUNIZATIONS:  Most Recent Immunizations   Administered Date(s) Administered     DTaP, Unspecified 11/20/2014     Influenza (High Dose) 3 valent vaccine 09/26/2018     Influenza (IIV3) PF 10/24/2014     Pneumo Conj 13-V (2010&after) 11/20/2014     TDAP Vaccine (Adacel) 11/20/2014     Zoster vaccine, live 11/04/2014     Above immunizations pulled from [facility]. [facility] and facility records also reconciled. Outstanding information sent to  to update [facility].  Future immunizations are not needed at this point as all recommended immunizations are up to date.   MEDICATIONS:  Current Outpatient Prescriptions   Medication Sig Dispense Refill      amLODIPine (NORVASC) 5 MG tablet Take 1 tablet (5 mg) by mouth daily 90 tablet 3     AMOXICILLIN PO Take 2,000 mg by mouth as needed (1 hour prior to dental apt.)       aspirin 81 MG EC tablet Take 1 tablet (81 mg) by mouth daily 50 tablet 1     belladonna-opium (B&O SUPPRETTES) 16.2-30 MG per suppository Place 30 mg rectally every 6 hours as needed for moderate pain       Bisacodyl 10 MG/30ML ENEM Place 30 mLs (10 mg) rectally as needed 30 mL 3     Cranberry (THERACRAN ONE) 360 MG CAPS Take 1 capsule by mouth 2 times daily       glycerin, adult, 2 g SUPP Suppository Place 1 suppository rectally daily as needed for constipation 15 suppository 0     Lactobacillus Rhamnosus, GG, ([facility] [employer]) capsule TAKE 1 CAPSULE BY MOUTH ONCE DAILY 90 capsule 3     lisinopril (PRINIVIL/ZESTRIL) 10 MG tablet Take 1 tablet (10 mg) by mouth daily 90 tablet 3     loperamide (IMODIUM A-D) 2 MG tablet Take 2 tabs (4 mg) after first loose stool, and then take one tab (2 mg) after each diarrheal stool.  Max of 8 tabs (16 mg) per day. 30 tablet 0     Misc Natural Products (COLON CARE PO) Take 1 capsule by mouth daily       PHENAZOPYRIDINE HCL PO Take 95 mg by mouth daily as needed for irritation        polyethylene glycol (MIRALAX/GLYCOLAX) Packet Take 17 g by mouth daily       senna-docusate (SENOKOT-S;PERICOLACE) 8.6-50 MG per tablet Take 1 tablet by mouth daily as needed        simvastatin (ZOCOR) 40 MG tablet TAKE 1 TABLET(40 MG) BY MOUTH AT BEDTIME 90 tablet 2     VITAMIN D, CHOLECALCIFEROL, PO Take 2,000 Units by mouth daily       [DISCONTINUED] lisinopril (PRINIVIL/ZESTRIL) 5 MG tablet Take 1 tablet (5 mg) by mouth daily In addition to 10mg tablet to total 15mg 90 tablet 3     Medications reviewed:  Medications reconciled to facility chart and changes were made to reflect current medications as identified as above med list. Below are the changes that were made:   Medications stopped since last EPIC medication  reconciliation:   Medications Discontinued During This Encounter   Medication Reason     lisinopril (PRINIVIL/ZESTRIL) 5 MG tablet        Medications started since last [facility] medication reconciliation:  No orders of the defined types were placed in this encounter.    Case Management:  I have reviewed the Assisted Living care plan, current immunizations and preventive care/cancer screening..Future cancer screening is not clinically indicated secondary to age/goals of care Patient's desire to return to the community is not assessible due to cognitive impairment. Current Level of Care is appropriate.    Advance Directive Discussion:    I reviewed the current advanced directives as reflected in EPIC, the POLST and the facility chart, and verified the congruency of orders 10/3/18. I contacted the first party and reviewed with  he has capacity and is first POA and discussed the plan of Care.  I did not due to cognitive impairment review the advance directives with the resident.     Team Discussion:  I communicated with the appropriate disciplines involved with the Plan of Care:   Nursing      Patient Goal:  Patient's goal is pain control and comfort.    Information reviewed:  Medications, vital signs, orders, and nursing notes.    ROS:  Limited secondary to cognitive impairment but today pt reports fine    Exam:  /80  Pulse 79  Temp 99.1  F (37.3  C)  Resp 16  SpO2 99%  GENERAL APPEARANCE:  Alert, in no distress  ENT:  Mouth and posterior oropharynx normal, dry mucous membranes  EYES:  EOM, conjunctivae, lids, pupils and irises normal  NECK:  No adenopathy,masses or thyromegaly  RESP:  respiratory effort and palpation of chest normal, diminished breath sounds throughout  CV:  Palpation and auscultation of heart done , regular rate and rhythm, no murmur, rub, or gallop, peripheral edema trace+ in legs, ankles and feet  ABDOMEN:  normal bowel sounds, soft, nontender  M/S:   Gait and station at baseline with  walker  SKIN:  pale and dry to visible areas  PSYCH:  insight and judgement impaired    Lab/Diagnostic data:   CBC RESULTS:   Recent Labs   Lab Test  06/15/18   1250  05/29/18 2011   WBC  8.0  5.3   RBC  4.60  4.32   HGB  13.8  13.0   HCT  42.4  39.6   MCV  92  92   MCH  30.0  30.1   MCHC  32.5  32.8   RDW  13.5  13.3   PLT  251  239       Last Basic Metabolic Panel:  Recent Labs   Lab Test  09/05/18   0615  06/15/18   1250   NA  143  139   POTASSIUM  4.0  4.3   CHLORIDE  106  105   LEFTY  9.0  9.2   CO2  27  26   BUN  33*  27   CR  1.15*  1.30*   GLC  79  84       Liver Function Studies -   Recent Labs   Lab Test  06/15/18   1250  05/29/18 2011   PROTTOTAL  7.5  6.5*   ALBUMIN  3.5  3.4   BILITOTAL  0.5  0.5   ALKPHOS  81  75   AST  16  17   ALT  15  15       TSH   Date Value Ref Range Status   07/26/2013 1.640 mcU/mL Final   06/27/2012 1.190 mcU/mL Final       ASSESSMENT/PLAN  (K59.09) Other constipation  (primary encounter diagnosis)  Comment: Ongoing  Plan:   -Miralax daily  -Senna S 1 tablet daily prn  -Bisacodyl and Glycerin suppository prn    (I25.10) Coronary artery disease involving native heart without angina pectoris, unspecified vessel or lesion type  (I12.9,  N18.3) Benign hypertension with CKD (chronic kidney disease) stage III (H)  Comment: Chronic and stable  Plan:   -Amlodipine 5 mg po every day  -ASA 81 mg po every day  -Lisinopril 10 mg po every day  -Weights and VS monthly by facility  -Zocor 40 mg po every day    (G30.1,  F02.80) Late onset Alzheimer's disease without behavioral disturbance  Comment: Advanced  Plan:   - and staff for supportive cares    (Z87.440) Personal history of urinary tract infection  Comment: Hx of condition  Plan:   -Cranberry tabs BID  -Monitor for s/s of infection  -Consider estradiol cream     TSH with next set of labs  Discontinue culturelle per 's request       Electronically signed by:  Davey Thornton, RODY CNP          Sincerely,        Davey SCHUSTER  RODY Thornton CNP

## 2018-10-03 NOTE — PROGRESS NOTES
[facility]  Chief Complaint   Patient presents with     Annual Comprehensive Exam Assisted Living       [place] Medical Record Number:  [id number]    HPI:    Aura Broderick is a 89 year old  (9/25/1926), who is being seen today for an annual comprehensive visit at [facility] care  HPI information obtained from: facility chart records and family/first contact  report.  Today's concerns are:    Other constipation  Her  is reporting it has been a few days since last bowel movement. Miralax daily and noted given in facility charts but he is stating she does not take this daily. Senna-S is prn and he is wanting to hold on any changes. She denies pain, abdominal pain, nausea or vomiting.    Coronary artery disease involving native heart without angina pectoris, unspecified vessel or lesion type  hypertension with CKD  Denies lightheadedness or chest pain. History of some orthostatic BP in chart review. Daily Lisinopril, ASA, Statin, Norvasc. Trace edema in legs and no use of Kushal Hose. No updated weights in facility records.     Late onset Alzheimer's disease without behavioral disturbance  Resides in memory care with her . With her decline and need of more assistance they both moved into a unit together. Ambulates with [facility].     Personal history of urinary tract infection  ED 5/29 for condition and again 6/15 for ongoing abdominal/pelvic pain and bladder spasms. She denies hematuria, dysuria today. Has prn phenazopyridine (no recent use) and scheduled cranberry tabs.      BP goals are  <140/90 mm Hg.This is higher than ACC and AHA recommendations due to goals of care, risk for hypotension and risk of dizziness and falls. Patient is stable and continue without pharmacological invention with routine assessment.      ALLERGIES: Contrast dye  PROBLEM LIST:  Patient Active Problem List   Diagnosis     CAD (coronary artery disease)     HTN (hypertension)      Cognitive impairment     Senile osteoporosis     Pulmonary nodule     ACP (advance care planning)     Bacteremia     Hip fracture (H)     Anemia due to blood loss, acute     Physical deconditioning     Constipation     Tension headache     Alternating constipation and diarrhea     Late onset Alzheimer's disease without behavioral disturbance     Benign hypertension with CKD (chronic kidney disease) stage III (H)     PAST MEDICAL HISTORY:  has a past medical history of Dizziness and giddiness; Essential hypertension, benign; Myocardial infarction; Other and unspecified hyperlipidemia; Palpitations; and Pure hypercholesterolemia.  PAST SURGICAL HISTORY:  has a past surgical history that includes NONSPECIFIC PROCEDURE; NONSPECIFIC PROCEDURE; NONSPECIFIC PROCEDURE (06-7-99); Coronary Angiography Adult Order (2008-[facility]); and Closed reduction, percutaneous pinning hip (Left, 3/16/2016).  FAMILY HISTORY: family history includes Cancer in her brother and mother; HEART DISEASE (age of onset: 83) in her father.  SOCIAL HISTORY:  reports that she has quit smoking. She quit after 10.00 years of use. She has never used smokeless tobacco. She reports that she drinks alcohol. She reports that she does not use illicit drugs.  IMMUNIZATIONS:  Most Recent Immunizations   Administered Date(s) Administered     DTaP, Unspecified 11/20/2014     Influenza (High Dose) 3 valent vaccine 09/26/2018     Influenza (IIV3) PF 10/24/2014     Pneumo Conj 13-V (2010&after) 11/20/2014     TDAP Vaccine (Adacel) 11/20/2014     Zoster vaccine, live 11/04/2014     Above immunizations pulled from [facility]. [facility] and facility records also reconciled. Outstanding information sent to  to update [facility].  Future immunizations are not needed at this point as all recommended immunizations are up to date.   MEDICATIONS:  Current Outpatient Prescriptions   Medication Sig Dispense Refill     amLODIPine (NORVASC) 5 MG tablet Take 1  tablet (5 mg) by mouth daily 90 tablet 3     AMOXICILLIN PO Take 2,000 mg by mouth as needed (1 hour prior to dental apt.)       aspirin 81 MG EC tablet Take 1 tablet (81 mg) by mouth daily 50 tablet 1     belladonna-opium (B&O SUPPRETTES) 16.2-30 MG per suppository Place 30 mg rectally every 6 hours as needed for moderate pain       Bisacodyl 10 MG/30ML ENEM Place 30 mLs (10 mg) rectally as needed 30 mL 3     Cranberry (THERACRAN ONE) 360 MG CAPS Take 1 capsule by mouth 2 times daily       glycerin, adult, 2 g SUPP Suppository Place 1 suppository rectally daily as needed for constipation 15 suppository 0     lisinopril (PRINIVIL/ZESTRIL) 10 MG tablet Take 1 tablet (10 mg) by mouth daily 90 tablet 3     loperamide (IMODIUM A-D) 2 MG tablet Take 2 tabs (4 mg) after first loose stool, and then take one tab (2 mg) after each diarrheal stool.  Max of 8 tabs (16 mg) per day. 30 tablet 0     Misc Natural Products (COLON CARE PO) Take 1 capsule by mouth daily       PHENAZOPYRIDINE HCL PO Take 95 mg by mouth daily as needed for irritation        polyethylene glycol (MIRALAX/GLYCOLAX) Packet Take 17 g by mouth daily       senna-docusate (SENOKOT-S;PERICOLACE) 8.6-50 MG per tablet Take 1 tablet by mouth daily as needed        simvastatin (ZOCOR) 40 MG tablet TAKE 1 TABLET(40 MG) BY MOUTH AT BEDTIME 90 tablet 2     VITAMIN D, CHOLECALCIFEROL, PO Take 2,000 Units by mouth daily       [DISCONTINUED] lisinopril (PRINIVIL/ZESTRIL) 5 MG tablet Take 1 tablet (5 mg) by mouth daily In addition to 10mg tablet to total 15mg 90 tablet 3     Medications reviewed:  Medications reconciled to facility chart and changes were made to reflect current medications as identified as above med list. Below are the changes that were made:   Medications stopped since last EPIC medication reconciliation:   Medications Discontinued During This Encounter   Medication Reason     lisinopril (PRINIVIL/ZESTRIL) 5 MG tablet        Medications started since  last EPIC medication reconciliation:  No orders of the defined types were placed in this encounter.    Case Management:  I have reviewed the Assisted Living care plan, current immunizations and preventive care/cancer screening..Future cancer screening is not clinically indicated secondary to age/goals of care Patient's desire to return to the community is not assessible due to cognitive impairment. Current Level of Care is appropriate.    Advance Directive Discussion:    I reviewed the current advanced directives as reflected in EPIC, the POLST and the facility chart, and verified the congruency of orders 10/3/18. I contacted the first party and reviewed with  he has capacity and is first POA and discussed the plan of Care.  I did not due to cognitive impairment review the advance directives with the resident.     Team Discussion:  I communicated with the appropriate disciplines involved with the Plan of Care:   Nursing      Patient Goal:  Patient's goal is pain control and comfort.    Information reviewed:  Medications, vital signs, orders, and nursing notes.    ROS:  Limited secondary to cognitive impairment but today pt reports fine    Exam:  /80  Pulse 79  Temp 99.1  F (37.3  C)  Resp 16  SpO2 99%  GENERAL APPEARANCE:  Alert, in no distress  ENT:  Mouth and posterior oropharynx normal, dry mucous membranes  EYES:  EOM, conjunctivae, lids, pupils and irises normal  NECK:  No adenopathy,masses or thyromegaly  RESP:  respiratory effort and palpation of chest normal, diminished breath sounds throughout  CV:  Palpation and auscultation of heart done , regular rate and rhythm, no murmur, rub, or gallop, peripheral edema trace+ in legs, ankles and feet  ABDOMEN:  normal bowel sounds, soft, nontender  M/S:   Gait and station at baseline with walker  SKIN:  pale and dry to visible areas  PSYCH:  insight and judgement impaired    Lab/Diagnostic data:   CBC RESULTS:   Recent Labs   Lab Test  06/15/18    1250  05/29/18 2011   WBC  8.0  5.3   RBC  4.60  4.32   HGB  13.8  13.0   HCT  42.4  39.6   MCV  92  92   MCH  30.0  30.1   MCHC  32.5  32.8   RDW  13.5  13.3   PLT  251  239       Last Basic Metabolic Panel:  Recent Labs   Lab Test  09/05/18   0615  06/15/18   1250   NA  143  139   POTASSIUM  4.0  4.3   CHLORIDE  106  105   LEFTY  9.0  9.2   CO2  27  26   BUN  33*  27   CR  1.15*  1.30*   GLC  79  84       Liver Function Studies -   Recent Labs   Lab Test  06/15/18   1250  05/29/18 2011   PROTTOTAL  7.5  6.5*   ALBUMIN  3.5  3.4   BILITOTAL  0.5  0.5   ALKPHOS  81  75   AST  16  17   ALT  15  15       TSH   Date Value Ref Range Status   07/26/2013 1.640 mcU/mL Final   06/27/2012 1.190 mcU/mL Final       ASSESSMENT/PLAN  (K59.09) Other constipation  (primary encounter diagnosis)  Comment: Ongoing  Plan:   -Miralax daily  -Senna S 1 tablet daily prn  -Bisacodyl and Glycerin suppository prn    (I25.10) Coronary artery disease involving native heart without angina pectoris, unspecified vessel or lesion type  (I12.9,  N18.3) Benign hypertension with CKD (chronic kidney disease) stage III (H)  Comment: Chronic and stable  Plan:   -Amlodipine 5 mg po every day  -ASA 81 mg po every day  -Lisinopril 10 mg po every day  -Weights and VS monthly by facility  -Zocor 40 mg po every day    (G30.1,  F02.80) Late onset Alzheimer's disease without behavioral disturbance  Comment: Advanced  Plan:   - and staff for supportive cares    (Z87.440) Personal history of urinary tract infection  Comment: Hx of condition  Plan:   -Cranberry tabs BID  -Monitor for s/s of infection  -Consider estradiol cream     TSH with next set of labs  Discontinue culturelle per 's request       Electronically signed by:  Davey Thornton, APRN CNP

## 2018-11-26 NOTE — TELEPHONE ENCOUNTER
"lisinopril (PRINIVIL/ZESTRIL) 10 MG tablet 90 tablet 3 6/16/2017  No   Sig - Route: Take 1 tablet (10 mg) by mouth daily        Last Written Prescription Date:  06/16/2017  Last Fill Quantity: 90,  # refills: 3   Last office visit: 6/14/2018 with prescribing provider:     Future Office Visit:        Requested Prescriptions   Pending Prescriptions Disp Refills     lisinopril (PRINIVIL/ZESTRIL) 10 MG tablet [Pharmacy Med Name: LISINOPRIL TAB 10MG]  98     Sig: TAKE 1 TABLET BY MOUTH ONCE DAILY    ACE Inhibitors (Including Combos) Protocol Failed    11/26/2018  3:39 PM       Failed - Blood pressure under 140/90 in past 12 months    BP Readings from Last 3 Encounters:   10/03/18 140/80   06/27/18 112/63   06/15/18 167/88                Failed - Normal serum creatinine on file in past 12 months    Recent Labs   Lab Test  09/05/18   0615   CR  1.15*            Passed - Recent (12 mo) or future (30 days) visit within the authorizing provider's specialty    Patient had office visit in the last 12 months or has a visit in the next 30 days with authorizing provider or within the authorizing provider's specialty.  See \"Patient Info\" tab in inbasket, or \"Choose Columns\" in Meds & Orders section of the refill encounter.             Passed - Patient is age 18 or older       Passed - No active pregnancy on record       Passed - Normal serum potassium on file in past 12 months    Recent Labs   Lab Test  09/05/18   0615   POTASSIUM  4.0            Passed - No positive pregnancy test in past 12 months          "

## 2018-12-03 NOTE — PROGRESS NOTES
Pneumococcal immunization Injection administered w/o incident.  Patient voiced understanding and agreed to plan of care.   F/u prn. Bernie Pichardo RN December 3, 2018 5:42 PM

## 2018-12-31 NOTE — TELEPHONE ENCOUNTER
"amLODIPine (NORVASC) 5 MG tablet 90 tablet 3 6/16/2017     Last Written Prescription Date:  6/16/17  Last Fill Quantity: 90,  # refills: 3   Last office visit: 6/14/2018 with prescribing provider:  Richard   Future Office Visit:  None  Requested Prescriptions   Pending Prescriptions Disp Refills     amLODIPine (NORVASC) 5 MG tablet [Pharmacy Med Name: AMLODIPINE TAB 5MG]  98     Sig: TAKE 1 TABLET BY MOUTH ONCE DAILY    Calcium Channel Blockers Protocol  Failed - 12/31/2018  1:23 PM       Failed - Blood pressure under 140/90 in past 12 months    BP Readings from Last 3 Encounters:   10/03/18 140/80   06/27/18 112/63   06/15/18 167/88                Failed - Normal serum creatinine on file in past 12 months    Recent Labs   Lab Test 09/05/18  0615   CR 1.15*            Passed - Recent (12 mo) or future (30 days) visit within the authorizing provider's specialty    Patient had office visit in the last 12 months or has a visit in the next 30 days with authorizing provider or within the authorizing provider's specialty.  See \"Patient Info\" tab in inbasket, or \"Choose Columns\" in Meds & Orders section of the refill encounter.             Passed - Patient is age 18 or older       Passed - No active pregnancy on record       Passed - No positive pregnancy test in past 12 months        No flowsheet data found.      "

## 2019-01-01 ENCOUNTER — ASSISTED LIVING VISIT (OUTPATIENT)
Dept: GERIATRICS | Facility: CLINIC | Age: 84
End: 2019-01-01
Payer: MEDICARE

## 2019-01-01 ENCOUNTER — APPOINTMENT (OUTPATIENT)
Dept: MRI IMAGING | Facility: CLINIC | Age: 84
End: 2019-01-01
Payer: MEDICARE

## 2019-01-01 ENCOUNTER — NURSING HOME VISIT (OUTPATIENT)
Dept: GERIATRICS | Facility: CLINIC | Age: 84
End: 2019-01-01
Payer: MEDICARE

## 2019-01-01 ENCOUNTER — ANESTHESIA EVENT (OUTPATIENT)
Dept: SURGERY | Facility: CLINIC | Age: 84
DRG: 470 | End: 2019-01-01
Payer: MEDICARE

## 2019-01-01 ENCOUNTER — HOSPITAL ENCOUNTER (OUTPATIENT)
Facility: CLINIC | Age: 84
Setting detail: OBSERVATION
Discharge: HOME | End: 2019-02-03
Attending: EMERGENCY MEDICINE | Admitting: INTERNAL MEDICINE
Payer: MEDICARE

## 2019-01-01 ENCOUNTER — HOSPITAL LABORATORY (OUTPATIENT)
Dept: OTHER | Facility: CLINIC | Age: 84
End: 2019-01-01

## 2019-01-01 ENCOUNTER — DOCUMENTATION ONLY (OUTPATIENT)
Dept: OTHER | Facility: CLINIC | Age: 84
End: 2019-01-01

## 2019-01-01 ENCOUNTER — APPOINTMENT (OUTPATIENT)
Dept: PHYSICAL THERAPY | Facility: CLINIC | Age: 84
End: 2019-01-01
Payer: MEDICARE

## 2019-01-01 ENCOUNTER — APPOINTMENT (OUTPATIENT)
Dept: GENERAL RADIOLOGY | Facility: CLINIC | Age: 84
DRG: 470 | End: 2019-01-01
Attending: ORTHOPAEDIC SURGERY
Payer: MEDICARE

## 2019-01-01 ENCOUNTER — MEDICAL CORRESPONDENCE (OUTPATIENT)
Dept: HEALTH INFORMATION MANAGEMENT | Facility: CLINIC | Age: 84
End: 2019-01-01

## 2019-01-01 ENCOUNTER — APPOINTMENT (OUTPATIENT)
Dept: ULTRASOUND IMAGING | Facility: CLINIC | Age: 84
End: 2019-01-01
Payer: MEDICARE

## 2019-01-01 ENCOUNTER — TELEPHONE (OUTPATIENT)
Dept: GERIATRICS | Facility: CLINIC | Age: 84
End: 2019-01-01

## 2019-01-01 ENCOUNTER — APPOINTMENT (OUTPATIENT)
Dept: GENERAL RADIOLOGY | Facility: CLINIC | Age: 84
End: 2019-01-01
Payer: MEDICARE

## 2019-01-01 ENCOUNTER — APPOINTMENT (OUTPATIENT)
Dept: CT IMAGING | Facility: CLINIC | Age: 84
End: 2019-01-01
Payer: MEDICARE

## 2019-01-01 ENCOUNTER — APPOINTMENT (OUTPATIENT)
Dept: PHYSICAL THERAPY | Facility: CLINIC | Age: 84
DRG: 470 | End: 2019-01-01
Payer: MEDICARE

## 2019-01-01 ENCOUNTER — ANESTHESIA (OUTPATIENT)
Dept: SURGERY | Facility: CLINIC | Age: 84
DRG: 470 | End: 2019-01-01
Payer: MEDICARE

## 2019-01-01 ENCOUNTER — APPOINTMENT (OUTPATIENT)
Dept: GENERAL RADIOLOGY | Facility: CLINIC | Age: 84
DRG: 470 | End: 2019-01-01
Attending: EMERGENCY MEDICINE
Payer: MEDICARE

## 2019-01-01 ENCOUNTER — TRANSFERRED RECORDS (OUTPATIENT)
Dept: HEALTH INFORMATION MANAGEMENT | Facility: CLINIC | Age: 84
End: 2019-01-01

## 2019-01-01 ENCOUNTER — APPOINTMENT (OUTPATIENT)
Dept: PHYSICAL THERAPY | Facility: CLINIC | Age: 84
DRG: 470 | End: 2019-01-01
Attending: ORTHOPAEDIC SURGERY
Payer: MEDICARE

## 2019-01-01 ENCOUNTER — HOSPITAL ENCOUNTER (INPATIENT)
Facility: CLINIC | Age: 84
LOS: 4 days | Discharge: NURSING, REHAB OR LONG-TERM CARE | DRG: 470 | End: 2019-05-04
Attending: EMERGENCY MEDICINE | Admitting: STUDENT IN AN ORGANIZED HEALTH CARE EDUCATION/TRAINING PROGRAM
Payer: MEDICARE

## 2019-01-01 VITALS
OXYGEN SATURATION: 93 % | HEART RATE: 94 BPM | SYSTOLIC BLOOD PRESSURE: 150 MMHG | TEMPERATURE: 97.9 F | WEIGHT: 123.8 LBS | RESPIRATION RATE: 14 BRPM | DIASTOLIC BLOOD PRESSURE: 65 MMHG | BODY MASS INDEX: 21.25 KG/M2

## 2019-01-01 VITALS
OXYGEN SATURATION: 97 % | BODY MASS INDEX: 20.94 KG/M2 | TEMPERATURE: 98.6 F | SYSTOLIC BLOOD PRESSURE: 140 MMHG | HEART RATE: 70 BPM | DIASTOLIC BLOOD PRESSURE: 82 MMHG | WEIGHT: 122 LBS | RESPIRATION RATE: 18 BRPM

## 2019-01-01 VITALS
SYSTOLIC BLOOD PRESSURE: 122 MMHG | HEART RATE: 84 BPM | DIASTOLIC BLOOD PRESSURE: 60 MMHG | TEMPERATURE: 97.5 F | RESPIRATION RATE: 18 BRPM

## 2019-01-01 VITALS
RESPIRATION RATE: 20 BRPM | DIASTOLIC BLOOD PRESSURE: 83 MMHG | SYSTOLIC BLOOD PRESSURE: 166 MMHG | BODY MASS INDEX: 21.11 KG/M2 | WEIGHT: 123 LBS | OXYGEN SATURATION: 94 % | HEART RATE: 98 BPM | TEMPERATURE: 98.3 F

## 2019-01-01 VITALS
RESPIRATION RATE: 16 BRPM | OXYGEN SATURATION: 98 % | TEMPERATURE: 99.8 F | SYSTOLIC BLOOD PRESSURE: 142 MMHG | HEART RATE: 84 BPM | DIASTOLIC BLOOD PRESSURE: 83 MMHG

## 2019-01-01 VITALS
TEMPERATURE: 98.3 F | BODY MASS INDEX: 21.11 KG/M2 | DIASTOLIC BLOOD PRESSURE: 83 MMHG | SYSTOLIC BLOOD PRESSURE: 166 MMHG | RESPIRATION RATE: 20 BRPM | HEART RATE: 98 BPM | WEIGHT: 123 LBS | OXYGEN SATURATION: 94 %

## 2019-01-01 VITALS
DIASTOLIC BLOOD PRESSURE: 55 MMHG | RESPIRATION RATE: 16 BRPM | OXYGEN SATURATION: 97 % | SYSTOLIC BLOOD PRESSURE: 157 MMHG | WEIGHT: 115 LBS | TEMPERATURE: 97.6 F | HEIGHT: 64 IN | HEART RATE: 57 BPM | BODY MASS INDEX: 19.63 KG/M2

## 2019-01-01 VITALS
TEMPERATURE: 98.4 F | WEIGHT: 123 LBS | HEART RATE: 85 BPM | RESPIRATION RATE: 18 BRPM | OXYGEN SATURATION: 93 % | BODY MASS INDEX: 21.11 KG/M2 | SYSTOLIC BLOOD PRESSURE: 146 MMHG | DIASTOLIC BLOOD PRESSURE: 67 MMHG

## 2019-01-01 DIAGNOSIS — K59.00 CONSTIPATION, UNSPECIFIED CONSTIPATION TYPE: ICD-10-CM

## 2019-01-01 DIAGNOSIS — G30.1 LATE ONSET ALZHEIMER'S DISEASE WITHOUT BEHAVIORAL DISTURBANCE (H): ICD-10-CM

## 2019-01-01 DIAGNOSIS — K59.01 SLOW TRANSIT CONSTIPATION: ICD-10-CM

## 2019-01-01 DIAGNOSIS — D62 ANEMIA DUE TO BLOOD LOSS, ACUTE: ICD-10-CM

## 2019-01-01 DIAGNOSIS — S72.001D CLOSED FRACTURE OF NECK OF RIGHT FEMUR WITH ROUTINE HEALING, SUBSEQUENT ENCOUNTER: Primary | ICD-10-CM

## 2019-01-01 DIAGNOSIS — I10 ESSENTIAL HYPERTENSION: ICD-10-CM

## 2019-01-01 DIAGNOSIS — Z71.89 ADVANCED DIRECTIVES, COUNSELING/DISCUSSION: ICD-10-CM

## 2019-01-01 DIAGNOSIS — N30.00 ACUTE CYSTITIS WITHOUT HEMATURIA: Primary | ICD-10-CM

## 2019-01-01 DIAGNOSIS — Z96.649 S/P HIP HEMIARTHROPLASTY: ICD-10-CM

## 2019-01-01 DIAGNOSIS — M79.604 ACUTE PAIN OF LOWER EXTREMITY, RIGHT: ICD-10-CM

## 2019-01-01 DIAGNOSIS — S72.001A CLOSED FRACTURE OF NECK OF RIGHT FEMUR, INITIAL ENCOUNTER (H): ICD-10-CM

## 2019-01-01 DIAGNOSIS — M79.604 ACUTE PAIN OF LOWER EXTREMITY, RIGHT: Primary | ICD-10-CM

## 2019-01-01 DIAGNOSIS — F02.80 LATE ONSET ALZHEIMER'S DISEASE WITHOUT BEHAVIORAL DISTURBANCE (H): ICD-10-CM

## 2019-01-01 DIAGNOSIS — M15.0 PRIMARY OSTEOARTHRITIS INVOLVING MULTIPLE JOINTS: Primary | ICD-10-CM

## 2019-01-01 DIAGNOSIS — Z87.440 PERSONAL HISTORY OF URINARY TRACT INFECTION: Primary | ICD-10-CM

## 2019-01-01 DIAGNOSIS — R52 PAIN: ICD-10-CM

## 2019-01-01 DIAGNOSIS — M79.651 PAIN OF RIGHT THIGH: ICD-10-CM

## 2019-01-01 DIAGNOSIS — K59.09 OTHER CONSTIPATION: Primary | ICD-10-CM

## 2019-01-01 DIAGNOSIS — Z87.440 PERSONAL HISTORY OF URINARY TRACT INFECTION: ICD-10-CM

## 2019-01-01 DIAGNOSIS — M79.652 BILATERAL THIGH PAIN: ICD-10-CM

## 2019-01-01 DIAGNOSIS — R13.10 DYSPHAGIA, UNSPECIFIED TYPE: ICD-10-CM

## 2019-01-01 DIAGNOSIS — M79.651 BILATERAL THIGH PAIN: ICD-10-CM

## 2019-01-01 DIAGNOSIS — I25.10 CORONARY ARTERY DISEASE INVOLVING NATIVE HEART WITHOUT ANGINA PECTORIS, UNSPECIFIED VESSEL OR LESION TYPE: ICD-10-CM
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
## 2019-01-01 DIAGNOSIS — R53.81 PHYSICAL DECONDITIONING: ICD-10-CM

## 2019-01-01 DIAGNOSIS — S72.002A CLOSED FRACTURE OF NECK OF LEFT FEMUR, INITIAL ENCOUNTER (H): ICD-10-CM

## 2019-01-01 DIAGNOSIS — K59.09 OTHER CONSTIPATION: ICD-10-CM

## 2019-01-01 DIAGNOSIS — N18.30 CKD (CHRONIC KIDNEY DISEASE) STAGE 3, GFR 30-59 ML/MIN (H): ICD-10-CM

## 2019-01-01 DIAGNOSIS — E78.5 HYPERLIPIDEMIA LDL GOAL <70: ICD-10-CM

## 2019-01-01 LAB
ABO + RH BLD: NORMAL
ABO + RH BLD: NORMAL
ALBUMIN SERPL-MCNC: 2.8 G/DL (ref 3.4–5)
ALBUMIN UR-MCNC: 10 MG/DL
ALBUMIN UR-MCNC: NEGATIVE MG/DL
ALP SERPL-CCNC: 57 U/L (ref 40–150)
ALT SERPL W P-5'-P-CCNC: 17 U/L (ref 0–50)
AMORPH CRY #/AREA URNS HPF: ABNORMAL /HPF
ANION GAP SERPL CALCULATED.3IONS-SCNC: 5 MMOL/L (ref 3–14)
ANION GAP SERPL CALCULATED.3IONS-SCNC: 7 MMOL/L (ref 3–14)
ANION GAP SERPL CALCULATED.3IONS-SCNC: 7 MMOL/L (ref 3–14)
ANION GAP SERPL CALCULATED.3IONS-SCNC: 8 MMOL/L (ref 3–14)
ANION GAP SERPL CALCULATED.3IONS-SCNC: 8 MMOL/L (ref 3–14)
ANION GAP SERPL CALCULATED.3IONS-SCNC: NORMAL MMOL/L (ref 6–17)
APPEARANCE UR: ABNORMAL
APPEARANCE UR: ABNORMAL
AST SERPL W P-5'-P-CCNC: 22 U/L (ref 0–45)
BACTERIA #/AREA URNS HPF: ABNORMAL /HPF
BACTERIA #/AREA URNS HPF: ABNORMAL /HPF
BACTERIA SPEC CULT: ABNORMAL
BILIRUB SERPL-MCNC: 0.3 MG/DL (ref 0.2–1.3)
BILIRUB UR QL STRIP: NEGATIVE
BILIRUB UR QL STRIP: NEGATIVE
BLD GP AB SCN SERPL QL: NORMAL
BLOOD BANK CMNT PATIENT-IMP: NORMAL
BUN SERPL-MCNC: 28 MG/DL (ref 7–30)
BUN SERPL-MCNC: 30 MG/DL (ref 7–30)
BUN SERPL-MCNC: 31 MG/DL (ref 7–30)
BUN SERPL-MCNC: 32 MG/DL (ref 7–30)
BUN SERPL-MCNC: 32 MG/DL (ref 7–30)
BUN SERPL-MCNC: NORMAL MG/DL (ref 7–30)
CALCIUM SERPL-MCNC: 8.3 MG/DL (ref 8.5–10.1)
CALCIUM SERPL-MCNC: 8.4 MG/DL (ref 8.5–10.1)
CALCIUM SERPL-MCNC: 8.6 MG/DL (ref 8.5–10.1)
CALCIUM SERPL-MCNC: 8.8 MG/DL (ref 8.5–10.1)
CALCIUM SERPL-MCNC: 9.4 MG/DL (ref 8.5–10.1)
CALCIUM SERPL-MCNC: NORMAL MG/DL (ref 8.5–10.1)
CHLORIDE SERPL-SCNC: 103 MMOL/L (ref 94–109)
CHLORIDE SERPL-SCNC: 107 MMOL/L (ref 94–109)
CHLORIDE SERPL-SCNC: 109 MMOL/L (ref 94–109)
CHLORIDE SERPL-SCNC: 109 MMOL/L (ref 94–109)
CHLORIDE SERPL-SCNC: 110 MMOL/L (ref 94–109)
CHLORIDE SERPL-SCNC: NORMAL MMOL/L (ref 94–109)
CO2 SERPL-SCNC: 22 MMOL/L (ref 20–32)
CO2 SERPL-SCNC: 23 MMOL/L (ref 20–32)
CO2 SERPL-SCNC: 23 MMOL/L (ref 20–32)
CO2 SERPL-SCNC: 27 MMOL/L (ref 20–32)
CO2 SERPL-SCNC: 28 MMOL/L (ref 20–32)
CO2 SERPL-SCNC: NORMAL MMOL/L (ref 20–32)
COLOR UR AUTO: ABNORMAL
COLOR UR AUTO: YELLOW
CREAT SERPL-MCNC: 0.95 MG/DL (ref 0.52–1.04)
CREAT SERPL-MCNC: 1.03 MG/DL (ref 0.52–1.04)
CREAT SERPL-MCNC: 1.05 MG/DL (ref 0.52–1.04)
CREAT SERPL-MCNC: 1.08 MG/DL (ref 0.52–1.04)
CREAT SERPL-MCNC: 1.1 MG/DL (ref 0.52–1.04)
CREAT SERPL-MCNC: 1.11 MG/DL (ref 0.52–1.04)
CREAT SERPL-MCNC: 1.45 MG/DL (ref 0.52–1.04)
CREAT SERPL-MCNC: NORMAL MG/DL (ref 0.52–1.04)
ERYTHROCYTE [DISTWIDTH] IN BLOOD BY AUTOMATED COUNT: 13.1 % (ref 10–15)
ERYTHROCYTE [DISTWIDTH] IN BLOOD BY AUTOMATED COUNT: 14.2 % (ref 10–15)
ERYTHROCYTE [DISTWIDTH] IN BLOOD BY AUTOMATED COUNT: 14.3 % (ref 10–15)
GFR SERPL CREATININE-BSD FRML MDRD: 31 ML/MIN/{1.73_M2}
GFR SERPL CREATININE-BSD FRML MDRD: 43 ML/MIN/{1.73_M2}
GFR SERPL CREATININE-BSD FRML MDRD: 43 ML/MIN/{1.73_M2}
GFR SERPL CREATININE-BSD FRML MDRD: 44 ML/MIN/{1.73_M2}
GFR SERPL CREATININE-BSD FRML MDRD: 46 ML/MIN/{1.73_M2}
GFR SERPL CREATININE-BSD FRML MDRD: 47 ML/MIN/{1.73_M2}
GFR SERPL CREATININE-BSD FRML MDRD: 52 ML/MIN/{1.73_M2}
GFR SERPL CREATININE-BSD FRML MDRD: NORMAL ML/MIN/{1.73_M2}
GLUCOSE SERPL-MCNC: 107 MG/DL (ref 70–99)
GLUCOSE SERPL-MCNC: 109 MG/DL (ref 70–99)
GLUCOSE SERPL-MCNC: 120 MG/DL (ref 70–99)
GLUCOSE SERPL-MCNC: 136 MG/DL (ref 70–99)
GLUCOSE SERPL-MCNC: 81 MG/DL (ref 70–99)
GLUCOSE SERPL-MCNC: 82 MG/DL (ref 70–99)
GLUCOSE SERPL-MCNC: NORMAL MG/DL (ref 70–99)
GLUCOSE UR STRIP-MCNC: NEGATIVE MG/DL
GLUCOSE UR STRIP-MCNC: NEGATIVE MG/DL
HCT VFR BLD AUTO: 38.3 % (ref 35–47)
HCT VFR BLD AUTO: 39.3 % (ref 35–47)
HCT VFR BLD AUTO: 39.4 % (ref 35–47)
HGB BLD-MCNC: 10.9 G/DL (ref 11.7–15.7)
HGB BLD-MCNC: 12.5 G/DL (ref 11.7–15.7)
HGB BLD-MCNC: 12.8 G/DL (ref 11.7–15.7)
HGB BLD-MCNC: 13.1 G/DL (ref 11.7–15.7)
HGB BLD-MCNC: 7.9 G/DL (ref 11.7–15.7)
HGB BLD-MCNC: 8.4 G/DL (ref 11.7–15.7)
HGB UR QL STRIP: NEGATIVE
HGB UR QL STRIP: NEGATIVE
INR PPP: 0.98 (ref 0.86–1.14)
INTERPRETATION ECG - MUSE: NORMAL
KETONES UR STRIP-MCNC: NEGATIVE MG/DL
KETONES UR STRIP-MCNC: NEGATIVE MG/DL
LEUKOCYTE ESTERASE UR QL STRIP: ABNORMAL
LEUKOCYTE ESTERASE UR QL STRIP: ABNORMAL
Lab: ABNORMAL
Lab: ABNORMAL
MCH RBC QN AUTO: 30.3 PG (ref 26.5–33)
MCH RBC QN AUTO: 31 PG (ref 26.5–33)
MCH RBC QN AUTO: 31.1 PG (ref 26.5–33)
MCHC RBC AUTO-ENTMCNC: 32.5 G/DL (ref 31.5–36.5)
MCHC RBC AUTO-ENTMCNC: 32.6 G/DL (ref 31.5–36.5)
MCHC RBC AUTO-ENTMCNC: 33.3 G/DL (ref 31.5–36.5)
MCV RBC AUTO: 91 FL (ref 78–100)
MCV RBC AUTO: 95 FL (ref 78–100)
MCV RBC AUTO: 96 FL (ref 78–100)
NITRATE UR QL: NEGATIVE
NITRATE UR QL: POSITIVE
PH UR STRIP: 6 PH (ref 5–7)
PH UR STRIP: 6 PH (ref 5–7)
PLATELET # BLD AUTO: 185 10E9/L (ref 150–450)
PLATELET # BLD AUTO: 224 10E9/L (ref 150–450)
PLATELET # BLD AUTO: 246 10E9/L (ref 150–450)
PLATELET # BLD AUTO: 265 10E9/L (ref 150–450)
POTASSIUM SERPL-SCNC: 3.8 MMOL/L (ref 3.4–5.3)
POTASSIUM SERPL-SCNC: 4.1 MMOL/L (ref 3.4–5.3)
POTASSIUM SERPL-SCNC: 4.3 MMOL/L (ref 3.4–5.3)
POTASSIUM SERPL-SCNC: 4.6 MMOL/L (ref 3.4–5.3)
POTASSIUM SERPL-SCNC: 4.7 MMOL/L (ref 3.4–5.3)
POTASSIUM SERPL-SCNC: NORMAL MMOL/L (ref 3.4–5.3)
PROT SERPL-MCNC: 5.9 G/DL (ref 6.8–8.8)
RBC # BLD AUTO: 4.03 10E12/L (ref 3.8–5.2)
RBC # BLD AUTO: 4.11 10E12/L (ref 3.8–5.2)
RBC # BLD AUTO: 4.33 10E12/L (ref 3.8–5.2)
RBC #/AREA URNS AUTO: 7 /HPF (ref 0–2)
RBC #/AREA URNS AUTO: <1 /HPF (ref 0–2)
SODIUM SERPL-SCNC: 139 MMOL/L (ref 133–144)
SODIUM SERPL-SCNC: 140 MMOL/L (ref 133–144)
SODIUM SERPL-SCNC: NORMAL MMOL/L (ref 133–144)
SOURCE: ABNORMAL
SOURCE: ABNORMAL
SP GR UR STRIP: 1.01 (ref 1–1.03)
SP GR UR STRIP: 1.02 (ref 1–1.03)
SPECIMEN EXP DATE BLD: NORMAL
SPECIMEN SOURCE: ABNORMAL
SPECIMEN SOURCE: ABNORMAL
SQUAMOUS #/AREA URNS AUTO: 1 /HPF (ref 0–1)
SQUAMOUS #/AREA URNS AUTO: <1 /HPF (ref 0–1)
UROBILINOGEN UR STRIP-MCNC: NORMAL MG/DL (ref 0–2)
UROBILINOGEN UR STRIP-MCNC: NORMAL MG/DL (ref 0–2)
WBC # BLD AUTO: 10 10E9/L (ref 4–11)
WBC # BLD AUTO: 4.4 10E9/L (ref 4–11)
WBC # BLD AUTO: 6.5 10E9/L (ref 4–11)
WBC #/AREA URNS AUTO: 141 /HPF (ref 0–5)
WBC #/AREA URNS AUTO: 5 /HPF (ref 0–5)
WBC CLUMPS #/AREA URNS HPF: PRESENT /HPF

## 2019-01-01 PROCEDURE — 25000128 H RX IP 250 OP 636: Performed by: PHYSICIAN ASSISTANT

## 2019-01-01 PROCEDURE — 25000132 ZZH RX MED GY IP 250 OP 250 PS 637: Mod: GY | Performed by: INTERNAL MEDICINE

## 2019-01-01 PROCEDURE — 37000009 ZZH ANESTHESIA TECHNICAL FEE, EACH ADDTL 15 MIN: Performed by: ORTHOPAEDIC SURGERY

## 2019-01-01 PROCEDURE — 40000169 ZZH STATISTIC PRE-PROCEDURE ASSESSMENT I: Performed by: ORTHOPAEDIC SURGERY

## 2019-01-01 PROCEDURE — 36415 COLL VENOUS BLD VENIPUNCTURE: CPT | Performed by: EMERGENCY MEDICINE

## 2019-01-01 PROCEDURE — 73552 X-RAY EXAM OF FEMUR 2/>: CPT | Mod: 50

## 2019-01-01 PROCEDURE — 85018 HEMOGLOBIN: CPT | Performed by: ORTHOPAEDIC SURGERY

## 2019-01-01 PROCEDURE — 36415 COLL VENOUS BLD VENIPUNCTURE: CPT | Performed by: PHYSICIAN ASSISTANT

## 2019-01-01 PROCEDURE — 25800030 ZZH RX IP 258 OP 636: Performed by: ORTHOPAEDIC SURGERY

## 2019-01-01 PROCEDURE — 97110 THERAPEUTIC EXERCISES: CPT | Mod: GP

## 2019-01-01 PROCEDURE — 12000000 ZZH R&B MED SURG/OB

## 2019-01-01 PROCEDURE — 85027 COMPLETE CBC AUTOMATED: CPT | Performed by: STUDENT IN AN ORGANIZED HEALTH CARE EDUCATION/TRAINING PROGRAM

## 2019-01-01 PROCEDURE — 85610 PROTHROMBIN TIME: CPT | Performed by: STUDENT IN AN ORGANIZED HEALTH CARE EDUCATION/TRAINING PROGRAM

## 2019-01-01 PROCEDURE — 80048 BASIC METABOLIC PNL TOTAL CA: CPT | Performed by: STUDENT IN AN ORGANIZED HEALTH CARE EDUCATION/TRAINING PROGRAM

## 2019-01-01 PROCEDURE — 86901 BLOOD TYPING SEROLOGIC RH(D): CPT | Performed by: PHYSICIAN ASSISTANT

## 2019-01-01 PROCEDURE — 36415 COLL VENOUS BLD VENIPUNCTURE: CPT | Performed by: INTERNAL MEDICINE

## 2019-01-01 PROCEDURE — 25800030 ZZH RX IP 258 OP 636: Performed by: INTERNAL MEDICINE

## 2019-01-01 PROCEDURE — 25000128 H RX IP 250 OP 636: Performed by: INTERNAL MEDICINE

## 2019-01-01 PROCEDURE — 37000008 ZZH ANESTHESIA TECHNICAL FEE, 1ST 30 MIN: Performed by: ORTHOPAEDIC SURGERY

## 2019-01-01 PROCEDURE — 99232 SBSQ HOSP IP/OBS MODERATE 35: CPT | Performed by: INTERNAL MEDICINE

## 2019-01-01 PROCEDURE — 25000132 ZZH RX MED GY IP 250 OP 250 PS 637: Performed by: STUDENT IN AN ORGANIZED HEALTH CARE EDUCATION/TRAINING PROGRAM

## 2019-01-01 PROCEDURE — 72148 MRI LUMBAR SPINE W/O DYE: CPT

## 2019-01-01 PROCEDURE — A9270 NON-COVERED ITEM OR SERVICE: HCPCS | Performed by: INTERNAL MEDICINE

## 2019-01-01 PROCEDURE — G0378 HOSPITAL OBSERVATION PER HR: HCPCS

## 2019-01-01 PROCEDURE — 25000566 ZZH SEVOFLURANE, EA 15 MIN: Performed by: ORTHOPAEDIC SURGERY

## 2019-01-01 PROCEDURE — 85027 COMPLETE CBC AUTOMATED: CPT | Performed by: EMERGENCY MEDICINE

## 2019-01-01 PROCEDURE — 25000128 H RX IP 250 OP 636: Performed by: NURSE ANESTHETIST, CERTIFIED REGISTERED

## 2019-01-01 PROCEDURE — 25000132 ZZH RX MED GY IP 250 OP 250 PS 637: Mod: GY | Performed by: PHYSICIAN ASSISTANT

## 2019-01-01 PROCEDURE — A9270 NON-COVERED ITEM OR SERVICE: HCPCS | Performed by: STUDENT IN AN ORGANIZED HEALTH CARE EDUCATION/TRAINING PROGRAM

## 2019-01-01 PROCEDURE — 93005 ELECTROCARDIOGRAM TRACING: CPT

## 2019-01-01 PROCEDURE — 97530 THERAPEUTIC ACTIVITIES: CPT | Mod: GP

## 2019-01-01 PROCEDURE — 99207 ZZC CDG-CODE INCORRECT PER BILLING BASED ON TIME: CPT | Performed by: NURSE PRACTITIONER

## 2019-01-01 PROCEDURE — 40000985 XR PELVIS AD HIP PORTABLE RIGHT 1 VIEW

## 2019-01-01 PROCEDURE — A9270 NON-COVERED ITEM OR SERVICE: HCPCS | Mod: GY | Performed by: PHYSICIAN ASSISTANT

## 2019-01-01 PROCEDURE — 87186 SC STD MICRODIL/AGAR DIL: CPT | Performed by: INTERNAL MEDICINE

## 2019-01-01 PROCEDURE — A9270 NON-COVERED ITEM OR SERVICE: HCPCS | Mod: GY | Performed by: INTERNAL MEDICINE

## 2019-01-01 PROCEDURE — 25000132 ZZH RX MED GY IP 250 OP 250 PS 637: Performed by: INTERNAL MEDICINE

## 2019-01-01 PROCEDURE — 82565 ASSAY OF CREATININE: CPT | Performed by: ORTHOPAEDIC SURGERY

## 2019-01-01 PROCEDURE — 99305 1ST NF CARE MODERATE MDM 35: CPT | Mod: AI | Performed by: INTERNAL MEDICINE

## 2019-01-01 PROCEDURE — 99309 SBSQ NF CARE MODERATE MDM 30: CPT | Performed by: NURSE PRACTITIONER

## 2019-01-01 PROCEDURE — 99223 1ST HOSP IP/OBS HIGH 75: CPT | Mod: AI | Performed by: STUDENT IN AN ORGANIZED HEALTH CARE EDUCATION/TRAINING PROGRAM

## 2019-01-01 PROCEDURE — 99217 ZZC OBSERVATION CARE DISCHARGE: CPT | Performed by: PHYSICIAN ASSISTANT

## 2019-01-01 PROCEDURE — A9270 NON-COVERED ITEM OR SERVICE: HCPCS | Performed by: PHYSICIAN ASSISTANT

## 2019-01-01 PROCEDURE — 85049 AUTOMATED PLATELET COUNT: CPT | Performed by: ORTHOPAEDIC SURGERY

## 2019-01-01 PROCEDURE — 80048 BASIC METABOLIC PNL TOTAL CA: CPT | Performed by: EMERGENCY MEDICINE

## 2019-01-01 PROCEDURE — A9270 NON-COVERED ITEM OR SERVICE: HCPCS | Performed by: ORTHOPAEDIC SURGERY

## 2019-01-01 PROCEDURE — 81001 URINALYSIS AUTO W/SCOPE: CPT | Performed by: INTERNAL MEDICINE

## 2019-01-01 PROCEDURE — 25800030 ZZH RX IP 258 OP 636: Performed by: ANESTHESIOLOGY

## 2019-01-01 PROCEDURE — 25000132 ZZH RX MED GY IP 250 OP 250 PS 637: Performed by: PHYSICIAN ASSISTANT

## 2019-01-01 PROCEDURE — 36415 COLL VENOUS BLD VENIPUNCTURE: CPT | Performed by: STUDENT IN AN ORGANIZED HEALTH CARE EDUCATION/TRAINING PROGRAM

## 2019-01-01 PROCEDURE — 25000132 ZZH RX MED GY IP 250 OP 250 PS 637: Performed by: ORTHOPAEDIC SURGERY

## 2019-01-01 PROCEDURE — 99207 ZZC MOONLIGHTING INDICATOR: CPT | Performed by: PHYSICIAN ASSISTANT

## 2019-01-01 PROCEDURE — 80048 BASIC METABOLIC PNL TOTAL CA: CPT | Performed by: INTERNAL MEDICINE

## 2019-01-01 PROCEDURE — 25000128 H RX IP 250 OP 636: Performed by: ANESTHESIOLOGY

## 2019-01-01 PROCEDURE — 25800030 ZZH RX IP 258 OP 636: Performed by: STUDENT IN AN ORGANIZED HEALTH CARE EDUCATION/TRAINING PROGRAM

## 2019-01-01 PROCEDURE — 36415 COLL VENOUS BLD VENIPUNCTURE: CPT | Performed by: ORTHOPAEDIC SURGERY

## 2019-01-01 PROCEDURE — 93971 EXTREMITY STUDY: CPT | Mod: RT

## 2019-01-01 PROCEDURE — 25000128 H RX IP 250 OP 636: Performed by: ORTHOPAEDIC SURGERY

## 2019-01-01 PROCEDURE — 86900 BLOOD TYPING SEROLOGIC ABO: CPT | Performed by: PHYSICIAN ASSISTANT

## 2019-01-01 PROCEDURE — 99285 EMERGENCY DEPT VISIT HI MDM: CPT | Mod: 25

## 2019-01-01 PROCEDURE — 25000128 H RX IP 250 OP 636: Performed by: STUDENT IN AN ORGANIZED HEALTH CARE EDUCATION/TRAINING PROGRAM

## 2019-01-01 PROCEDURE — 85018 HEMOGLOBIN: CPT | Performed by: INTERNAL MEDICINE

## 2019-01-01 PROCEDURE — 87088 URINE BACTERIA CULTURE: CPT | Performed by: INTERNAL MEDICINE

## 2019-01-01 PROCEDURE — 87086 URINE CULTURE/COLONY COUNT: CPT | Performed by: INTERNAL MEDICINE

## 2019-01-01 PROCEDURE — 99310 SBSQ NF CARE HIGH MDM 45: CPT | Mod: GV | Performed by: NURSE PRACTITIONER

## 2019-01-01 PROCEDURE — C1776 JOINT DEVICE (IMPLANTABLE): HCPCS | Performed by: ORTHOPAEDIC SURGERY

## 2019-01-01 PROCEDURE — 25800025 ZZH RX 258: Performed by: ORTHOPAEDIC SURGERY

## 2019-01-01 PROCEDURE — 71000013 ZZH RECOVERY PHASE 1 LEVEL 1 EA ADDTL HR: Performed by: ORTHOPAEDIC SURGERY

## 2019-01-01 PROCEDURE — 73502 X-RAY EXAM HIP UNI 2-3 VIEWS: CPT

## 2019-01-01 PROCEDURE — 36000093 ZZH SURGERY LEVEL 4 1ST 30 MIN: Performed by: ORTHOPAEDIC SURGERY

## 2019-01-01 PROCEDURE — 71000012 ZZH RECOVERY PHASE 1 LEVEL 1 FIRST HR: Performed by: ORTHOPAEDIC SURGERY

## 2019-01-01 PROCEDURE — 25000125 ZZHC RX 250: Performed by: REGISTERED NURSE

## 2019-01-01 PROCEDURE — 82947 ASSAY GLUCOSE BLOOD QUANT: CPT | Performed by: ORTHOPAEDIC SURGERY

## 2019-01-01 PROCEDURE — 99220 ZZC INITIAL OBSERVATION CARE,LEVL III: CPT | Performed by: INTERNAL MEDICINE

## 2019-01-01 PROCEDURE — 97116 GAIT TRAINING THERAPY: CPT | Mod: GP | Performed by: PHYSICAL THERAPIST

## 2019-01-01 PROCEDURE — 27210794 ZZH OR GENERAL SUPPLY STERILE: Performed by: ORTHOPAEDIC SURGERY

## 2019-01-01 PROCEDURE — 99238 HOSP IP/OBS DSCHRG MGMT 30/<: CPT | Performed by: INTERNAL MEDICINE

## 2019-01-01 PROCEDURE — 0SRR01A REPLACEMENT OF RIGHT HIP JOINT, FEMORAL SURFACE WITH METAL SYNTHETIC SUBSTITUTE, UNCEMENTED, OPEN APPROACH: ICD-10-PCS | Performed by: ORTHOPAEDIC SURGERY

## 2019-01-01 PROCEDURE — 99285 EMERGENCY DEPT VISIT HI MDM: CPT

## 2019-01-01 PROCEDURE — 40000193 ZZH STATISTIC PT WARD VISIT: Performed by: PHYSICAL THERAPIST

## 2019-01-01 PROCEDURE — 97530 THERAPEUTIC ACTIVITIES: CPT | Mod: GP | Performed by: PHYSICAL THERAPIST

## 2019-01-01 PROCEDURE — 73700 CT LOWER EXTREMITY W/O DYE: CPT | Mod: RT

## 2019-01-01 PROCEDURE — 40000894 ZZH STATISTIC OT IP EVAL DEFER

## 2019-01-01 PROCEDURE — 25000125 ZZHC RX 250: Performed by: NURSE ANESTHETIST, CERTIFIED REGISTERED

## 2019-01-01 PROCEDURE — 97161 PT EVAL LOW COMPLEX 20 MIN: CPT | Mod: GP | Performed by: PHYSICAL THERAPIST

## 2019-01-01 PROCEDURE — 97162 PT EVAL MOD COMPLEX 30 MIN: CPT | Mod: GP

## 2019-01-01 PROCEDURE — 97110 THERAPEUTIC EXERCISES: CPT | Mod: GP | Performed by: PHYSICAL THERAPIST

## 2019-01-01 PROCEDURE — 86850 RBC ANTIBODY SCREEN: CPT | Performed by: PHYSICIAN ASSISTANT

## 2019-01-01 PROCEDURE — 36000063 ZZH SURGERY LEVEL 4 EA 15 ADDTL MIN: Performed by: ORTHOPAEDIC SURGERY

## 2019-01-01 PROCEDURE — 25000128 H RX IP 250 OP 636: Performed by: REGISTERED NURSE

## 2019-01-01 PROCEDURE — 74019 RADEX ABDOMEN 2 VIEWS: CPT

## 2019-01-01 PROCEDURE — 99207 ZZC CDG-MDM COMPONENT: MEETS LOW - DOWN CODED: CPT | Performed by: INTERNAL MEDICINE

## 2019-01-01 PROCEDURE — 25000125 ZZHC RX 250: Performed by: ORTHOPAEDIC SURGERY

## 2019-01-01 DEVICE — IMP HEAD FEMORAL SNR COBALT 28MM -3 71302803: Type: IMPLANTABLE DEVICE | Site: HIP | Status: FUNCTIONAL

## 2019-01-01 DEVICE — IMPLANTABLE DEVICE: Type: IMPLANTABLE DEVICE | Site: HIP | Status: FUNCTIONAL

## 2019-01-01 RX ORDER — ONDANSETRON 2 MG/ML
4 INJECTION INTRAMUSCULAR; INTRAVENOUS EVERY 6 HOURS PRN
Status: DISCONTINUED | OUTPATIENT
Start: 2019-01-01 | End: 2019-01-01 | Stop reason: HOSPADM

## 2019-01-01 RX ORDER — ASPIRIN 81 MG/1
81 TABLET ORAL DAILY
Status: DISCONTINUED | OUTPATIENT
Start: 2019-01-01 | End: 2019-01-01 | Stop reason: HOSPADM

## 2019-01-01 RX ORDER — ASPIRIN 81 MG/1
TABLET ORAL
Qty: 120 TABLET | Refills: 98 | Status: ON HOLD | OUTPATIENT
Start: 2019-01-01 | End: 2019-01-01

## 2019-01-01 RX ORDER — ACETAMINOPHEN 650 MG/1
650 SUPPOSITORY RECTAL EVERY 4 HOURS PRN
Status: DISCONTINUED | OUTPATIENT
Start: 2019-01-01 | End: 2019-01-01 | Stop reason: HOSPADM

## 2019-01-01 RX ORDER — SODIUM CHLORIDE 9 MG/ML
INJECTION, SOLUTION INTRAVENOUS CONTINUOUS
Status: DISCONTINUED | OUTPATIENT
Start: 2019-01-01 | End: 2019-01-01

## 2019-01-01 RX ORDER — SULFAMETHOXAZOLE/TRIMETHOPRIM 800-160 MG
1 TABLET ORAL 2 TIMES DAILY
COMMUNITY
Start: 2019-01-01 | End: 2019-01-01

## 2019-01-01 RX ORDER — NEOSTIGMINE METHYLSULFATE 1 MG/ML
VIAL (ML) INJECTION PRN
Status: DISCONTINUED | OUTPATIENT
Start: 2019-01-01 | End: 2019-01-01

## 2019-01-01 RX ORDER — LIDOCAINE 40 MG/G
CREAM TOPICAL
Status: DISCONTINUED | OUTPATIENT
Start: 2019-01-01 | End: 2019-01-01 | Stop reason: HOSPADM

## 2019-01-01 RX ORDER — ACETAMINOPHEN 500 MG
1000 TABLET ORAL EVERY 8 HOURS PRN
DISCHARGE
Start: 2019-01-01 | End: 2019-01-01

## 2019-01-01 RX ORDER — DIPHENHYDRAMINE HYDROCHLORIDE 50 MG/ML
50 INJECTION INTRAMUSCULAR; INTRAVENOUS
Status: DISCONTINUED | OUTPATIENT
Start: 2019-01-01 | End: 2019-01-01 | Stop reason: HOSPADM

## 2019-01-01 RX ORDER — CEFAZOLIN SODIUM 1 G/3ML
1 INJECTION, POWDER, FOR SOLUTION INTRAMUSCULAR; INTRAVENOUS SEE ADMIN INSTRUCTIONS
Status: DISCONTINUED | OUTPATIENT
Start: 2019-01-01 | End: 2019-01-01 | Stop reason: HOSPADM

## 2019-01-01 RX ORDER — OXYCODONE HYDROCHLORIDE 5 MG/1
5 CAPSULE ORAL EVERY 4 HOURS PRN
Status: ON HOLD | COMMUNITY
End: 2019-01-01

## 2019-01-01 RX ORDER — HYDROMORPHONE HYDROCHLORIDE 1 MG/ML
.3-.5 INJECTION, SOLUTION INTRAMUSCULAR; INTRAVENOUS; SUBCUTANEOUS
Status: DISCONTINUED | OUTPATIENT
Start: 2019-01-01 | End: 2019-01-01

## 2019-01-01 RX ORDER — ONDANSETRON 2 MG/ML
4 INJECTION INTRAMUSCULAR; INTRAVENOUS EVERY 6 HOURS PRN
Status: DISCONTINUED | OUTPATIENT
Start: 2019-01-01 | End: 2019-01-01

## 2019-01-01 RX ORDER — FENTANYL CITRATE 50 UG/ML
25-50 INJECTION, SOLUTION INTRAMUSCULAR; INTRAVENOUS
Status: DISCONTINUED | OUTPATIENT
Start: 2019-01-01 | End: 2019-01-01 | Stop reason: HOSPADM

## 2019-01-01 RX ORDER — PROPOFOL 10 MG/ML
INJECTION, EMULSION INTRAVENOUS PRN
Status: DISCONTINUED | OUTPATIENT
Start: 2019-01-01 | End: 2019-01-01

## 2019-01-01 RX ORDER — BISACODYL 10 MG
10 SUPPOSITORY, RECTAL RECTAL DAILY PRN
Status: DISCONTINUED | OUTPATIENT
Start: 2019-01-01 | End: 2019-01-01 | Stop reason: HOSPADM

## 2019-01-01 RX ORDER — BENZTROPINE MESYLATE 1 MG/1
1-2 TABLET ORAL 3 TIMES DAILY PRN
Status: DISCONTINUED | OUTPATIENT
Start: 2019-01-01 | End: 2019-01-01 | Stop reason: HOSPADM

## 2019-01-01 RX ORDER — GLYCOPYRROLATE 0.2 MG/ML
INJECTION, SOLUTION INTRAMUSCULAR; INTRAVENOUS PRN
Status: DISCONTINUED | OUTPATIENT
Start: 2019-01-01 | End: 2019-01-01

## 2019-01-01 RX ORDER — LISINOPRIL 10 MG/1
10 TABLET ORAL DAILY
Status: DISCONTINUED | OUTPATIENT
Start: 2019-01-01 | End: 2019-01-01 | Stop reason: HOSPADM

## 2019-01-01 RX ORDER — SODIUM CHLORIDE 9 MG/ML
INJECTION, SOLUTION INTRAVENOUS CONTINUOUS
Status: DISCONTINUED | OUTPATIENT
Start: 2019-01-01 | End: 2019-01-01 | Stop reason: HOSPADM

## 2019-01-01 RX ORDER — POLYETHYLENE GLYCOL 3350 17 G/17G
17 POWDER, FOR SOLUTION ORAL DAILY
Status: DISCONTINUED | OUTPATIENT
Start: 2019-01-01 | End: 2019-01-01 | Stop reason: HOSPADM

## 2019-01-01 RX ORDER — DOCUSATE SODIUM AND SENNOSIDES 50; 8.6 MG/1; MG/1
TABLET ORAL
Qty: 60 TABLET | Refills: 98 | Status: SHIPPED | OUTPATIENT
Start: 2019-01-01 | End: 2019-01-01

## 2019-01-01 RX ORDER — MORPHINE SULFATE 30 MG/1
2.5 TABLET ORAL EVERY 4 HOURS PRN
COMMUNITY

## 2019-01-01 RX ORDER — OXYCODONE HYDROCHLORIDE 5 MG/1
5 TABLET ORAL EVERY 6 HOURS PRN
Qty: 20 TABLET | Refills: 0 | Status: SHIPPED | OUTPATIENT
Start: 2019-01-01 | End: 2019-01-01

## 2019-01-01 RX ORDER — BISACODYL 5 MG
5 TABLET, DELAYED RELEASE (ENTERIC COATED) ORAL DAILY PRN
Status: DISCONTINUED | OUTPATIENT
Start: 2019-01-01 | End: 2019-01-01 | Stop reason: HOSPADM

## 2019-01-01 RX ORDER — OXYCODONE HYDROCHLORIDE 5 MG/1
2.5-5 TABLET ORAL EVERY 4 HOURS PRN
Qty: 30 TABLET | Refills: 0 | Status: SHIPPED | OUTPATIENT
Start: 2019-01-01 | End: 2019-01-01 | Stop reason: ALTCHOICE

## 2019-01-01 RX ORDER — METHYLPREDNISOLONE SODIUM SUCCINATE 125 MG/2ML
125 INJECTION, POWDER, LYOPHILIZED, FOR SOLUTION INTRAMUSCULAR; INTRAVENOUS
Status: DISCONTINUED | OUTPATIENT
Start: 2019-01-01 | End: 2019-01-01 | Stop reason: HOSPADM

## 2019-01-01 RX ORDER — NALOXONE HYDROCHLORIDE 0.4 MG/ML
.1-.4 INJECTION, SOLUTION INTRAMUSCULAR; INTRAVENOUS; SUBCUTANEOUS
Status: DISCONTINUED | OUTPATIENT
Start: 2019-01-01 | End: 2019-01-01

## 2019-01-01 RX ORDER — NALOXONE HYDROCHLORIDE 0.4 MG/ML
.1-.4 INJECTION, SOLUTION INTRAMUSCULAR; INTRAVENOUS; SUBCUTANEOUS
Status: DISCONTINUED | OUTPATIENT
Start: 2019-01-01 | End: 2019-01-01 | Stop reason: HOSPADM

## 2019-01-01 RX ORDER — AMLODIPINE BESYLATE 5 MG/1
5 TABLET ORAL DAILY
Status: DISCONTINUED | OUTPATIENT
Start: 2019-01-01 | End: 2019-01-01 | Stop reason: HOSPADM

## 2019-01-01 RX ORDER — OXYCODONE HYDROCHLORIDE 5 MG/1
5-10 TABLET ORAL EVERY 4 HOURS PRN
Status: DISCONTINUED | OUTPATIENT
Start: 2019-01-01 | End: 2019-01-01

## 2019-01-01 RX ORDER — ACETAMINOPHEN 325 MG/1
650 TABLET ORAL EVERY 6 HOURS PRN
Status: DISCONTINUED | OUTPATIENT
Start: 2019-01-01 | End: 2019-01-01

## 2019-01-01 RX ORDER — ONDANSETRON 4 MG/1
4 TABLET, ORALLY DISINTEGRATING ORAL EVERY 30 MIN PRN
Status: DISCONTINUED | OUTPATIENT
Start: 2019-01-01 | End: 2019-01-01 | Stop reason: HOSPADM

## 2019-01-01 RX ORDER — LIDOCAINE 246 MG/1
PATCH TOPICAL
Qty: 30 PATCH | Refills: 97 | Status: SHIPPED | OUTPATIENT
Start: 2019-01-01 | End: 2019-01-01 | Stop reason: ALTCHOICE

## 2019-01-01 RX ORDER — POLYETHYLENE GLYCOL 3350 17 G/17G
17 POWDER, FOR SOLUTION ORAL 2 TIMES DAILY PRN
Status: DISCONTINUED | OUTPATIENT
Start: 2019-01-01 | End: 2019-01-01 | Stop reason: HOSPADM

## 2019-01-01 RX ORDER — OXYCODONE HYDROCHLORIDE 5 MG/1
5 TABLET ORAL
Status: DISCONTINUED | OUTPATIENT
Start: 2019-01-01 | End: 2019-01-01 | Stop reason: HOSPADM

## 2019-01-01 RX ORDER — BISACODYL 5 MG
15 TABLET, DELAYED RELEASE (ENTERIC COATED) ORAL DAILY PRN
Status: DISCONTINUED | OUTPATIENT
Start: 2019-01-01 | End: 2019-01-01 | Stop reason: HOSPADM

## 2019-01-01 RX ORDER — AMOXICILLIN 250 MG
1 CAPSULE ORAL 2 TIMES DAILY
Status: DISCONTINUED | OUTPATIENT
Start: 2019-01-01 | End: 2019-01-01 | Stop reason: HOSPADM

## 2019-01-01 RX ORDER — HYDROMORPHONE HYDROCHLORIDE 1 MG/ML
0.2 INJECTION, SOLUTION INTRAMUSCULAR; INTRAVENOUS; SUBCUTANEOUS
Status: DISCONTINUED | OUTPATIENT
Start: 2019-01-01 | End: 2019-01-01 | Stop reason: HOSPADM

## 2019-01-01 RX ORDER — SODIUM CHLORIDE, SODIUM LACTATE, POTASSIUM CHLORIDE, CALCIUM CHLORIDE 600; 310; 30; 20 MG/100ML; MG/100ML; MG/100ML; MG/100ML
INJECTION, SOLUTION INTRAVENOUS CONTINUOUS
Status: DISCONTINUED | OUTPATIENT
Start: 2019-01-01 | End: 2019-01-01 | Stop reason: HOSPADM

## 2019-01-01 RX ORDER — AMOXICILLIN 250 MG
1 CAPSULE ORAL DAILY PRN
COMMUNITY
End: 2019-01-01 | Stop reason: DRUGHIGH

## 2019-01-01 RX ORDER — LISINOPRIL 10 MG/1
10 TABLET ORAL DAILY
Status: DISCONTINUED | OUTPATIENT
Start: 2019-01-01 | End: 2019-01-01

## 2019-01-01 RX ORDER — BISACODYL 5 MG
15 TABLET, DELAYED RELEASE (ENTERIC COATED) ORAL DAILY PRN
Status: DISCONTINUED | OUTPATIENT
Start: 2019-01-01 | End: 2019-01-01

## 2019-01-01 RX ORDER — ACETAMINOPHEN 325 MG/1
650 TABLET ORAL EVERY 4 HOURS PRN
Status: DISCONTINUED | OUTPATIENT
Start: 2019-01-01 | End: 2019-01-01 | Stop reason: HOSPADM

## 2019-01-01 RX ORDER — ACETAMINOPHEN 500 MG
1000 TABLET ORAL
Status: DISCONTINUED | OUTPATIENT
Start: 2019-01-01 | End: 2019-01-01 | Stop reason: HOSPADM

## 2019-01-01 RX ORDER — CIPROFLOXACIN 250 MG/1
250 TABLET, FILM COATED ORAL 2 TIMES DAILY
Status: ON HOLD | COMMUNITY
Start: 2019-01-01 | End: 2019-01-01

## 2019-01-01 RX ORDER — LORAZEPAM 0.5 MG/1
0.25 TABLET ORAL AT BEDTIME
COMMUNITY

## 2019-01-01 RX ORDER — CEFAZOLIN SODIUM 2 G/100ML
2 INJECTION, SOLUTION INTRAVENOUS
Status: COMPLETED | OUTPATIENT
Start: 2019-01-01 | End: 2019-01-01

## 2019-01-01 RX ORDER — LIDOCAINE HYDROCHLORIDE 20 MG/ML
INJECTION, SOLUTION INFILTRATION; PERINEURAL PRN
Status: DISCONTINUED | OUTPATIENT
Start: 2019-01-01 | End: 2019-01-01

## 2019-01-01 RX ORDER — ONDANSETRON 4 MG/1
4 TABLET, ORALLY DISINTEGRATING ORAL EVERY 8 HOURS PRN
Qty: 10 TABLET | Refills: 0 | Status: SHIPPED | OUTPATIENT
Start: 2019-01-01 | End: 2019-01-01

## 2019-01-01 RX ORDER — ONDANSETRON 4 MG/1
4 TABLET, ORALLY DISINTEGRATING ORAL EVERY 6 HOURS PRN
Status: DISCONTINUED | OUTPATIENT
Start: 2019-01-01 | End: 2019-01-01 | Stop reason: HOSPADM

## 2019-01-01 RX ORDER — HYDROMORPHONE HYDROCHLORIDE 1 MG/ML
.3-.5 INJECTION, SOLUTION INTRAMUSCULAR; INTRAVENOUS; SUBCUTANEOUS EVERY 5 MIN PRN
Status: DISCONTINUED | OUTPATIENT
Start: 2019-01-01 | End: 2019-01-01 | Stop reason: HOSPADM

## 2019-01-01 RX ORDER — ONDANSETRON 4 MG/1
4 TABLET, ORALLY DISINTEGRATING ORAL EVERY 6 HOURS PRN
Status: DISCONTINUED | OUTPATIENT
Start: 2019-01-01 | End: 2019-01-01

## 2019-01-01 RX ORDER — ACETAMINOPHEN 325 MG/1
650 TABLET ORAL 3 TIMES DAILY
COMMUNITY
End: 2019-01-01

## 2019-01-01 RX ORDER — AMOXICILLIN 250 MG
2 CAPSULE ORAL 2 TIMES DAILY
Status: DISCONTINUED | OUTPATIENT
Start: 2019-01-01 | End: 2019-01-01 | Stop reason: HOSPADM

## 2019-01-01 RX ORDER — BISACODYL 5 MG
10 TABLET, DELAYED RELEASE (ENTERIC COATED) ORAL DAILY PRN
Status: DISCONTINUED | OUTPATIENT
Start: 2019-01-01 | End: 2019-01-01

## 2019-01-01 RX ORDER — HYDROMORPHONE HYDROCHLORIDE 1 MG/ML
0.2 INJECTION, SOLUTION INTRAMUSCULAR; INTRAVENOUS; SUBCUTANEOUS
Status: DISCONTINUED | OUTPATIENT
Start: 2019-01-01 | End: 2019-01-01

## 2019-01-01 RX ORDER — SULFAMETHOXAZOLE AND TRIMETHOPRIM 400; 80 MG/1; MG/1
1 TABLET ORAL 2 TIMES DAILY
Qty: 20 TABLET | Refills: 0 | COMMUNITY
Start: 2019-01-01 | End: 2019-01-01

## 2019-01-01 RX ORDER — FENTANYL CITRATE 50 UG/ML
25 INJECTION, SOLUTION INTRAMUSCULAR; INTRAVENOUS ONCE
Status: DISCONTINUED | OUTPATIENT
Start: 2019-01-01 | End: 2019-01-01

## 2019-01-01 RX ORDER — LIDOCAINE 4 G/G
1 PATCH TOPICAL EVERY 24 HOURS
Qty: 90 PATCH | Refills: 3 | Status: SHIPPED | OUTPATIENT
Start: 2019-01-01 | End: 2019-01-01

## 2019-01-01 RX ORDER — ONDANSETRON 2 MG/ML
4 INJECTION INTRAMUSCULAR; INTRAVENOUS EVERY 30 MIN PRN
Status: DISCONTINUED | OUTPATIENT
Start: 2019-01-01 | End: 2019-01-01 | Stop reason: HOSPADM

## 2019-01-01 RX ORDER — AMOXICILLIN 250 MG
1 CAPSULE ORAL 2 TIMES DAILY PRN
COMMUNITY
End: 2019-01-01

## 2019-01-01 RX ORDER — LIDOCAINE 50 MG/G
1 PATCH TOPICAL EVERY 24 HOURS
Qty: 30 PATCH | Refills: 0 | DISCHARGE
Start: 2019-01-01 | End: 2019-01-01

## 2019-01-01 RX ORDER — AMOXICILLIN 250 MG
1 CAPSULE ORAL DAILY
Status: DISCONTINUED | OUTPATIENT
Start: 2019-01-01 | End: 2019-01-01

## 2019-01-01 RX ORDER — EPHEDRINE SULFATE 50 MG/ML
INJECTION, SOLUTION INTRAMUSCULAR; INTRAVENOUS; SUBCUTANEOUS PRN
Status: DISCONTINUED | OUTPATIENT
Start: 2019-01-01 | End: 2019-01-01

## 2019-01-01 RX ORDER — LIDOCAINE 4 G/G
1 PATCH TOPICAL
Status: DISCONTINUED | OUTPATIENT
Start: 2019-01-01 | End: 2019-01-01 | Stop reason: HOSPADM

## 2019-01-01 RX ORDER — BISACODYL 5 MG
10 TABLET, DELAYED RELEASE (ENTERIC COATED) ORAL DAILY PRN
Status: DISCONTINUED | OUTPATIENT
Start: 2019-01-01 | End: 2019-01-01 | Stop reason: HOSPADM

## 2019-01-01 RX ORDER — LIDOCAINE 4 G/G
1 PATCH TOPICAL
Status: DISCONTINUED | OUTPATIENT
Start: 2019-01-01 | End: 2019-01-01

## 2019-01-01 RX ORDER — BISACODYL 5 MG
5 TABLET, DELAYED RELEASE (ENTERIC COATED) ORAL DAILY PRN
Status: DISCONTINUED | OUTPATIENT
Start: 2019-01-01 | End: 2019-01-01

## 2019-01-01 RX ORDER — SIMVASTATIN 40 MG
40 TABLET ORAL AT BEDTIME
Status: DISCONTINUED | OUTPATIENT
Start: 2019-01-01 | End: 2019-01-01 | Stop reason: HOSPADM

## 2019-01-01 RX ORDER — OXYCODONE HYDROCHLORIDE 5 MG/1
2.5-5 TABLET ORAL EVERY 4 HOURS PRN
Status: SHIPPED | DISCHARGE
Start: 2019-01-01 | End: 2019-01-01

## 2019-01-01 RX ORDER — POLYETHYLENE GLYCOL 3350 17 G/17G
17 POWDER, FOR SOLUTION ORAL DAILY PRN
Status: DISCONTINUED | OUTPATIENT
Start: 2019-01-01 | End: 2019-01-01

## 2019-01-01 RX ORDER — ACETAMINOPHEN 500 MG
TABLET ORAL
Qty: 50 TABLET | Refills: 11 | Status: ON HOLD | OUTPATIENT
Start: 2019-01-01 | End: 2019-01-01

## 2019-01-01 RX ORDER — SIMVASTATIN 40 MG
40 TABLET ORAL AT BEDTIME
Status: DISCONTINUED | OUTPATIENT
Start: 2019-01-01 | End: 2019-01-01

## 2019-01-01 RX ORDER — LORAZEPAM 0.5 MG/1
0.25 TABLET ORAL 2 TIMES DAILY PRN
COMMUNITY

## 2019-01-01 RX ORDER — ACETAMINOPHEN 325 MG/1
650 TABLET ORAL EVERY 6 HOURS PRN
Qty: 30 TABLET | Refills: 0 | Status: SHIPPED | OUTPATIENT
Start: 2019-01-01 | End: 2019-01-01 | Stop reason: DRUGHIGH

## 2019-01-01 RX ORDER — ACETAMINOPHEN 325 MG/1
975 TABLET ORAL EVERY 8 HOURS
Status: DISCONTINUED | OUTPATIENT
Start: 2019-01-01 | End: 2019-01-01 | Stop reason: HOSPADM

## 2019-01-01 RX ORDER — FENTANYL CITRATE 50 UG/ML
INJECTION, SOLUTION INTRAMUSCULAR; INTRAVENOUS PRN
Status: DISCONTINUED | OUTPATIENT
Start: 2019-01-01 | End: 2019-01-01

## 2019-01-01 RX ORDER — DEXAMETHASONE SODIUM PHOSPHATE 4 MG/ML
INJECTION, SOLUTION INTRA-ARTICULAR; INTRALESIONAL; INTRAMUSCULAR; INTRAVENOUS; SOFT TISSUE PRN
Status: DISCONTINUED | OUTPATIENT
Start: 2019-01-01 | End: 2019-01-01

## 2019-01-01 RX ORDER — AMLODIPINE BESYLATE 5 MG/1
5 TABLET ORAL DAILY
Status: DISCONTINUED | OUTPATIENT
Start: 2019-01-01 | End: 2019-01-01

## 2019-01-01 RX ORDER — ACETAMINOPHEN 160 MG
TABLET,DISINTEGRATING ORAL
Qty: 100 CAPSULE | Refills: 0 | Status: SHIPPED | OUTPATIENT
Start: 2019-01-01 | End: 2019-01-01

## 2019-01-01 RX ORDER — ONDANSETRON 2 MG/ML
INJECTION INTRAMUSCULAR; INTRAVENOUS PRN
Status: DISCONTINUED | OUTPATIENT
Start: 2019-01-01 | End: 2019-01-01

## 2019-01-01 RX ORDER — AMOXICILLIN 250 MG
1 CAPSULE ORAL 2 TIMES DAILY
Status: DISCONTINUED | OUTPATIENT
Start: 2019-01-01 | End: 2019-01-01

## 2019-01-01 RX ORDER — CEFAZOLIN SODIUM 1 G/3ML
1 INJECTION, POWDER, FOR SOLUTION INTRAMUSCULAR; INTRAVENOUS EVERY 8 HOURS
Status: COMPLETED | OUTPATIENT
Start: 2019-01-01 | End: 2019-01-01

## 2019-01-01 RX ADMIN — ASPIRIN 81 MG: 81 TABLET, COATED ORAL at 08:29

## 2019-01-01 RX ADMIN — ACETAMINOPHEN 1000 MG: 500 TABLET, FILM COATED ORAL at 13:06

## 2019-01-01 RX ADMIN — LIDOCAINE 1 PATCH: 560 PATCH PERCUTANEOUS; TOPICAL; TRANSDERMAL at 08:02

## 2019-01-01 RX ADMIN — FENTANYL CITRATE 50 MCG: 50 INJECTION, SOLUTION INTRAMUSCULAR; INTRAVENOUS at 19:08

## 2019-01-01 RX ADMIN — Medication 0.3 MG: at 20:26

## 2019-01-01 RX ADMIN — SENNOSIDES AND DOCUSATE SODIUM 2 TABLET: 8.6; 5 TABLET ORAL at 08:53

## 2019-01-01 RX ADMIN — AMLODIPINE BESYLATE 5 MG: 5 TABLET ORAL at 09:57

## 2019-01-01 RX ADMIN — ACETAMINOPHEN 975 MG: 325 TABLET, FILM COATED ORAL at 13:36

## 2019-01-01 RX ADMIN — AMLODIPINE BESYLATE 5 MG: 5 TABLET ORAL at 09:17

## 2019-01-01 RX ADMIN — PROPOFOL 20 MG: 10 INJECTION, EMULSION INTRAVENOUS at 19:36

## 2019-01-01 RX ADMIN — Medication 0.2 MG: at 20:53

## 2019-01-01 RX ADMIN — PROPOFOL 50 MG: 10 INJECTION, EMULSION INTRAVENOUS at 18:46

## 2019-01-01 RX ADMIN — OXYCODONE HYDROCHLORIDE 5 MG: 5 TABLET ORAL at 12:26

## 2019-01-01 RX ADMIN — ACETAMINOPHEN 650 MG: 325 TABLET, FILM COATED ORAL at 06:31

## 2019-01-01 RX ADMIN — HYDROMORPHONE HYDROCHLORIDE 0.5 MG: 1 INJECTION, SOLUTION INTRAMUSCULAR; INTRAVENOUS; SUBCUTANEOUS at 12:03

## 2019-01-01 RX ADMIN — SIMVASTATIN 40 MG: 40 TABLET, FILM COATED ORAL at 21:30

## 2019-01-01 RX ADMIN — SIMVASTATIN 40 MG: 40 TABLET, FILM COATED ORAL at 21:00

## 2019-01-01 RX ADMIN — ACETAMINOPHEN 650 MG: 325 TABLET, FILM COATED ORAL at 22:07

## 2019-01-01 RX ADMIN — POLYETHYLENE GLYCOL 3350 17 G: 17 POWDER, FOR SOLUTION ORAL at 14:45

## 2019-01-01 RX ADMIN — SENNOSIDES AND DOCUSATE SODIUM 1 TABLET: 8.6; 5 TABLET ORAL at 20:59

## 2019-01-01 RX ADMIN — LIDOCAINE 1 PATCH: 560 PATCH PERCUTANEOUS; TOPICAL; TRANSDERMAL at 14:42

## 2019-01-01 RX ADMIN — SODIUM CHLORIDE, POTASSIUM CHLORIDE, SODIUM LACTATE AND CALCIUM CHLORIDE: 600; 310; 30; 20 INJECTION, SOLUTION INTRAVENOUS at 18:30

## 2019-01-01 RX ADMIN — LIDOCAINE HYDROCHLORIDE 40 MG: 20 INJECTION, SOLUTION INFILTRATION; PERINEURAL at 18:46

## 2019-01-01 RX ADMIN — HYDROMORPHONE HYDROCHLORIDE 0.5 MG: 1 INJECTION, SOLUTION INTRAMUSCULAR; INTRAVENOUS; SUBCUTANEOUS at 09:57

## 2019-01-01 RX ADMIN — SODIUM CHLORIDE: 9 INJECTION, SOLUTION INTRAVENOUS at 23:37

## 2019-01-01 RX ADMIN — DEXAMETHASONE SODIUM PHOSPHATE 4 MG: 4 INJECTION, SOLUTION INTRA-ARTICULAR; INTRALESIONAL; INTRAMUSCULAR; INTRAVENOUS; SOFT TISSUE at 19:08

## 2019-01-01 RX ADMIN — IRON SUCROSE 300 MG: 20 INJECTION, SOLUTION INTRAVENOUS at 08:53

## 2019-01-01 RX ADMIN — CEFAZOLIN 1 G: 1 INJECTION, POWDER, FOR SOLUTION INTRAMUSCULAR; INTRAVENOUS at 10:28

## 2019-01-01 RX ADMIN — OXYCODONE HYDROCHLORIDE 5 MG: 5 TABLET ORAL at 10:28

## 2019-01-01 RX ADMIN — AMLODIPINE BESYLATE 5 MG: 5 TABLET ORAL at 08:29

## 2019-01-01 RX ADMIN — POLYETHYLENE GLYCOL 3350 17 G: 17 POWDER, FOR SOLUTION ORAL at 08:02

## 2019-01-01 RX ADMIN — AMLODIPINE BESYLATE 5 MG: 5 TABLET ORAL at 08:10

## 2019-01-01 RX ADMIN — ONDANSETRON 4 MG: 2 INJECTION INTRAMUSCULAR; INTRAVENOUS at 19:21

## 2019-01-01 RX ADMIN — LISINOPRIL 10 MG: 10 TABLET ORAL at 08:10

## 2019-01-01 RX ADMIN — CEFAZOLIN 1 G: 1 INJECTION, POWDER, FOR SOLUTION INTRAMUSCULAR; INTRAVENOUS at 02:55

## 2019-01-01 RX ADMIN — ACETAMINOPHEN 1000 MG: 500 TABLET, FILM COATED ORAL at 08:02

## 2019-01-01 RX ADMIN — ACETAMINOPHEN 975 MG: 325 TABLET, FILM COATED ORAL at 06:08

## 2019-01-01 RX ADMIN — CEFAZOLIN SODIUM 2 G: 2 INJECTION, SOLUTION INTRAVENOUS at 18:59

## 2019-01-01 RX ADMIN — OXYCODONE HYDROCHLORIDE 5 MG: 5 TABLET ORAL at 05:55

## 2019-01-01 RX ADMIN — ACETAMINOPHEN 975 MG: 325 TABLET, FILM COATED ORAL at 14:19

## 2019-01-01 RX ADMIN — SODIUM CHLORIDE: 9 INJECTION, SOLUTION INTRAVENOUS at 20:59

## 2019-01-01 RX ADMIN — LISINOPRIL 10 MG: 10 TABLET ORAL at 08:29

## 2019-01-01 RX ADMIN — Medication 5 MG: at 19:28

## 2019-01-01 RX ADMIN — SENNOSIDES AND DOCUSATE SODIUM 2 TABLET: 8.6; 5 TABLET ORAL at 21:30

## 2019-01-01 RX ADMIN — GLYCOPYRROLATE 0.3 MG: 0.2 INJECTION, SOLUTION INTRAMUSCULAR; INTRAVENOUS at 19:31

## 2019-01-01 RX ADMIN — SODIUM CHLORIDE: 9 INJECTION, SOLUTION INTRAVENOUS at 21:42

## 2019-01-01 RX ADMIN — Medication 0.2 MG: at 07:47

## 2019-01-01 RX ADMIN — IRON SUCROSE 300 MG: 20 INJECTION, SOLUTION INTRAVENOUS at 11:52

## 2019-01-01 RX ADMIN — NEOSTIGMINE METHYLSULFATE 2.5 MG: 1 INJECTION, SOLUTION INTRAVENOUS at 19:31

## 2019-01-01 RX ADMIN — SODIUM CHLORIDE: 9 INJECTION, SOLUTION INTRAVENOUS at 10:21

## 2019-01-01 RX ADMIN — SENNOSIDES AND DOCUSATE SODIUM 1 TABLET: 8.6; 5 TABLET ORAL at 08:02

## 2019-01-01 RX ADMIN — LISINOPRIL 10 MG: 10 TABLET ORAL at 08:53

## 2019-01-01 RX ADMIN — SENNOSIDES AND DOCUSATE SODIUM 1 TABLET: 8.6; 5 TABLET ORAL at 09:17

## 2019-01-01 RX ADMIN — OXYCODONE HYDROCHLORIDE 5 MG: 5 TABLET ORAL at 09:17

## 2019-01-01 RX ADMIN — ENOXAPARIN SODIUM 30 MG: 30 INJECTION SUBCUTANEOUS at 20:58

## 2019-01-01 RX ADMIN — LISINOPRIL 10 MG: 10 TABLET ORAL at 09:57

## 2019-01-01 RX ADMIN — ACETAMINOPHEN 975 MG: 325 TABLET, FILM COATED ORAL at 13:42

## 2019-01-01 RX ADMIN — SODIUM CHLORIDE, POTASSIUM CHLORIDE, SODIUM LACTATE AND CALCIUM CHLORIDE: 600; 310; 30; 20 INJECTION, SOLUTION INTRAVENOUS at 20:28

## 2019-01-01 RX ADMIN — AMLODIPINE BESYLATE 5 MG: 5 TABLET ORAL at 08:53

## 2019-01-01 RX ADMIN — HYDROMORPHONE HYDROCHLORIDE 0.5 MG: 1 INJECTION, SOLUTION INTRAMUSCULAR; INTRAVENOUS; SUBCUTANEOUS at 14:16

## 2019-01-01 RX ADMIN — SODIUM CHLORIDE: 9 INJECTION, SOLUTION INTRAVENOUS at 08:02

## 2019-01-01 RX ADMIN — HYDROMORPHONE HYDROCHLORIDE 0.2 MG: 1 INJECTION, SOLUTION INTRAMUSCULAR; INTRAVENOUS; SUBCUTANEOUS at 06:04

## 2019-01-01 RX ADMIN — ACETAMINOPHEN 975 MG: 325 TABLET, FILM COATED ORAL at 05:44

## 2019-01-01 RX ADMIN — OXYCODONE HYDROCHLORIDE 5 MG: 5 TABLET ORAL at 13:35

## 2019-01-01 RX ADMIN — OXYCODONE HYDROCHLORIDE 5 MG: 5 TABLET ORAL at 20:59

## 2019-01-01 RX ADMIN — ROCURONIUM BROMIDE 25 MG: 10 INJECTION INTRAVENOUS at 18:46

## 2019-01-01 RX ADMIN — ACETAMINOPHEN 975 MG: 325 TABLET, FILM COATED ORAL at 21:00

## 2019-01-01 RX ADMIN — OXYCODONE HYDROCHLORIDE 2.5 MG: 5 TABLET ORAL at 18:48

## 2019-01-01 RX ADMIN — ACETAMINOPHEN 975 MG: 325 TABLET, FILM COATED ORAL at 21:31

## 2019-01-01 RX ADMIN — SIMVASTATIN 40 MG: 40 TABLET, FILM COATED ORAL at 22:07

## 2019-01-01 RX ADMIN — ACETAMINOPHEN 1000 MG: 500 TABLET, FILM COATED ORAL at 17:04

## 2019-01-01 RX ADMIN — LISINOPRIL 10 MG: 10 TABLET ORAL at 09:17

## 2019-01-01 ASSESSMENT — ACTIVITIES OF DAILY LIVING (ADL)
ADLS_ACUITY_SCORE: 28
ADLS_ACUITY_SCORE: 31
ADLS_ACUITY_SCORE: 32
ADLS_ACUITY_SCORE: 33
ADLS_ACUITY_SCORE: 33
ADLS_ACUITY_SCORE: 31
ADLS_ACUITY_SCORE: 32
ADLS_ACUITY_SCORE: 33
ADLS_ACUITY_SCORE: 32
ADLS_ACUITY_SCORE: 33
ADLS_ACUITY_SCORE: 33
ADLS_ACUITY_SCORE: 31
ADLS_ACUITY_SCORE: 31
ADLS_ACUITY_SCORE: 32
ADLS_ACUITY_SCORE: 32
ADLS_ACUITY_SCORE: 31
ADLS_ACUITY_SCORE: 31
ADLS_ACUITY_SCORE: 32
ADLS_ACUITY_SCORE: 32
ADLS_ACUITY_SCORE: 33

## 2019-01-01 ASSESSMENT — MIFFLIN-ST. JEOR
SCORE: 916.64
SCORE: 930.24

## 2019-01-01 ASSESSMENT — ENCOUNTER SYMPTOMS
BACK PAIN: 0
ARTHRALGIAS: 1
FEVER: 0
NECK PAIN: 0
MYALGIAS: 1
MYALGIAS: 1
DIARRHEA: 0
ABDOMINAL PAIN: 0
APPETITE CHANGE: 1
ABDOMINAL DISTENTION: 1
CONSTIPATION: 1
VOMITING: 0
BACK PAIN: 0
SHORTNESS OF BREATH: 0
ARTHRALGIAS: 1
NAUSEA: 0
COUGH: 0

## 2019-01-16 NOTE — LETTER
"    1/16/2019        RE: Aura Broderick  [facility]  [address]  [address]  [facility] [address]        [place] [facility]    Chief Complaint   Patient presents with     RECHECK       [facility] Record Number:  [id number]  Place of Service where encounter took place:  [facility] [[id number]]    HPI:    Aura Broderick is a 89 year old  (9/25/1926), who is being seen today for an episodic care visit.  HPI information obtained from: facility chart records, facility staff and [facility] chart review.    Aura Schwartz) seen today with her  present in their memory care unit has PMH which includes, cognitive loss, HTN, increased lipids, MI, UTIs, constipation who first moved into [facility] with her  in 10/2017 transferring to [facility] for increased services 2/2018 when her  was hospitalized. He has moved into [facility] with her. Today during our visit he provides most of her history.      Personal history of urinary tract infection  Most recently in ED 5/29 for condition and again 6/15 for ongoing abdominal pain. She denies hematuria, dysuria today. Has prn phenazopyridine (no recent use) and scheduled cranberry tabs.     Other constipation  Reports she has gone \"ten days without a bowel movement before\". Denies abdominal pain, nausea, vomiting. Was on Miralax daily but she was refusing so moved back to prn. Had rectal pain last week that has now resolved. Unclear if constipation was the issue. She does not want Senna S scheduled either but has prune juice daily with breakfast. Toilets herself and  unaware of her bowel movements so not good record keeping of bowel history. Thought about hemorrhoid cream as well. Her externals did not look aggravated but could be contributor. Will ask family if I should order or if they want to .    Essential hypertension  Hyperlipidemia LDL goal <70  Coronary artery disease " involving native heart without angina pectoris, unspecified vessel or lesion type  BP and weights stable on chart review. BP control improved with increase in Lisinopril. She denies lightheadedness, chest pain. Norvasc, ASA, Statin also daily. Trace edema in legs, no Kushal Hose. Last Echo grossly normal.      Wt:      ALLERGIES: Contrast dye  Past Medical, Surgical, Family and Social History reviewed and updated in [facility].    Current Outpatient Medications   Medication Sig Dispense Refill     amLODIPine (NORVASC) 5 MG tablet TAKE 1 TABLET BY MOUTH ONCE DAILY 30 tablet 98     AMOXICILLIN PO Take 2,000 mg by mouth as needed (1 hour prior to dental apt.)       aspirin 81 MG EC tablet Take 1 tablet (81 mg) by mouth daily 50 tablet 1     Bisacodyl 10 MG/30ML ENEM Place 30 mLs (10 mg) rectally as needed 30 mL 3     Cranberry (THERACRAN ONE) 360 MG CAPS Take 1 capsule by mouth 2 times daily       glycerin, adult, 2 g SUPP Suppository Place 1 suppository rectally daily as needed for constipation 15 suppository 0     lisinopril (PRINIVIL/ZESTRIL) 10 MG tablet TAKE 1 TABLET BY MOUTH ONCE DAILY 30 tablet 11     loperamide (IMODIUM A-D) 2 MG tablet Take 2 tabs (4 mg) after first loose stool, and then take one tab (2 mg) after each diarrheal stool.  Max of 8 tabs (16 mg) per day. 30 tablet 0     PHENAZOPYRIDINE HCL PO Take 95 mg by mouth daily as needed for irritation        polyethylene glycol (MIRALAX/GLYCOLAX) Packet Take 17 g by mouth daily as needed        senna-docusate (SENOKOT-S;PERICOLACE) 8.6-50 MG per tablet Take 1 tablet by mouth daily as needed        simvastatin (ZOCOR) 40 MG tablet TAKE 1 TABLET BY MOUTH AT BEDTIME 30 tablet 97     VITAMIN D, CHOLECALCIFEROL, PO Take 2,000 Units by mouth daily       Patient Active Problem List   Diagnosis     CAD (coronary artery disease)     HTN (hypertension)     Cognitive impairment     Senile osteoporosis     Pulmonary nodule     ACP (advance care planning)     Bacteremia     Hip  fracture (H)     Anemia due to blood loss, acute     Physical deconditioning     Constipation     Tension headache     Alternating constipation and diarrhea     Late onset Alzheimer's disease without behavioral disturbance     Benign hypertension with CKD (chronic kidney disease) stage III (H)       Medications reviewed:  Medications reconciled to facility chart and changes were made to reflect current medications as identified as above med list. Below are the changes that were made:   Medications stopped since last EPIC medication reconciliation:   Medications Discontinued During This Encounter   Medication Reason     belladonna-opium (B&O SUPPRETTES) 16.2-30 MG per suppository Medication Reconciliation Clean Up     Misc Natural Products (COLON CARE PO) Medication Reconciliation Clean Up       Medications started since last [facility] medication reconciliation:  No orders of the defined types were placed in this encounter.      REVIEW OF SYSTEMS:  Limited secondary to cognitive impairment but today pt reports fine    Physical Exam:  /60   Pulse 84   Temp 97.5  F (36.4  C)   Resp 18   GENERAL APPEARANCE:  Alert, in no distress  ENT:  Mouth and posterior oropharynx normal, dry mucous membranes  EYES:  EOM, conjunctivae, lids, pupils and irises normal-glasses  NECK:  No adenopathy,masses or thyromegaly  RESP:  respiratory effort and palpation of chest normal, diminished breath sounds throughout  CV:  Palpation and auscultation of heart done , regular rate and rhythm, no murmur, rub, or gallop, peripheral edema trace+ in legs, ankles and feet  ABDOMEN:  normal bowel sounds, soft, nontender  M/S:   Gait and station at baseline with walker  SKIN:  pale and dry to visible areas  PSYCH:  insight and judgement impaired    Recent Labs:  CBC RESULTS:   Recent Labs   Lab Test 06/15/18  1250 05/29/18 2011   WBC 8.0 5.3   RBC 4.60 4.32   HGB 13.8 13.0   HCT 42.4 39.6   MCV 92 92   MCH 30.0 30.1   MCHC 32.5 32.8   RDW 13.5  13.3    239       Last Basic Metabolic Panel:  Recent Labs   Lab Test 09/05/18  0615 06/15/18  1250    139   POTASSIUM 4.0 4.3   CHLORIDE 106 105   LEFTY 9.0 9.2   CO2 27 26   BUN 33* 27   CR 1.15* 1.30*   GLC 79 84       Liver Function Studies -   Recent Labs   Lab Test 06/15/18  1250 05/29/18 2011   PROTTOTAL 7.5 6.5*   ALBUMIN 3.5 3.4   BILITOTAL 0.5 0.5   ALKPHOS 81 75   AST 16 17   ALT 15 15       TSH   Date Value Ref Range Status   07/26/2013 1.640 mcU/mL Final   06/27/2012 1.190 mcU/mL Final     Assessment/Plan:  (Z87.440) Personal history of urinary tract infection  (primary encounter diagnosis)  Comment: Hx of UTI  Plan:   -Cranberry tabs BID  -Monitor for s/s of infection  -Consider estradiol cream   -phenazopyridine 95 mg po daily prn     (K59.09) Other constipation  Comment: Chronic- now all medications are prn  Plan:   -Miralax daily prn  -Senna S 1 tablet daily prn  -Bisacodyl enema and Glycerin suppository prn   -Staff inquiring daily about bowel movements    (I10) Essential hypertension  (E78.5) Hyperlipidemia LDL goal <70  (I25.10) Coronary artery disease involving native heart without angina pectoris, unspecified vessel or lesion type  Comment: Chronic  Plan:   -Amlodipine 5 mg po every day  -ASA 81 mg po every day  -Lisinopril 10 mg po every day  -Weights and VS monthly by facility  -Zocor 40 mg po every day  -BMP and CBC periodically            Electronically signed by  RODY Chaves CNP                        Sincerely,        RODY Chaves CNP

## 2019-02-01 NOTE — TELEPHONE ENCOUNTER
nsg called and noted pt has pain R upper thigh,   No visual abnormalities.   No recent trauma.   No redness or swelling, no warmth.   _+ pedal pulse.       No new orders - cont to monitor and update provider if worsens.

## 2019-02-02 PROBLEM — M79.604 ACUTE PAIN OF LOWER EXTREMITY, RIGHT: Status: ACTIVE | Noted: 2019-01-01

## 2019-02-02 NOTE — PROGRESS NOTES
Admitted with constipation/rt leg pain. Patient alert and  oriented to self, time. Disoriented to date and situation. Forgetful, taking about brusting pipe in the hospital and making a huge sound??  Regular diet. Enema administered in ED, no result yet, administered schedule Miralx. C/O rt leg pain. Administered schedule Lidocaine patch. CT rt hip ordered. MRI result pending. Plan for PT/SW in the morning.

## 2019-02-02 NOTE — DISCHARGE INSTRUCTIONS
I recommend that C you have her bowel regimen increased to scheduled dosing including scheduled suppositories until she is having regular soft stools.  She should be encouraged to drink plenty of fluids to stay well-hydrated which will help her have a bowel movement.  She should return the emergency department if she has worsening abdominal pain, develops fevers or is unable to have a bowel movement or stops passing gas.

## 2019-02-02 NOTE — ED NOTES
Bed: ED10  Expected date:   Expected time:   Means of arrival:   Comments:  Triage , pt from [facility]

## 2019-02-02 NOTE — PROGRESS NOTES
Delirium packet and education provided to patient. Pt voices understanding and is reading packet with glasses in place.

## 2019-02-02 NOTE — PROGRESS NOTES
RECEIVING UNIT ED HANDOFF REVIEW    ED Nurse Handoff Report was reviewed by: Marissa Webber on February 2, 2019 at 1:40 PM

## 2019-02-02 NOTE — ED NOTES
"[facility]  ED Nurse Handoff Report    ED Chief complaint: Leg Pain (pt empacted per family, recent run through at [facility] with gastroeneritis , right thigh is also hurting ) and Constipation (c/o abd discomfort. )      ED Diagnosis:   Final diagnoses:   Pain of right thigh   Constipation, unspecified constipation type       Code Status: Hospitalist to address    Allergies:   Allergies   Allergen Reactions     Contrast Dye        Activity level - Baseline/Home:  Stand with Assist with walker    Activity Level - Current:   Stand with Assist of 2     Needed?: No    Isolation: No  Infection: Not Applicable  Bariatric?: No    Vital Signs:   Vitals:    02/02/19 0846   BP: 161/84   Pulse: 72   Resp: 18   Temp: 97.8  F (36.6  C)   TempSrc: Oral   SpO2: 94%   Weight: 53.5 kg (118 lb)   Height: 1.626 m (5' 4\")       Cardiac Rhythm: ,        Pain level: 0-10 Pain Scale: 0    Is this patient confused?: Yes   Does this patient have a guardian?  Yes         If yes, is there guardianship documents in the Epic \"Code/ACP\" activity?  Yes         Guardian Notified?  Yes  [place] - Suicide Severity Rating Scale Completed?  Yes  If yes, what color did the patient score?  White    Patient Report: Initial Complaint: Pt presents to the ER via son with c/o right leg pain and weakness. Pt fell 2 wks ago, for the past week pt has had increased leg pain and is feeling weak w constipation.   Focused Assessment: Pt ua to get out of bed w/o Ax2 pt c/o leg pain. Pt in a memory care center. Pt pleasant and follows directions well w/o behavioral issues. Family at bedside.   Tests Performed:   Results for orders placed or performed during the hospital encounter of 02/02/19   US Lower Extremity Venous Duplex Right    Narrative    ULTRASOUND VENOUS RIGHT LOWER EXTREMITY  2/2/2019 9:21 AM     HISTORY: Right lower extremity pain. Evaluate for DVT.    COMPARISON: None.    TECHNIQUE: Ultrasound gray scale, Color Doppler flow, " and spectral  Doppler waveform analysis performed.    FINDINGS:  The right common femoral, superficial femoral, popliteal  and posterior tibial veins are patent and fully compressible and  demonstrate normal venous Doppler flow.       Impression    IMPRESSION: No DVT demonstrated.    PAOLO GUO MD   Abdomen XR, 2 vw, flat and upright    Narrative    ABDOMEN TWO VIEWS  2/2/2019 9:50 AM     HISTORY: History of constipation. Question obstruction vs  constipation.    COMPARISON: None.      Impression    IMPRESSION: Moderate amount of stool in the colon could be related to  constipation. There are a few nondistended gas-filled loops of small  bowel. No evidence of obstruction. No free air. Vascular calcification  in the pelvis.    PAOLO GUO MD   XR Pelvis w Hip Right 1 View    Narrative    PELVIS AND RIGHT HIP ONE VIEW   2/2/2019 9:49 AM     HISTORY:  Fall, question fracture.    COMPARISON: None.      Impression    IMPRESSION: No evidence of acute fracture or subluxation. Three  cannulated screws from previous ORIF left hip fracture. Joint spaces  appear well preserved.    PAOLO GUO MD       Abnormal Results:   Treatments provided: Enema    Family Comments: at bedside    OBS brochure/video discussed/provided to patient/family: Yes              Name of person given brochure if not patient: Magy              Relationship to patient: daughter    ED Medications: Medications - No data to display    Drips infusing?:  No    For the majority of the shift this patient was Green.   Interventions performed were   .    Severe Sepsis OR Septic Shock Diagnosis Present: No    To be done/followed up on inpatient unit:   Pt going to MRI at 1325      ED NURSE PHONE NUMBER: [id number]

## 2019-02-02 NOTE — PROVIDER NOTIFICATION
MD Notification    Notified Person: MD    Notified Person Name: Ruddy MD    Notification Date/Time: 1745 2/2/19    Notification Interaction: web based paging    Purpose of Notification: pt voiding often this evening. Every 45min to 1hr.    Orders Received: WARREN HOWARD    Comments:

## 2019-02-02 NOTE — PHARMACY-ADMISSION MEDICATION HISTORY
Admission medication history interview status for the 2/2/2019  admission is complete. See EPIC admission navigator for prior to admission medications     Medication history source reliability:Good    Actions taken by pharmacist (provider contacted, etc):Called nursing home for MAR     Additional medication history information not noted on PTA med list :  -- MAR missing last time pt got meds. Called [facility], and they said pt has med box. They fill it, and pt takes them. RN found that pt took Aspirin in the AM, while he last took amlodipine, lisinopril and simvastatin last night as she takes them at night.     Medication reconciliation/reorder completed by provider prior to medication history? No    Time spent in this activity: 20 min    Prior to Admission medications    Medication Sig Last Dose Taking? Auth Provider   amLODIPine (NORVASC) 5 MG tablet TAKE 1 TABLET BY MOUTH ONCE DAILY  Patient taking differently: TAKE 1 TABLET BY MOUTH ONCE AT BEDTIME 2/1/2019 at PM Yes Alice Neff APRN CNP   aspirin 81 MG EC tablet Take 1 tablet (81 mg) by mouth daily 2/2/2019 at Unknown time Yes Agustin Amato PA-C   Bisacodyl 10 MG/30ML ENEM Place 30 mLs (10 mg) rectally as needed  Yes Mian Ramos MD   Cranberry (THERACRAN ONE) 360 MG CAPS Take 1 capsule by mouth 2 times daily  Yes Reported, Patient   glycerin, adult, 2 g SUPP Suppository Place 1 suppository rectally daily as needed for constipation prn Yes Mian Ramos MD   lisinopril (PRINIVIL/ZESTRIL) 10 MG tablet TAKE 1 TABLET BY MOUTH ONCE DAILY  Patient taking differently: TAKE 1 TABLET BY MOUTH ONCE AT BEDTIME 2/1/2019 at PM Yes Davey Thornton APRN CNP   loperamide (IMODIUM A-D) 2 MG tablet Take 2 tabs (4 mg) after first loose stool, and then take one tab (2 mg) after each diarrheal stool.  Max of 8 tabs (16 mg) per day. prn Yes Cordell Quinn APRN CNP   PHENAZOPYRIDINE HCL PO Take 95 mg by mouth 2 times daily as  needed for irritation  prn Yes Reported, Patient   polyethylene glycol (MIRALAX/GLYCOLAX) Packet Take 17 g by mouth daily as needed  prn Yes Reported, Patient   senna-docusate (SENOKOT-S;PERICOLACE) 8.6-50 MG per tablet Take 1 tablet by mouth daily  2/2/2019 at am Yes Unknown, Entered By History   simvastatin (ZOCOR) 40 MG tablet TAKE 1 TABLET BY MOUTH AT BEDTIME 2/1/2019 at Unknown time Yes Davey Thornton, APRN CNP   VITAMIN D, CHOLECALCIFEROL, PO Take 2,000 Units by mouth daily  Yes Unknown, Entered By History   AMOXICILLIN PO Take 2,000 mg by mouth as needed (1 hour prior to dental apt.) prn  Reported, Patient

## 2019-02-02 NOTE — PROGRESS NOTES
MD Notification    Notified Person: MD    Notified Person Name: Ruddy MD    Notification Date/Time: 1758 2/2/19    Notification Interaction: web based paging    Purpose of Notification: pt requesting advil    Orders Received:    Comments:

## 2019-02-02 NOTE — H&P
[facility]    History and Physical  Hospitalist       Date of Admission:  2/2/2019  Date of Service (when I saw the patient): 02/02/19    Assessment & Plan   Aura Broderick is a 89 year old female with hx of dementia, CAD s/p PCI, HTN, CKD, recurrent UTIs, and recent fall who presents from her memory care unit at [place] for evaluation of persistent RLE pain, and is being admitted for pain control and safe discharge planning    Acute RLE pain  Sustained a witnessed fall at her memory care unit about 10 days PTA - she was rising from a chair when she lost her balance and fell onto her right side. She seemed to be doing well until about 5 days PTA when she began complaining of RLE pain, and then woke up this morning with acute right thigh pain limiting her ability to get out of bed, bear weight, or walk. Exam - ROM and palpation throughout her right leg - has been inconsistent. RLE doppler US on arrival was negative for DVT. Right hip XR was negative for acute dislocations or fractures.  - Lumbar spine MRI ordered by ED provider pending  - Right hip and femur CT to evaluate for occult fracture  - Start Tylenol 1000 mg TID and lidocaine patch to right thigh  - Opioids were offered and declined by the patient  - PT consult    Constipation  - Start senna-docusate #1-2 BID and Miralax daily  - PRN stool softeners available    CAD, native heart, native vessels s/p PCI  Essential hypertension  - Resume PTA amlodipine and lisinopril when reconciled by PharmD  - Resume PTA aspirin and simvastatin at discharge     CKD stage III  Creatinine stable within baseline 1-1.3    Dementia without behavioral disturbance  On delirium protocol    FEN: Regular diet  DVT Prophylaxis: Pneumatic Compression Devices  Code Status: DNR / DNI    Disposition: Expected discharge pending pain control and PT assessment    Fiona Fox    Primary Care Physician   Davey Thornton    Chief Complaint   RLE pain    History is  obtained from the patient, her daughter-in-law and medical records    History of Present Illness   Aura Broderick is a 89 year old female with hx of dementia, CAD, HTN, CKD, recurrent UTIs, and recent fall who presents from her memory care unit at [facility] for evaluation of persistent RLE pain. History is limited by the patient's dementia, so HPI is obtained primarily with the assistance of the patient's daughter-in-law at bedside. The patient sustained a witnessed fall at her memory care unit about 10 days ago - she was rising from a chair when she lost her balance and fell onto her right side. She was able to get back up with assistance, and was ambulatory, but has been complaining of right leg pain over the past 5 days and then woke up this morning with acute right thigh pain limiting her ability to get out of bed, bear weight, or walk.     In the ED, work-up has been unremarkable. RLE doppler US was negative for DVT. Right hip XR was negative for fractures or dislocations. She was going to be discharged back to her care facility, but was unable to bear weight or ambulate.     Past Medical History    I have reviewed this patient's medical history and updated the medical record  Past Medical History:   Diagnosis Date     Dizziness and giddiness      Essential hypertension, benign      Myocardial infarction     LAD - stent - [facility]     Other and unspecified hyperlipidemia      Palpitations      Pure hypercholesterolemia        Past Surgical History   I have reviewed this patient's surgical history and updated the medical record  Past Surgical History:   Procedure Laterality Date     C NONSPECIFIC PROCEDURE      tonsillectomy     C NONSPECIFIC PROCEDURE      D&C     C NONSPECIFIC PROCEDURE  06-7-99    COLONOSCOPY      CLOSED REDUCTION, PERCUTANEOUS PINNING HIP Left 3/16/2016    Procedure: CLOSED REDUCTION, PERCUTANEOUS PINNING HIP;  Surgeon: Sukh Kebede MD;  Location:  OR     CORONARY  ANGIOGRAPHY ADULT ORDER  2008-[facility]    stent       Prior to Admission Medications   Prior to Admission Medications   Prescriptions Last Dose Informant Patient Reported? Taking?   AMOXICILLIN PO prn  Yes No   Sig: Take 2,000 mg by mouth as needed (1 hour prior to dental apt.)   Bisacodyl 10 MG/30ML ENEM   No Yes   Sig: Place 30 mLs (10 mg) rectally as needed   Cranberry (THERACRAN ONE) 360 MG CAPS   Yes Yes   Sig: Take 1 capsule by mouth 2 times daily   PHENAZOPYRIDINE HCL PO   Yes Yes   Sig: Take 95 mg by mouth daily as needed for irritation    VITAMIN D, CHOLECALCIFEROL, PO  Spouse/Significant Other Yes Yes   Sig: Take 2,000 Units by mouth daily   amLODIPine (NORVASC) 5 MG tablet   No Yes   Sig: TAKE 1 TABLET BY MOUTH ONCE DAILY   aspirin 81 MG EC tablet   No Yes   Sig: Take 1 tablet (81 mg) by mouth daily   glycerin, adult, 2 g SUPP Suppository   No Yes   Sig: Place 1 suppository rectally daily as needed for constipation   lisinopril (PRINIVIL/ZESTRIL) 10 MG tablet   No Yes   Sig: TAKE 1 TABLET BY MOUTH ONCE DAILY   loperamide (IMODIUM A-D) 2 MG tablet   No Yes   Sig: Take 2 tabs (4 mg) after first loose stool, and then take one tab (2 mg) after each diarrheal stool.  Max of 8 tabs (16 mg) per day.   opium-belladonna (B&O SUPPRETTES) 30-16.2 MG per suppository   Yes Yes   Sig: Place rectally every 8 hours as needed for moderate to severe pain   polyethylene glycol (MIRALAX/GLYCOLAX) Packet   Yes Yes   Sig: Take 17 g by mouth daily as needed    senna-docusate (SENOKOT-S;PERICOLACE) 8.6-50 MG per tablet  Spouse/Significant Other Yes Yes   Sig: Take 1 tablet by mouth daily as needed    simvastatin (ZOCOR) 40 MG tablet   No Yes   Sig: TAKE 1 TABLET BY MOUTH AT BEDTIME      Facility-Administered Medications: None     Allergies   Allergies   Allergen Reactions     Contrast Dye        Social History   Remote history of smoking. Drinks alcohol rarely. Lives at [facility] with her . Typically ambulates  with a walker.    Family History   I have reviewed this patient's family history  Family History   Problem Relation Age of Onset     Cancer Mother         pancreatic     Heart Disease Father 83        MI     Cancer Brother         renal cell       Review of Systems   The 10 point Review of Systems is negative other than noted in the HPI    Physical Exam   Temp: 97.8  F (36.6  C) Temp src: Oral BP: 161/84 Pulse: 72   Resp: 18 SpO2: 94 % O2 Device: None (Room air)    Vital Signs with Ranges  Temp:  [97.8  F (36.6  C)] 97.8  F (36.6  C)  Pulse:  [72] 72  Resp:  [18] 18  BP: (161)/(84) 161/84  SpO2:  [94 %] 94 %  118 lbs 0 oz    Constitutional: Appears comfortable, NAD  HEENT: NC/AT, sclera white, conjunctiva clear, EOMI, MMM  Respiratory: Breathing non-labored. Lungs CTAB - no wheezes/crackles/rhonchi  Cardiovascular: Heart RRR, no m/r/g. No pedal edema.   GI: +BS. Abd soft/NT  Lymph/Hematologic: No cervical LAD  Skin: Warm and dry. No rash.  Musculoskeletal: Normal muscle bulk and tone. Moderate TTP over anterior right thigh, normal ROM  Neurologic: Alert and appropriate, oriented to self and place only. SHIN  Psychiatric: Calm and cooperative    Data   Data reviewed today:  I personally reviewed the abdominal x-ray image(s) showing moderate amount of stool and the right hip XR and doppler US image(s) showing no fractures or disolocations, and no evidence of DVT respectively.  Recent Labs   Lab 01/31/19  0715   WBC 4.4   HGB 13.1   MCV 91         POTASSIUM 3.8   CHLORIDE 109   CO2 22   BUN 28   CR 1.03   ANIONGAP 8   LEFTY 8.3*   GLC 81   ALBUMIN 2.8*   PROTTOTAL 5.9*   BILITOTAL 0.3   ALKPHOS 57   ALT 17   AST 22       Recent Results (from the past 24 hour(s))   US Lower Extremity Venous Duplex Right    Narrative    ULTRASOUND VENOUS RIGHT LOWER EXTREMITY  2/2/2019 9:21 AM     HISTORY: Right lower extremity pain. Evaluate for DVT.    COMPARISON: None.    TECHNIQUE: Ultrasound gray scale, Color Doppler flow,  and spectral  Doppler waveform analysis performed.    FINDINGS:  The right common femoral, superficial femoral, popliteal  and posterior tibial veins are patent and fully compressible and  demonstrate normal venous Doppler flow.       Impression    IMPRESSION: No DVT demonstrated.    PAOLO GUO MD   XR Pelvis w Hip Right 1 View    Narrative    PELVIS AND RIGHT HIP ONE VIEW   2/2/2019 9:49 AM     HISTORY:  Fall, question fracture.    COMPARISON: None.      Impression    IMPRESSION: No evidence of acute fracture or subluxation. Three  cannulated screws from previous ORIF left hip fracture. Joint spaces  appear well preserved.    PAOLO GUO MD   Abdomen XR, 2 vw, flat and upright    Narrative    ABDOMEN TWO VIEWS  2/2/2019 9:50 AM     HISTORY: History of constipation. Question obstruction vs  constipation.    COMPARISON: None.      Impression    IMPRESSION: Moderate amount of stool in the colon could be related to  constipation. There are a few nondistended gas-filled loops of small  bowel. No evidence of obstruction. No free air. Vascular calcification  in the pelvis.    PAOLO GUO MD

## 2019-02-02 NOTE — ED PROVIDER NOTES
History     Chief Complaint:  Leg pain and constipation     HPI   Aura Broderick is a 89 year old female status post left hip pinning who presents with family for evaluation of right leg pain and constipation. The patient currently resided at an independent apartment with her  at [facility] living [facility]. The patient's family state that approximately two weeks ago they were called after she had suffered a fall down onto her right side when getting out of her chair. She was checked out and evaluated and has been ambulatory since the incident. However, likewise the care facility has had a viral GI illness that has gone through the residents though the patient never complained of nausea, vomiting, or diarrhea. However, the patient states that she has been experiencing a bloating sensation and some nausea. The staff at the nursing facility got a flat abdominal X-ray which revealed a large amount stool in her lower intestinal tract but no signs of obstruction. Over the past 5 days or so, the patient has been unusually bed ridden, not getting out much or moving, and has been complaining of some pain in her right hip and lateral thigh. The patient's family has been trying to keep an eye on it and the staff have been giving the patient suppositories to relieve the constipation. However, nothing has been improving and the patient has had difficulty sleeping at night due to pain and discomfort. Last night, the patient actually improved but again woke up this morning complaining of right lateral thigh pain, and still has not had a large bowel movement. The patient herself complains of bloating and leg pain but denies abdominal pain. She otherwise denies shortness of breath, vomiting, or urinary symptoms. She has no back pain.     Allergies:  Contrast Dye      Medications:    Norvasc  Aspirin  Lisinopril  Imodium  Phenazopyridine  Zocor    Past Medical History:    CKD stage III  Alternating constipation and  "diarrhea  Late onset Alzheimer disease  Physical deconditioning  Tension headache  Hypertension  CAD  Cognitive impairment  Osteoporosis  MI    Past Surgical History:    Tonsillectomy  D&C  Left hip pinning   Coronary angiography     Family History:    Pancreatic cancer     Social History:  Smoking Status: Former Smoker  Alcohol Use: Yes  Patient presents with son (OB intensivist at [employer]) and sister-in-law.   Marital Status:       Review of Systems   Constitutional: Positive for appetite change. Negative for fever.   Respiratory: Negative for cough and shortness of breath.    Gastrointestinal: Positive for abdominal distention and constipation. Negative for diarrhea, nausea and vomiting.   Musculoskeletal: Positive for arthralgias, gait problem and myalgias. Negative for back pain.   All other systems reviewed and are negative.      Physical Exam     Patient Vitals for the past 24 hrs:   BP Temp Temp src Pulse Resp SpO2 Height Weight   02/02/19 0846 161/84 97.8  F (36.6  C) Oral 72 18 94 % 1.626 m (5' 4\") 53.5 kg (118 lb)        Physical Exam  Constitutional: Alert, attentive, GCS 15  HENT:    Nose: Nose normal.    Mouth/Throat: Oropharynx is clear, mucous membranes are moist  Eyes: EOM are normal, anicteric, conjugate gaze  CV: regular rate and rhythm; no murmurs  Chest: Effort normal and breath sounds clear without wheezing or rales, symmetric bilaterally   GI:  non tender. No distension. No guarding or rebound.    MSK: No LE edema, no tenderness to palpation of BLE. No back tenderness. No tenderness to palpation of the calf, knee, or anterior posterior muscle compartments of right leg. No tenderness with passive external and internal rotation, or flexion or extension of the knee. No tenderness with axial loading.  Neurological: Alert, attentive, moving all extremities equally.   Skin: Skin is warm and dry.     Emergency Department Course     Imaging:  Radiographic findings were communicated with the " patient and family who voiced understanding of the findings.    X-ray Pelvis and right hip, 1 view:  No evidence of acute fracture or subluxation. Three  cannulated screws from previous ORIF left hip fracture. Joint spaces  appear well preserved.  Result per radiology.      X-ray Abdomen, flat and upright views:  Moderate amount of stool in the colon could be related to  constipation. There are a few nondistended gas-filled loops of small  bowel. No evidence of obstruction. No free air. Vascular calcification  in the pelvis.  Result per radiology.      US right lower extremity duplex:  No DVT demonstrated.  As read by Radiology.     Emergency Department Course:  Past medical records, nursing notes, and vitals reviewed.  0850: I performed an exam of the patient and obtained history, as documented above.   IV inserted and blood drawn.     The patient was sent for a X-ray while in the emergency department, findings above.       1159: I rechecked the patient. Findings and plan explained to the Patient. Patient discharged home with instructions regarding supportive care, medications, and reasons to return. The importance of close follow-up was reviewed.        Impression & Plan      Medical Decision Makin-year-old woman with past medical history significant for alternating diarrhea and constipation episodes, pretension, coronary artery disease, left-sided hip fracture status post ORIF, Alzheimer's presenting for evaluation of right upper leg pain with a fall 2 weeks prior and possible obstipation.  Vital signs on arrival are within normal limits other than mildly elevated blood pressure.  On exam, she is not had any overt right leg tenderness including with full range of motion, palpation and axial loading suggestive against of a fracture even occult fracture as her x-ray of her right hip was negative for fracture.  Given her fall was 2 weeks prior, occult fracture is much less likely.  I did like to get a right  leg ultrasound that she has been more immobile due to the pain and this was negative for DVT.  I opted to get a two-view x-ray of her abdomen given a long-standing history of constipation which showed mild to moderate amount of stool burden in the rectal vault and she tolerated an enema well with only minimal small output.  I did discuss disimpaction with her and her son who is OB physician and they agreed for more conservative management with serial suppositories/enemas and plan for continued bowel regimen at home.  At the time of discharge, patient had more increased pain in her leg especially with flexing forward suggestive of possible herniated disc and there were concerns about her safety at her assisted living as she essentially needs 2 person support for toileting.  As such, she was felt unsafe for discharge and was admitted to observation for PT OT evaluation, possible TCU placement within [place].  I did order an MRI of her lumbar spine to assess her with radiculopathy given her benign leg exam.  Patient's son was accepting of this plan.    Diagnosis:    ICD-10-CM    1. Pain of right thigh M79.651    2. Constipation, unspecified constipation type K59.00        Dispo:  Admit to hospital, OBS    Connor Riddle MD   [employer]  2:37 PM 02/02/19       Abraham Mcdonald  2/2/2019    EMERGENCY DEPARTMENT  I, Abraham Mcdonald, am serving as a scribe at 8:50 AM on 2/2/2019 to document services personally performed by Connor Riddle MD based on my observations and the provider's statements to me.       Connor Riddle MD  02/02/19 7283

## 2019-02-02 NOTE — PLAN OF CARE
PRIMARY DIAGNOSIS: ACUTE PAIN  OUTPATIENT/OBSERVATION GOALS TO BE MET BEFORE DISCHARGE:  1. Pain Status: Improved-controlled with oral pain medications and lidocaine patch.    2. Return to near baseline physical activity: No    3. Cleared for discharge by consultants (if involved): No    Discharge Planner Nurse   Safe discharge environment identified: No  Barriers to discharge: Yes       Entered by: Magy Donovan 02/02/2019 4:30 PM     Please review provider order for any additional goals.   Nurse to notify provider when observation goals have been met and patient is ready for discharge.

## 2019-02-02 NOTE — ED AVS SNAPSHOT
Emergency Department  [address] [place] [id number]  Phone:  [phone]  Fax:  [phone]                                    Aura Broderick   MRN: [id number]    Department:   Emergency Department   Date of Visit:  2/2/2019           After Visit Summary Signature Page    I have received my discharge instructions, and my questions have been answered. I have discussed any challenges I see with this plan with the nurse or doctor.    ..........................................................................................................................................  Patient/Patient Representative Signature      ..........................................................................................................................................  Patient Representative Print Name and Relationship to Patient    ..................................................               ................................................  Date                                   Time    ..........................................................................................................................................  Reviewed by Signature/Title    ...................................................              ..............................................  Date                                               Time          22EPIC Rev 08/18

## 2019-02-03 NOTE — PROGRESS NOTES
Observation goals PRIOR TO DISCHARGE     Comments:   -Diagnostic tests and consults completed and resulted -Met    -Vital signs normal or at patient baseline -Not met, systolic elevated    -Adequate pain control on oral analgesics -Met, denies pain at rest    -Safe disposition plan has been identified -Not met    Nurse to notify provider when observation goals have been met and patient is ready for discharge.

## 2019-02-03 NOTE — PLAN OF CARE
Alert, oriented to self, Ax2 turn and reposition. Oxycodone, tylenol, and lidocaine patch for right leg discomfort, BM this evening, incontinent urine at times. SW and PT consulted. CT scan completed. UA collected.

## 2019-02-03 NOTE — PROGRESS NOTES
Writer did not meet with the patient.  Per SW the patient's son has chosen [facility] for discharge to [facility].  Referral emailed to [facility] MD is completing the orders to include  PT and f2f.

## 2019-02-03 NOTE — PLAN OF CARE
PRIMARY DIAGNOSIS: ACUTE PAIN  OUTPATIENT/OBSERVATION GOALS TO BE MET BEFORE DISCHARGE:  1. Pain Status: Improved-controlled with oral pain medications and lidocaine patch.    2. Return to near baseline physical activity: No    3. Cleared for discharge by consultants (if involved): No    Discharge Planner Nurse   Safe discharge environment identified: No  Barriers to discharge: Yes       Entered by: Magy Donovan 02/02/2019 7:49 PM     Please review provider order for any additional goals.   Nurse to notify provider when observation goals have been met and patient is ready for discharge.

## 2019-02-03 NOTE — PROGRESS NOTES
AAKASH  D: [facility] contacted and notified that pt is medically stable for discharge. Per pt she would like to use [facility] & will be arranged by the care coordinator. Discharge paperwork faxed.  P: Pt plans to discharge back to [facility] with the addition of [facility].

## 2019-02-03 NOTE — DISCHARGE SUMMARY
[facility]  Hospitalist Discharge Summary       Date of Admission:  2/2/2019  Date of Discharge:  2/3/2019  Discharging Provider: Latasha Ward PA-C      Discharge Diagnoses   1. Right lower extremity pain  2. CAD  3. CKD stage III  4. Dementia  5. Constipation    Follow-ups Needed After Discharge   Follow-up Appointments     Follow-up and recommended labs and tests       Follow up with primary care provider, Davey Thornton, within 7 days for hospital follow- up.  No follow up labs or test are needed.               Unresulted Labs Ordered in the Past 30 Days of this Admission     Date and Time Order Name Status Description    2/2/2019 1830 Urine Culture Aerobic Bacterial Preliminary       These results will be followed up by [facility] provider    Hospital Course   Aura Broderick is a 89 year old female with hx of dementia, CAD s/p PCI, HTN, CKD, recurrent UTIs, and recent fall who presents from her memory care unit at [place] for evaluation of persistent RLE pain, and is being admitted for pain control and safe discharge planning        Acute RLE pain  Sustained a witnessed fall at her memory care unit about 10 days PTA - she was rising from a chair when she lost her balance and fell onto her right side. She seemed to be doing well until about 5 days PTA when she began complaining of RLE pain, and then woke up this morning with acute right thigh pain limiting her ability to get out of bed, bear weight, or walk. Exam - ROM and palpation throughout her right leg - has been inconsistent. RLE doppler US on arrival was negative for DVT. Right hip XR was negative for acute dislocations or fractures.  - Lumbar spine MRI and CT of the hip without evidence of acute fracture.  - Start Tylenol 1000 mg TID and lidocaine patch to right thigh  - Opioids were taken once last night and helped patient sleep. Script given for discharge.  - Appreciate PT assistance recommending home PT at the [facility]  care unit.     Constipation  - Start senna-docusate #1-2 BID and Miralax daily  - PRN stool softeners available     CAD, native heart, native vessels s/p PCI  Essential hypertension  - Resume PTA amlodipine and lisinopril   - Resume PTA aspirin and simvastatin at discharge      CKD stage III  Creatinine stable within baseline 1-1.3     Dementia without behavioral disturbance  On delirium protocol        Consultations This Hospital Stay   SOCIAL WORK IP CONSULT  PHYSICAL THERAPY ADULT IP CONSULT    Code Status   DNR/DNI    Time Spent on this Encounter   ILatasha, personally saw the patient today and spent greater than 30 minutes discharging this patient.       Latasha Ward, TOBY  [facility]  ______________________________________________________________________    Physical Exam   Vital Signs: Temp: 96.7  F (35.9  C) Temp src: Oral BP: 140/63 Pulse: 73 Heart Rate: 59 Resp: 16 SpO2: 98 % O2 Device: None (Room air)    Weight: 115 lbs 0 oz  General Appearance: Alert and oriented, NAD, up in chair  Respiratory: CTA  Cardiovascular: rrr s1 s2 no murmurs  GI: soft, NT, ND, BS present  Skin: warm and dry, no rashes,   Other: normal affect, soft spoken        Primary Care Physician   Davey Thornton    Discharge Disposition   Discharged to memory care unit  Condition at discharge: Good    Significant Results and Procedures   Results for orders placed or performed during the hospital encounter of 02/02/19   US Lower Extremity Venous Duplex Right    Narrative    ULTRASOUND VENOUS RIGHT LOWER EXTREMITY  2/2/2019 9:21 AM     HISTORY: Right lower extremity pain. Evaluate for DVT.    COMPARISON: None.    TECHNIQUE: Ultrasound gray scale, Color Doppler flow, and spectral  Doppler waveform analysis performed.    FINDINGS:  The right common femoral, superficial femoral, popliteal  and posterior tibial veins are patent and fully compressible and  demonstrate normal venous Doppler flow.        Impression    IMPRESSION: No DVT demonstrated.    PAOLO GUO MD   Abdomen XR, 2 vw, flat and upright    Narrative    ABDOMEN TWO VIEWS  2/2/2019 9:50 AM     HISTORY: History of constipation. Question obstruction vs  constipation.    COMPARISON: None.      Impression    IMPRESSION: Moderate amount of stool in the colon could be related to  constipation. There are a few nondistended gas-filled loops of small  bowel. No evidence of obstruction. No free air. Vascular calcification  in the pelvis.    PAOLO GUO MD   XR Pelvis w Hip Right 1 View    Narrative    PELVIS AND RIGHT HIP ONE VIEW   2/2/2019 9:49 AM     HISTORY:  Fall, question fracture.    COMPARISON: None.      Impression    IMPRESSION: No evidence of acute fracture or subluxation. Three  cannulated screws from previous ORIF left hip fracture. Joint spaces  appear well preserved.    PAOLO GUO MD   Lumbar spine MRI w/o contrast    Narrative    MRI LUMBAR SPINE WITHOUT CONTRAST   2/2/2019 1:51 PM     HISTORY: See the clinical information for interpreting provider. Right  upper leg pain. Question herniated disc.    TECHNIQUE: Multiplanar multisequence MRI of the lumbar spine without  contrast.    COMPARISON: None.    FINDINGS: The report is dictated assuming five lumbar-type vertebral  bodies.  Sagittal images demonstrate normal vertebral body height.  Somewhat mottled bone marrow signal intensity pattern likely due to  some residual or recurrent red marrow. Tip of the conus medullaris and  cauda equina are unremarkable.     T12-L1:  Small left central disc protrusion. No stenosis. Facet joints  are unremarkable.    L1-L2:  Mild central disc protrusion. No stenosis. Facet joints are  unremarkable.    L2-L3:  Mild to moderate broad-based disc bulge lateralizes slightly  to the left. No central or foraminal stenosis. Facet joints are  unremarkable.    L3-L4:  Mild disc bulge and facet hypertrophy. Mild to moderate  central stenosis. Mild right foraminal  stenosis. Left neural foramen  is patent.    L4-L5:  Moderate facet degenerative changes. Left greater than right  facet hypertrophy. Mild grade 1 degenerative spondylolisthesis.  Moderate central stenosis. Prominent left subarticular stenosis with  contact and probable compression of the descending left L5 nerve root.  Neural foramina are patent.    L5-S1:  Advanced degenerative disc disease with mild disc bulge and  osteophytic ridging lateralizing to the left. No central stenosis.  Mild left foraminal stenosis. Right neural foramen is patent. No  central stenosis.    Paraspinous soft tissues:  Unremarkable.      Impression    IMPRESSION:    1. At L3-L4 there is mild right foraminal stenosis. Mild to moderate  central stenosis.  2. At L4-L5 there is grade 1 degenerative spondylolisthesis. Moderate  central stenosis. Prominent left subarticular stenosis with contact  and probable compression of the descending left L5 nerve root.  3. At L5-S1 there is mild left foraminal stenosis.  4. No evidence of fracture or compression fracture.    PAOLO GUO MD   CT Hip Right w/o Contrast    Narrative    CT HIP RIGHT WITHOUT CONTRAST 2/2/2019 4:16 PM     HISTORY: Hip trauma, fracture known or suspected, x-ray insufficient.    TECHNIQUE: Axial scans were performed through the pelvis and right hip  with sagittal and coronal reconstruction. Radiation dose for this scan  was reduced using automated exposure control, adjustment of the mA  and/or kV according to patient size, or iterative reconstruction  technique.    COMPARISON: X-ray from 2/2/2019.    FINDINGS: There is subtle subchondral lucency in the anteromedial  aspect of the femoral head. There is also subchondral sclerosis in  this region. This is likely degenerative in nature or perhaps related  to osteopenia. Doubt subtle subchondral insufficiency fracture. There  is diffuse osteopenia. Mild right hip joint space narrowing previous  ORIF left hip.    Degenerative changes  of the lower lumbar region. There is an area of  sclerosis in the L4 vertebra that is adjacent to the endplate and may  be degenerative in nature.      Impression    IMPRESSION:  1. No evidence of femoral neck fracture.  2. Degenerative changes of the right hip with subchondral sclerosis  and some subchondral lucency likely degenerative in nature. Doubt  subchondral minimal insufficiency fracture.    PAOLO GUO MD       Discharge Orders      Home Care PT Referral for Hospital Discharge      Activity - Up with assistive device     Reason for your hospital stay    Right lower extremity pain     Follow-up and recommended labs and tests     Follow up with primary care provider, Davey Thornton, within 7 days for hospital follow- up.  No follow up labs or test are needed.     MD face to face encounter    Documentation of Face to Face and Certification for Home Health Services    I certify that patient: Aura Broderick is under my care and that I, or a nurse practitioner or physician's assistant working with me, had a face-to-face encounter that meets the physician face-to-face encounter requirements with this patient on: 2/3/2019.    This encounter with the patient was in whole, or in part, for the following medical condition, which is the primary reason for home health care: deconditioning, memory impairment, RLE pain.    I certify that, based on my findings, the following services are medically necessary home health services: Physical Therapy.    My clinical findings support the need for the above services because: Physical Therapy Services are needed to assess and treat the following functional impairments: weakness, RLE pain.    Further, I certify that my clinical findings support that this patient is homebound (i.e. absences from home require considerable and taxing effort and are for medical reasons or [employer] services or infrequently or of short duration when for other reasons) because: patient in memory  care unit.    Based on the above findings. I certify that this patient is confined to the home and needs intermittent skilled nursing care, physical therapy and/or speech therapy.  The patient is under my care, and I have initiated the establishment of the plan of care.  This patient will be followed by a physician who will periodically review the plan of care.  Physician/Provider to provide follow up care: Davey Thornton    Attending hospital physician (the Medicare certified [place] provider): Dr. Mike Schmitt  Physician Signature: See electronic signature associated with these discharge orders.  Date: 2/3/2019     DNR/DNI     Fall precautions     Diet    Follow this diet upon discharge: Orders Placed This Encounter      Regular Diet Adult     Discharge Medications   Current Discharge Medication List      START taking these medications    Details   acetaminophen (TYLENOL) 500 MG tablet Take 2 tablets (1,000 mg) by mouth every 8 hours as needed for mild pain    Associated Diagnoses: Acute pain of lower extremity, right      lidocaine (LIDODERM) 5 % patch Place 1 patch onto the skin every 24 hours  Qty: 30 patch, Refills: 0    Associated Diagnoses: Acute pain of lower extremity, right      oxyCODONE (ROXICODONE) 5 MG tablet Take 0.5-1 tablets (2.5-5 mg) by mouth every 4 hours as needed for moderate to severe pain    Associated Diagnoses: Acute pain of lower extremity, right         CONTINUE these medications which have NOT CHANGED    Details   amLODIPine (NORVASC) 5 MG tablet TAKE 1 TABLET BY MOUTH ONCE DAILY  Qty: 30 tablet, Refills: 98    Associated Diagnoses: Essential hypertension      aspirin 81 MG EC tablet Take 1 tablet (81 mg) by mouth daily  Qty: 50 tablet, Refills: 1    Comments: Continue Lovenox for 4 more days then discontinue.  Re-start 81 mg daily aspirin at that time.  Associated Diagnoses: Closed fracture of neck of left femur, initial encounter (H)      Bisacodyl 10 MG/30ML ENEM Place 30 mLs (10  mg) rectally as needed  Qty: 30 mL, Refills: 3    Associated Diagnoses: Constipation, unspecified constipation type      Cranberry (THERACRAN ONE) 360 MG CAPS Take 1 capsule by mouth 2 times daily      glycerin, adult, 2 g SUPP Suppository Place 1 suppository rectally daily as needed for constipation  Qty: 15 suppository, Refills: 0    Associated Diagnoses: Constipation, unspecified constipation type      lisinopril (PRINIVIL/ZESTRIL) 10 MG tablet TAKE 1 TABLET BY MOUTH ONCE DAILY  Qty: 30 tablet, Refills: 11    Associated Diagnoses: Essential hypertension      loperamide (IMODIUM A-D) 2 MG tablet Take 2 tabs (4 mg) after first loose stool, and then take one tab (2 mg) after each diarrheal stool.  Max of 8 tabs (16 mg) per day.  Qty: 30 tablet, Refills: 0    Comments: Verbal order given to [facility] nurse Esther.  Associated Diagnoses: Antibiotic-associated diarrhea      PHENAZOPYRIDINE HCL PO Take 95 mg by mouth 2 times daily as needed for irritation       polyethylene glycol (MIRALAX/GLYCOLAX) Packet Take 17 g by mouth daily as needed       senna-docusate (SENOKOT-S;PERICOLACE) 8.6-50 MG per tablet Take 1 tablet by mouth daily       simvastatin (ZOCOR) 40 MG tablet TAKE 1 TABLET BY MOUTH AT BEDTIME  Qty: 30 tablet, Refills: 97    Associated Diagnoses: Hyperlipidemia LDL goal <70      VITAMIN D, CHOLECALCIFEROL, PO Take 2,000 Units by mouth daily      AMOXICILLIN PO Take 2,000 mg by mouth as needed (1 hour prior to dental apt.)           Allergies   Allergies   Allergen Reactions     Contrast Dye

## 2019-02-03 NOTE — PLAN OF CARE
Alert and oriented to self, confused and not reliable in communicating pain. Stated she has pain on rt arm and then right thigh and within almost same time denies pain No other significant past medical history or family history. Obvious sign of pain noted.Incontinent of B &B, requires Ax2 with turn and reposition and incontinent care. Urine culture result pending.I.V is s/l.SW and PT consulted.

## 2019-02-03 NOTE — PROGRESS NOTES
Alert to self, confused/forgetful. Assist x1 to BSC. Voiding. IV Sl. C/O pain at rt thigh with mobility. Scheduled Tylenol and Lidocaine path applied, effective. Son present at bed side. PT consulted, recommended memory home PT. Plan to discharge back to [facility] later today. Son to transfer patient once ready. Continue to monitor.

## 2019-02-03 NOTE — PROGRESS NOTES
"   02/03/19 1226   Quick Adds   Type of Visit Initial PT Evaluation   Living Environment   Lives With spouse   Living Arrangements extended care facility   Home Accessibility no concerns   Transportation Anticipated family or friend will provide   Living Environment Comment LTC, assist can increase, currently does not cook goes to meals in the dining area. Has assist from spouse for toileting per son.   Self-Care   Usual Activity Tolerance fair   Current Activity Tolerance fair   Regular Exercise No   Equipment Currently Used at Home walker, rolling   Activity/Exercise/Self-Care Comment Walks ~ 50 feet to and from meals, otherwise sits in a chair \"all day,\" per son.   Functional Level Prior   Ambulation 1-->assistive equipment   Transferring 1-->assistive equipment   Toileting 2-->assistive person   Bathing 3-->assistive equipment and person   Fall history within last six months yes   Number of times patient has fallen within last six months 1   Which of the above functional risks had a recent onset or change? (functional activity tolerance)   General Information   Onset of Illness/Injury or Date of Surgery - Date 02/02/19   Referring Physician Fiona Fox MD   Patient/Family Goals Statement None stated   Pertinent History of Current Problem (include personal factors and/or comorbidities that impact the POC) 89 yo female adm under OBS status secondary to R leg pain. Per H&P pt sustained a fall onto her R side about 10 days ago, did not have any difficulty until about 5 days ago, then contoinued R LE pain. Per notes at this point all imaging is (-) for pathology. PMH includes L hip pinning, dementia, frequent falls.   Precautions/Limitations fall precautions   Cognitive Status Examination   Orientation person;place   Level of Consciousness alert   Follows Commands and Answers Questions 100% of the time;able to follow single-step instructions   Personal Safety and Judgment intact   Cognitive Comment dementia " "at baseline   Pain Assessment   Patient Currently in Pain (Only with R LE extension and DF in sitting.)   Integumentary/Edema   Integumentary/Edema Comments B LE edema at ankles and lower legs   Posture    Posture Kyphosis;Protracted shoulders;Forward head position   Range of Motion (ROM)   ROM Comment Limited DF B, unable to fully extend the R knee in sitting secondary to pain in the R thigh \"down the leg.' Limited spinal roation,    Strength   Strength Comments Demonstrates antigravity strength with functional mobility, MMTs not assessed sedondary to leg pain and aprupt onset with AROM on luis R. Impaired activity tolerance.   Bed Mobility   Bed Mobility Comments Sup>sit CGA   Transfer Skills   Transfer Comments Sit<>stand CGA   Gait   Gait Comments CGA with ambulation using a WW, ambulated in the room 30' and chair to bathroom 40' wit hWW and CGA   Balance   Balance Comments Sitting balance fair + unable to reach the floor with feet, oft matress, SBA at all times. Standin gblaance, able to stand to try and manage clothing in bathroom, hwoever, gown in the way, pt perseveatinb over it, needs single hand on grab bar. Dunamic standing/gait needs B UE support for balance.   Sensory Examination   Sensory Perception Comments Deneis any n/t light touch intact with gross assessment.   General Therapy Interventions   Planned Therapy Interventions gait training;ROM;strengthening;stretching;transfer training   Clinical Impression   Criteria for Skilled Therapeutic Intervention yes, treatment indicated   PT Diagnosis Impaired Gait   Influenced by the following impairments Generalized weakness, fatigue, intermittant nepain in the R LE. Fallrisk   Functional limitations due to impairments Decreased functional independence   Clinical Presentation Stable/Uncomplicated   Clinical Presentation Rationale From LTC, has assist available, close to baseline, pain is better, good support system   Clinical Decision Making (Complexity) Low " "complexity   Predicted Duration of Therapy Intervention (days/wks) Eval and single treatment only   Anticipated Discharge Disposition Long Term Care Facility   Risk & Benefits of therapy have been explained Yes   Patient, Family & other staff in agreement with plan of care Yes   Clinical Impression Comments Pt to return to LTC with home PT trial for stretch/strength and improved mobility.   [employer] TM \"6 Clicks\"   2016, Trustees of [facility], under license to [employer].  All rights reserved.   6 Clicks Short Forms Basic Mobility Inpatient Short Form   [facility] AM-PAC  \"6 Clicks\" V.2 Basic Mobility Inpatient Short Form   1. Turning from your back to your side while in a flat bed without using bedrails? 3 - A Little   2. Moving from lying on your back to sitting on the side of a flat bed without using bedrails? 3 - A Little   3. Moving to and from a bed to a chair (including a wheelchair)? 3 - A Little   4. Standing up from a chair using your arms (e.g., wheelchair, or bedside chair)? 3 - A Little   5. To walk in hospital room? 3 - A Little   6. Climbing 3-5 steps with a railing? 2 - A Lot   Basic Mobility Raw Score (Score out of 24.Lower scores equate to lower levels of function) 17   Total Evaluation Time   Total Evaluation Time (Minutes) 15     "

## 2019-02-03 NOTE — PROGRESS NOTES
MD Notification    Notified Person: MD    Notified Person Name: Barthell PA    Notification Date/Time: 1820 2/2/19    Notification Interaction: on call answering service    Purpose of Notification: pt requesting advil    Orders Received: Poor kidney function, no NSAIDS at this point. PA will order low dose oxycodone.    Comments:

## 2019-02-03 NOTE — PLAN OF CARE
Patient has been discharged February 3, 2019 5:01 PM. Discharge instructions and education reviewed. Medication schedule and followup appointments discussed. Patient and son had no questions. All belongings sent home with patient and son.

## 2019-02-03 NOTE — PLAN OF CARE
Discharge Planner PT   Patient plan for discharge: Return to LTC at [place] with increased help as needed and Home PT trial.  Current status: Evaluation completed, treatment provided. 89 yo female adm under OBS status secondary to R leg pain. Per H&P pt sustained a fall onto her R side about 10 days ago, did not have any difficulty until about 5 days ago, then contoinued R LE pain. Per notes at this point all imaging is (-) for pathology. PMH includes L hip pinning, dementia, frequent falls.  Per pt's son the pt and her  reside in a memory care unit. They have limited mobility, the pt walks in the room with a 4WW and to meals ~ 50' x 2 daily. She is otherwise mainly sedentary. Son states he suspects that the pt's spouse assists the pt in the bathroom with clothing management.  At this time pt is oriented to self, is able to select from a few choices that she is in a hospital. Follows commands 100% of the time, but demonstrates delayed processing. Sup>Sit CGA wit single step cues for sequence and technique. Sit<>sand, CGA, again with cues for sequence and hand placement. Ambulated 100' with WW and CGA at gait belt, narrow DOROTEO, shuffling feet, trunk flexion. Per Son pt appears to be at or very near her baseline for mobility.   With further assessment pt reports pain with seated knee extension and ankle flexion on the R-suggestive of possible nerve issue. Denies n/t. Required Min A for clothing management with toileting.   Barriers to return to prior living situation: None pt resides in LTC  Recommendations for discharge: Return to LTC with home PT trial  Rationale for recommendations: Pt will benefit from home PT trial to work on improved strength and ROM of the R LE for improved comfort and activity tolerance with functional mobility. Pt son in agreement. Recommend supervision with ambulation to and from meals, as today the pt did not have pain with walking; however, should the R leg pain onset during  ambulation this would increase the pt's fall risk.       Entered by: Sandie Bobo 02/03/2019 12:17 PM

## 2019-02-05 NOTE — PROGRESS NOTES
[facility]  PRIMARY CARE PROVIDER AND CLINIC:  Davey Thornton [address] / [place] [address]  Chief Complaint   Patient presents with     Hospital F/U     [facility] Medical Record Number:  [id number]  Place of Service where encounter took place:  [facility] [[id number]]    HPI:    Aura Broderick is a 89 year old  (9/25/1926),admitted to the above facility from  [facility].  Hospital stay 2/2/19 through 2/3/19.  Admitted to this facility for  medical management and nursing care.  HPI information obtained from: facility chart records, patient report and [facility] chart review.      Hospital Course:    Overnight stay in observation care for acute right lower extremity pain and constipation. Workup of right leg was negative for acute injury.     ULTRASOUND VENOUS RIGHT LOWER EXTREMITY  2/2/2019 9:21 AM      IMPRESSION: No DVT demonstrated.     PELVIS AND RIGHT HIP ONE VIEW   2/2/2019 9:49 AM       IMPRESSION: No evidence of acute fracture or subluxation. Three   cannulated screws from previous ORIF left hip fracture. Joint spaces   appear well preserved    MRI LUMBAR SPINE WITHOUT CONTRAST   2/2/2019 1:51 PM       IMPRESSION:     1. At L3-L4 there is mild right foraminal stenosis. Mild to moderate   central stenosis.   2. At L4-L5 there is grade 1 degenerative spondylolisthesis. Moderate   central stenosis. Prominent left subarticular stenosis with contact   and probable compression of the descending left L5 nerve root.   3. At L5-S1 there is mild left foraminal stenosis.   4. No evidence of fracture or compression fracture.     CT HIP RIGHT WITHOUT CONTRAST 2/2/2019 4:16 PM       IMPRESSION:   1. No evidence of femoral neck fracture.   2. Degenerative changes of the right hip with subchondral sclerosis   and some subchondral lucency likely degenerative in nature. Doubt   subchondral minimal insufficiency fracture.     ABDOMEN TWO VIEWS   2/2/2019 9:50 AM       IMPRESSION: Moderate amount of stool in the colon could be related to   constipation. There are a few nondistended gas-filled loops of small   bowel. No evidence of obstruction. No free air. Vascular calcification   in the pelvis  ________________________________________________________-    Current issues are:        Personal history of urinary tract infection  ED 5/29/18 for condition and again 6/15/18 for ongoing abdominal/pelvic pain and bladder spasms. Has prn phenazopyridine (no recent use) and scheduled cranberry tabs. Urine culture 2/2/19 with >100,000 E-coli and antibiotic started. Reports some pain with urination.     Acute pain of lower extremity, right  PT stating she continues with much pain. Oxycodone 5 mg po q6H prn order as hospital discharge script not filled. Alternating ice/heat, prn Tylenol but will schedule for period. Seen today she is able to transfer sit to stand with SBA of 1 and walker, minimal indicators of pain. Passive ROM intact, some discomfort to palpation of right thigh.     Other constipation  History of constipation and will  forego bowel medications at times per 's direction. Concern with narcotic use for more issues.     CODE STATUS/ADVANCE DIRECTIVES DISCUSSION:   DNR / DNI  Patient's living condition: lives in an assisted living facility    ALLERGIES:Contrast dye  PAST MEDICAL HISTORY:  has a past medical history of Dizziness and giddiness, Essential hypertension, benign, Myocardial infarction, Other and unspecified hyperlipidemia, Palpitations, and Pure hypercholesterolemia.  PAST SURGICAL HISTORY:  has a past surgical history that includes NONSPECIFIC PROCEDURE; NONSPECIFIC PROCEDURE; NONSPECIFIC PROCEDURE (06-7-99); Coronary Angiography Adult Order (2008-[facility]); and Closed reduction, percutaneous pinning hip (Left, 3/16/2016).  FAMILY HISTORY: family history includes Cancer in her brother and mother; Heart Disease (age of onset: 83) in her  father.  SOCIAL HISTORY:  reports that she has quit smoking. She quit after 10.00 years of use. she has never used smokeless tobacco. She reports that she drinks alcohol. She reports that she does not use drugs.    Post Discharge Medication Reconciliation Status: discharge medications reconciled and changed, per note/orders (see AVS).  Current Outpatient Medications   Medication Sig Dispense Refill     acetaminophen (TYLENOL) 500 MG tablet Take 2 tablets (1,000 mg) by mouth every 8 hours as needed for mild pain       amLODIPine (NORVASC) 5 MG tablet TAKE 1 TABLET BY MOUTH ONCE DAILY 30 tablet 98     AMOXICILLIN PO Take 2,000 mg by mouth as needed (1 hour prior to dental apt.)       aspirin 81 MG EC tablet Take 1 tablet (81 mg) by mouth daily 50 tablet 1     Bisacodyl 10 MG/30ML ENEM Place 30 mLs (10 mg) rectally as needed 30 mL 3     Cranberry (THERACRAN ONE) 360 MG CAPS Take 1 capsule by mouth 2 times daily       glycerin, adult, 2 g SUPP Suppository Place 1 suppository rectally daily as needed for constipation 15 suppository 0     lidocaine (LIDODERM) 5 % patch Place 1 patch onto the skin every 24 hours 30 patch 0     lisinopril (PRINIVIL/ZESTRIL) 10 MG tablet TAKE 1 TABLET BY MOUTH ONCE DAILY 30 tablet 11     loperamide (IMODIUM A-D) 2 MG tablet Take 2 tabs (4 mg) after first loose stool, and then take one tab (2 mg) after each diarrheal stool.  Max of 8 tabs (16 mg) per day. 30 tablet 0     oxyCODONE (ROXICODONE) 5 MG tablet Take 1 tablet (5 mg) by mouth every 6 hours as needed for pain 20 tablet 0     PHENAZOPYRIDINE HCL PO Take 95 mg by mouth 2 times daily as needed for irritation        polyethylene glycol (MIRALAX/GLYCOLAX) Packet Take 17 g by mouth daily as needed        senna-docusate (SENOKOT-S;PERICOLACE) 8.6-50 MG per tablet Take 1 tablet by mouth daily And daily as needed.       simvastatin (ZOCOR) 40 MG tablet TAKE 1 TABLET BY MOUTH AT BEDTIME 30 tablet 97     sulfamethoxazole-trimethoprim (BACTRIM  DS/SEPTRA DS) 800-160 MG tablet Take 1 tablet by mouth 2 times daily       VITAMIN D, CHOLECALCIFEROL, PO Take 2,000 Units by mouth daily         ROS:  Limited secondary to cognitive impairment but today pt reports fine    Exam:  /82   Pulse 70   Temp 98.6  F (37  C)   Resp 18   Wt 55.3 kg (122 lb)   SpO2 97%   BMI 20.94 kg/m    GENERAL APPEARANCE:  Alert, in no distress  ENT:  Mouth and posterior oropharynx normal, dry mucous membranes  EYES:  EOM, conjunctivae, lids, pupils and irises normal-glasses  NECK:  No adenopathy,masses or thyromegaly  RESP:  respiratory effort and palpation of chest normal, diminished breath sounds throughout  CV:  Palpation and auscultation of heart done , regular rate and rhythm, no murmur, rub, or gallop, peripheral edema trace+ in legs, ankles and feet  ABDOMEN:  normal bowel sounds, slight distension    M/S: SBA with 1 and walker, sit to stand today tolerated, pedal pulses +2, ok ROM to LE  SKIN:  pale and dry to visible areas  PSYCH:  insight and judgement impaired    Lab/Diagnostic data:  CBC RESULTS:   Recent Labs   Lab Test 01/31/19  0715 06/15/18  1250   WBC 4.4 8.0   RBC 4.33 4.60   HGB 13.1 13.8   HCT 39.3 42.4   MCV 91 92   MCH 30.3 30.0   MCHC 33.3 32.5   RDW 13.1 13.5    251       Last Basic Metabolic Panel:  Recent Labs   Lab Test 01/31/19  0715 09/05/18  0615    143   POTASSIUM 3.8 4.0   CHLORIDE 109 106   LEFTY 8.3* 9.0   CO2 22 27   BUN 28 33*   CR 1.03 1.15*   GLC 81 79       Liver Function Studies -   Recent Labs   Lab Test 01/31/19  0715 06/15/18  1250   PROTTOTAL 5.9* 7.5   ALBUMIN 2.8* 3.5   BILITOTAL 0.3 0.5   ALKPHOS 57 81   AST 22 16   ALT 17 15       TSH   Date Value Ref Range Status   07/26/2013 1.640 mcU/mL Final   06/27/2012 1.190 mcU/mL Final       ASSESSMENT/PLAN:  (Z87.440) Personal history of urinary tract infection  (primary encounter diagnosis)  Comment: Current treatment for UTI  Plan:   -Cranberry tablet 1 capsule  BID  -Phenazopyridine prn BID   -Bactrim 400-80 mg tablet BID x 10 days  -Encourage po intake of fluids     (M79.604) Acute pain of lower extremity, right  Comment: Ongoing- was able to walk today  Plan:   -PT from [facility]  -Lidocaine 4% ordered- The 5% not here (needed PA)  -PRN Tylenol to scheduled 1,000 mg po BID and 500 mg po twice daily prn  -Oxycodone 5 mg po q6H prn  -Consult to [facility] for injections if pain continues    (K59.09) Other constipation  Comment: chronic and ongoing  Plan:   -Senna S increased to 2 tablet BID  -Miralax 17g po daily prn  -Bisacodyl enema and Glycerin suppository prn           Electronically signed by:  RODY Chaves CNP

## 2019-02-06 NOTE — LETTER
2/6/2019        RE: Aura Broderick  [facility]  [address]  [address]  [facility] [address]        [facility]  PRIMARY CARE PROVIDER AND CLINIC:  Davey Thornton [address] / [place] [address]  Chief Complaint   Patient presents with     Hospital F/U     [facility] Medical Record Number:  [id number]  Place of Service where encounter took place:  [facility] [[id number]]    HPI:    Aura Broderick is a 89 year old  (9/25/1926),admitted to the above facility from  [facility].  Hospital stay 2/2/19 through 2/3/19.  Admitted to this facility for  medical management and nursing care.  HPI information obtained from: facility chart records, patient report and [facility] chart review.      Hospital Course:    Overnight stay in observation care for acute right lower extremity pain and constipation. Workup of right leg was negative for acute injury.     ULTRASOUND VENOUS RIGHT LOWER EXTREMITY  2/2/2019 9:21 AM      IMPRESSION: No DVT demonstrated.     PELVIS AND RIGHT HIP ONE VIEW   2/2/2019 9:49 AM       IMPRESSION: No evidence of acute fracture or subluxation. Three   cannulated screws from previous ORIF left hip fracture. Joint spaces   appear well preserved    MRI LUMBAR SPINE WITHOUT CONTRAST   2/2/2019 1:51 PM       IMPRESSION:     1. At L3-L4 there is mild right foraminal stenosis. Mild to moderate   central stenosis.   2. At L4-L5 there is grade 1 degenerative spondylolisthesis. Moderate   central stenosis. Prominent left subarticular stenosis with contact   and probable compression of the descending left L5 nerve root.   3. At L5-S1 there is mild left foraminal stenosis.   4. No evidence of fracture or compression fracture.     CT HIP RIGHT WITHOUT CONTRAST 2/2/2019 4:16 PM       IMPRESSION:   1. No evidence of femoral neck fracture.   2. Degenerative changes of the right hip with subchondral sclerosis   and some  subchondral lucency likely degenerative in nature. Doubt   subchondral minimal insufficiency fracture.     ABDOMEN TWO VIEWS  2/2/2019 9:50 AM       IMPRESSION: Moderate amount of stool in the colon could be related to   constipation. There are a few nondistended gas-filled loops of small   bowel. No evidence of obstruction. No free air. Vascular calcification   in the pelvis  ________________________________________________________-    Current issues are:        Personal history of urinary tract infection  ED 5/29/18 for condition and again 6/15/18 for ongoing abdominal/pelvic pain and bladder spasms. Has prn phenazopyridine (no recent use) and scheduled cranberry tabs. Urine culture 2/2/19 with >100,000 E-coli and antibiotic started. Reports some pain with urination.     Acute pain of lower extremity, right  PT stating she continues with much pain. Oxycodone 5 mg po q6H prn order as hospital discharge script not filled. Alternating ice/heat, prn Tylenol but will schedule for period. Seen today she is able to transfer sit to stand with SBA of 1 and walker, minimal indicators of pain. Passive ROM intact, some discomfort to palpation of right thigh.     Other constipation  History of constipation and will  forego bowel medications at times per 's direction. Concern with narcotic use for more issues.     CODE STATUS/ADVANCE DIRECTIVES DISCUSSION:   DNR / DNI  Patient's living condition: lives in an assisted living facility    ALLERGIES:Contrast dye  PAST MEDICAL HISTORY:  has a past medical history of Dizziness and giddiness, Essential hypertension, benign, Myocardial infarction, Other and unspecified hyperlipidemia, Palpitations, and Pure hypercholesterolemia.  PAST SURGICAL HISTORY:  has a past surgical history that includes NONSPECIFIC PROCEDURE; NONSPECIFIC PROCEDURE; NONSPECIFIC PROCEDURE (06-7-99); Coronary Angiography Adult Order (2008-[facility]); and Closed reduction, percutaneous pinning hip (Left,  3/16/2016).  FAMILY HISTORY: family history includes Cancer in her brother and mother; Heart Disease (age of onset: 83) in her father.  SOCIAL HISTORY:  reports that she has quit smoking. She quit after 10.00 years of use. she has never used smokeless tobacco. She reports that she drinks alcohol. She reports that she does not use drugs.    Post Discharge Medication Reconciliation Status: discharge medications reconciled and changed, per note/orders (see AVS).  Current Outpatient Medications   Medication Sig Dispense Refill     acetaminophen (TYLENOL) 500 MG tablet Take 2 tablets (1,000 mg) by mouth every 8 hours as needed for mild pain       amLODIPine (NORVASC) 5 MG tablet TAKE 1 TABLET BY MOUTH ONCE DAILY 30 tablet 98     AMOXICILLIN PO Take 2,000 mg by mouth as needed (1 hour prior to dental apt.)       aspirin 81 MG EC tablet Take 1 tablet (81 mg) by mouth daily 50 tablet 1     Bisacodyl 10 MG/30ML ENEM Place 30 mLs (10 mg) rectally as needed 30 mL 3     Cranberry (THERACRAN ONE) 360 MG CAPS Take 1 capsule by mouth 2 times daily       glycerin, adult, 2 g SUPP Suppository Place 1 suppository rectally daily as needed for constipation 15 suppository 0     lidocaine (LIDODERM) 5 % patch Place 1 patch onto the skin every 24 hours 30 patch 0     lisinopril (PRINIVIL/ZESTRIL) 10 MG tablet TAKE 1 TABLET BY MOUTH ONCE DAILY 30 tablet 11     loperamide (IMODIUM A-D) 2 MG tablet Take 2 tabs (4 mg) after first loose stool, and then take one tab (2 mg) after each diarrheal stool.  Max of 8 tabs (16 mg) per day. 30 tablet 0     oxyCODONE (ROXICODONE) 5 MG tablet Take 1 tablet (5 mg) by mouth every 6 hours as needed for pain 20 tablet 0     PHENAZOPYRIDINE HCL PO Take 95 mg by mouth 2 times daily as needed for irritation        polyethylene glycol (MIRALAX/GLYCOLAX) Packet Take 17 g by mouth daily as needed        senna-docusate (SENOKOT-S;PERICOLACE) 8.6-50 MG per tablet Take 1 tablet by mouth daily And daily as needed.        simvastatin (ZOCOR) 40 MG tablet TAKE 1 TABLET BY MOUTH AT BEDTIME 30 tablet 97     sulfamethoxazole-trimethoprim (BACTRIM DS/SEPTRA DS) 800-160 MG tablet Take 1 tablet by mouth 2 times daily       VITAMIN D, CHOLECALCIFEROL, PO Take 2,000 Units by mouth daily         ROS:  Limited secondary to cognitive impairment but today pt reports fine    Exam:  /82   Pulse 70   Temp 98.6  F (37  C)   Resp 18   Wt 55.3 kg (122 lb)   SpO2 97%   BMI 20.94 kg/m     GENERAL APPEARANCE:  Alert, in no distress  ENT:  Mouth and posterior oropharynx normal, dry mucous membranes  EYES:  EOM, conjunctivae, lids, pupils and irises normal-glasses  NECK:  No adenopathy,masses or thyromegaly  RESP:  respiratory effort and palpation of chest normal, diminished breath sounds throughout  CV:  Palpation and auscultation of heart done , regular rate and rhythm, no murmur, rub, or gallop, peripheral edema trace+ in legs, ankles and feet  ABDOMEN:  normal bowel sounds, slight distension    M/S: SBA with 1 and walker, sit to stand today tolerated, pedal pulses +2, ok ROM to LE  SKIN:  pale and dry to visible areas  PSYCH:  insight and judgement impaired    Lab/Diagnostic data:  CBC RESULTS:   Recent Labs   Lab Test 01/31/19 0715 06/15/18  1250   WBC 4.4 8.0   RBC 4.33 4.60   HGB 13.1 13.8   HCT 39.3 42.4   MCV 91 92   MCH 30.3 30.0   MCHC 33.3 32.5   RDW 13.1 13.5    251       Last Basic Metabolic Panel:  Recent Labs   Lab Test 01/31/19 0715 09/05/18  0615    143   POTASSIUM 3.8 4.0   CHLORIDE 109 106   LEFTY 8.3* 9.0   CO2 22 27   BUN 28 33*   CR 1.03 1.15*   GLC 81 79       Liver Function Studies -   Recent Labs   Lab Test 01/31/19 0715 06/15/18  1250   PROTTOTAL 5.9* 7.5   ALBUMIN 2.8* 3.5   BILITOTAL 0.3 0.5   ALKPHOS 57 81   AST 22 16   ALT 17 15       TSH   Date Value Ref Range Status   07/26/2013 1.640 mcU/mL Final   06/27/2012 1.190 mcU/mL Final       ASSESSMENT/PLAN:  (Z87.440) Personal history of urinary tract  infection  (primary encounter diagnosis)  Comment: Current treatment for UTI  Plan:   -Cranberry tablet 1 capsule BID  -Phenazopyridine prn BID   -Bactrim 400-80 mg tablet BID x 10 days  -Encourage po intake of fluids     (M79.604) Acute pain of lower extremity, right  Comment: Ongoing- was able to walk today  Plan:   -PT from [facility]  -Lidocaine 4% ordered- The 5% not here (needed PA)  -PRN Tylenol to scheduled 1,000 mg po BID and 500 mg po twice daily prn  -Oxycodone 5 mg po q6H prn  -Consult to [facility] for injections if pain continues    (K59.09) Other constipation  Comment: chronic and ongoing  Plan:   -Senna S increased to 2 tablet BID  -Miralax 17g po daily prn  -Bisacodyl enema and Glycerin suppository prn           Electronically signed by:  RODY Chaves CNP                    Sincerely,        RODY Chaves CNP

## 2019-02-12 NOTE — TELEPHONE ENCOUNTER
Vitamin D      Last Written Prescription Date:  Patient reported  Last Fill Quantity: ,   # refills:   Last Office Visit: 06/18/18  Future Office visit:       Routing refill request to provider for review/approval because:  Drug not on the [facility] refill protocol or controlled substance    Shante Verdin MA

## 2019-02-13 NOTE — TELEPHONE ENCOUNTER
"Requested Prescriptions   Pending Prescriptions Disp Refills     Cholecalciferol (VITAMIN D3) 2000 units CAPS [Pharmacy Med Name: VITAMIN D3 CAP 2000UNIT]  97     Sig: TAKE 1 CAPSULE BY MOUTH ONCE DAILY    Vitamin Supplements (Adult) Protocol Passed - 2/12/2019  9:44 AM       Passed - High dose Vitamin D not ordered       Passed - Recent (12 mo) or future (30 days) visit within the authorizing provider's specialty    Patient had office visit in the last 12 months or has a visit in the next 30 days with authorizing provider or within the authorizing provider's specialty.  See \"Patient Info\" tab in inbasket, or \"Choose Columns\" in Meds & Orders section of the refill encounter.             Passed - Medication is active on med list        Routing refill request to provider for review/approval because:  Medication is reported/historical    Nolvia MARTINEZ RN    "

## 2019-02-18 NOTE — TELEPHONE ENCOUNTER
"Pending Prescriptions:                       Disp   Refills    ASPIRIN ADULT LOW STRENGTH 81 MG EC table*       98           Sig: TAKE 1 TABLET BY MOUTH ONCE DAILY    Last Written Prescription Date:  3/18/18  Last Fill Quantity: 50,  # refills: 1   Last office visit: 6/14/2018 with prescribing provider:     Future Office Visit:    Requested Prescriptions   Pending Prescriptions Disp Refills     ASPIRIN ADULT LOW STRENGTH 81 MG EC tablet [Pharmacy Med Name: ASPIRIN LOW TAB 81MG EC]  98     Sig: TAKE 1 TABLET BY MOUTH ONCE DAILY    Analgesics (Non-Narcotic Tylenol and ASA Only) Passed - 2/18/2019 11:15 AM       Passed - Recent (12 mo) or future (30 days) visit within the authorizing provider's specialty    Patient had office visit in the last 12 months or has a visit in the next 30 days with authorizing provider or within the authorizing provider's specialty.  See \"Patient Info\" tab in inbasket, or \"Choose Columns\" in Meds & Orders section of the refill encounter.             Passed - Patient is age 20 years or older    If ASA is flagged for ages under 20 years old. Forward to provider for confirmation Ryes Syndrome is not a concern.             Passed - Medication is active on med list          "

## 2019-03-13 NOTE — LETTER
3/13/2019        RE: Aura Broderick  [facility]  [address]  [address]  [facility] [address]        [facility]  [facility] Medical Record Number:  [id number]  Place of Service where encounter took place:  [facility] [[id number]]  Chief Complaint   Patient presents with     RECHECK       HPI:    Aura Broderick  is a 89 year old (9/25/1926), who is being seen today for an episodic care visit.  HPI information obtained from: facility staff and patient report.     Resident with ongoing complaints of dysuria, abdominal pain. PMH includes frequent UTI. UA grossly positive and given 1 gram of rocephin IM. UC with >100,000 pseudomonas aeruginosa sensitive to ciprofloxacin.       Past Medical and Surgical History reviewed in Epic today.    MEDICATIONS:  Current Outpatient Medications   Medication Sig Dispense Refill     acetaminophen (TYLENOL) 500 MG tablet Take 2 tablets (1,000 mg) by mouth 2 times daily. May also take 1 tablet (500 mg) 2 times daily as needed for pain. 50 tablet 11     amLODIPine (NORVASC) 5 MG tablet TAKE 1 TABLET BY MOUTH ONCE DAILY 30 tablet 98     AMOXICILLIN PO Take 2,000 mg by mouth as needed (1 hour prior to dental apt.)       ASPIRIN ADULT LOW STRENGTH 81 MG EC tablet TAKE 1 TABLET BY MOUTH ONCE DAILY 120 tablet 98     Bisacodyl 10 MG/30ML ENEM Place 30 mLs (10 mg) rectally as needed 30 mL 3     CIPROFLOX HCL-CIPRO BETAINE 500 MG TB24 Take 500 mg by mouth daily for 7 days 14 tablet 0     Cranberry (THERACRAN ONE) 360 MG CAPS Take 1 capsule by mouth 2 times daily       glycerin, adult, 2 g SUPP Suppository Place 1 suppository rectally daily as needed for constipation 15 suppository 0     Lidocaine (ASPERCREME LIDOCAINE) 4 % Patch Place 1 patch onto the skin every 24 hours To affected area 90 patch 3     lisinopril (PRINIVIL/ZESTRIL) 10 MG tablet TAKE 1 TABLET BY MOUTH ONCE DAILY 30 tablet 11     loperamide (IMODIUM A-D) 2 MG tablet  Take 2 tabs (4 mg) after first loose stool, and then take one tab (2 mg) after each diarrheal stool.  Max of 8 tabs (16 mg) per day. 30 tablet 0     oxyCODONE (OXY-IR) 5 MG capsule Take 5 mg by mouth every 4 hours as needed for severe pain       polyethylene glycol (MIRALAX/GLYCOLAX) Packet Take 17 g by mouth daily as needed        senna-docusate (SENOKOT-S;PERICOLACE) 8.6-50 MG per tablet Take 2 tablets by mouth 2 times daily       simvastatin (ZOCOR) 40 MG tablet TAKE 1 TABLET BY MOUTH AT BEDTIME 30 tablet 97     VITAMIN D, CHOLECALCIFEROL, PO Take 2,000 Units by mouth daily       Cholecalciferol (VITAMIN D3) 2000 units CAPS TAKE 1 CAPSULE BY MOUTH ONCE DAILY 100 capsule 0     PHENAZOPYRIDINE HCL PO Take 95 mg by mouth 2 times daily as needed for irritation        sulfamethoxazole-trimethoprim (BACTRIM/SEPTRA) 400-80 MG tablet Take 1 tablet by mouth 2 times daily 20 tablet 0         REVIEW OF SYSTEMS:  Limited secondary to cognitive impairment but today pt reports I have a UTI    Objective:  /83   Pulse 84   Temp 99.8  F (37.7  C)   Resp 16   SpO2 98%   Exam:  GENERAL APPEARANCE:  Alert,  appears worried  ENT:  Mouth and posterior oropharynx normal, dry mucous membranes  EYES:  EOM, conjunctivae, lids, pupils and irises normal-glasses  RESP:  respiratory effort and palpation of chest normal, diminished breath sounds throughout-nasal congestion   CV:  Palpation and auscultation of heart done , regular rate and rhythm, no murmur, rub, or gallop, peripheral edema trace+ in legs, ankles and feet  ABDOMEN:  normal bowel sounds, soft, nontender  M/S:   Gait and station at baseline with walker  SKIN:  pale and dry to visible areas   PSYCH:  insight and judgement impaired    Labs:   Recent labs in [facility] reviewed by me today.      Estimated Creatinine Clearance: 21.6 mL/min (A) (based on SCr of 1.45 mg/dL (H)).    ASSESSMENT/PLAN:  (N30.00) Acute cystitis without hematuria  (primary encounter diagnosis)  Comment:  Acute  Plan:   -CIPROFLOX HCL-CIPRO BETAINE 500 MG TB24 not available so switching to immediate release 250 mg po BID x 7 days (renal dosed)                     Electronically signed by:  RODY Chaves CNP                   Sincerely,        RODY Chaves CNP

## 2019-03-13 NOTE — PROGRESS NOTES
[facility]  [place] Medical Record Number:  [id number]  Place of Service where encounter took place:  [facility] [[id number]]  Chief Complaint   Patient presents with     RECHECK       HPI:    Aura Broderick  is a 89 year old (9/25/1926), who is being seen today for an episodic care visit.  HPI information obtained from: facility staff and patient report.     Resident with ongoing complaints of dysuria, abdominal pain. PMH includes frequent UTI. UA grossly positive and given 1 gram of rocephin IM. UC with >100,000 pseudomonas aeruginosa sensitive to ciprofloxacin.       Past Medical and Surgical History reviewed in Epic today.    MEDICATIONS:  Current Outpatient Medications   Medication Sig Dispense Refill     acetaminophen (TYLENOL) 500 MG tablet Take 2 tablets (1,000 mg) by mouth 2 times daily. May also take 1 tablet (500 mg) 2 times daily as needed for pain. 50 tablet 11     amLODIPine (NORVASC) 5 MG tablet TAKE 1 TABLET BY MOUTH ONCE DAILY 30 tablet 98     AMOXICILLIN PO Take 2,000 mg by mouth as needed (1 hour prior to dental apt.)       ASPIRIN ADULT LOW STRENGTH 81 MG EC tablet TAKE 1 TABLET BY MOUTH ONCE DAILY 120 tablet 98     Bisacodyl 10 MG/30ML ENEM Place 30 mLs (10 mg) rectally as needed 30 mL 3     CIPROFLOX HCL-CIPRO BETAINE 500 MG TB24 Take 500 mg by mouth daily for 7 days 14 tablet 0     Cranberry (THERACRAN ONE) 360 MG CAPS Take 1 capsule by mouth 2 times daily       glycerin, adult, 2 g SUPP Suppository Place 1 suppository rectally daily as needed for constipation 15 suppository 0     Lidocaine (ASPERCREME LIDOCAINE) 4 % Patch Place 1 patch onto the skin every 24 hours To affected area 90 patch 3     lisinopril (PRINIVIL/ZESTRIL) 10 MG tablet TAKE 1 TABLET BY MOUTH ONCE DAILY 30 tablet 11     loperamide (IMODIUM A-D) 2 MG tablet Take 2 tabs (4 mg) after first loose stool, and then take one tab (2 mg) after each diarrheal stool.  Max of 8 tabs (16  mg) per day. 30 tablet 0     oxyCODONE (OXY-IR) 5 MG capsule Take 5 mg by mouth every 4 hours as needed for severe pain       polyethylene glycol (MIRALAX/GLYCOLAX) Packet Take 17 g by mouth daily as needed        senna-docusate (SENOKOT-S;PERICOLACE) 8.6-50 MG per tablet Take 2 tablets by mouth 2 times daily       simvastatin (ZOCOR) 40 MG tablet TAKE 1 TABLET BY MOUTH AT BEDTIME 30 tablet 97     VITAMIN D, CHOLECALCIFEROL, PO Take 2,000 Units by mouth daily       Cholecalciferol (VITAMIN D3) 2000 units CAPS TAKE 1 CAPSULE BY MOUTH ONCE DAILY 100 capsule 0     PHENAZOPYRIDINE HCL PO Take 95 mg by mouth 2 times daily as needed for irritation        sulfamethoxazole-trimethoprim (BACTRIM/SEPTRA) 400-80 MG tablet Take 1 tablet by mouth 2 times daily 20 tablet 0         REVIEW OF SYSTEMS:  Limited secondary to cognitive impairment but today pt reports I have a UTI    Objective:  /83   Pulse 84   Temp 99.8  F (37.7  C)   Resp 16   SpO2 98%   Exam:  GENERAL APPEARANCE:  Alert, appears worried  ENT:  Mouth and posterior oropharynx normal, dry mucous membranes  EYES:  EOM, conjunctivae, lids, pupils and irises normal-glasses  RESP:  respiratory effort and palpation of chest normal, diminished breath sounds throughout-nasal congestion   CV:  Palpation and auscultation of heart done , regular rate and rhythm, no murmur, rub, or gallop, peripheral edema trace+ in legs, ankles and feet  ABDOMEN:  normal bowel sounds, soft, nontender  M/S:   Gait and station at baseline with walker  SKIN:  pale and dry to visible areas   PSYCH:  insight and judgement impaired    Labs:   Recent labs in [facility] reviewed by me today.      Estimated Creatinine Clearance: 21.6 mL/min (A) (based on SCr of 1.45 mg/dL (H)).    ASSESSMENT/PLAN:  (N30.00) Acute cystitis without hematuria  (primary encounter diagnosis)  Comment: Acute  Plan:   -CIPROFLOX HCL-CIPRO BETAINE 500 MG TB24 not available so switching to immediate release 250 mg po BID x 7  days (renal dosed)                     Electronically signed by:  RODY Chaves CNP

## 2019-04-30 PROBLEM — S72.90XA FEMORAL FRACTURE (H): Status: ACTIVE | Noted: 2019-01-01

## 2019-04-30 PROBLEM — I12.9 BENIGN HYPERTENSION WITH CKD (CHRONIC KIDNEY DISEASE) STAGE III (H): Status: ACTIVE | Noted: 2018-01-01

## 2019-04-30 PROBLEM — G30.1 LATE ONSET ALZHEIMER'S DISEASE WITHOUT BEHAVIORAL DISTURBANCE (H): Status: ACTIVE | Noted: 2018-01-01

## 2019-04-30 PROBLEM — F02.80 LATE ONSET ALZHEIMER'S DISEASE WITHOUT BEHAVIORAL DISTURBANCE (H): Status: ACTIVE | Noted: 2018-01-01

## 2019-04-30 PROBLEM — N18.30 BENIGN HYPERTENSION WITH CKD (CHRONIC KIDNEY DISEASE) STAGE III (H): Status: ACTIVE | Noted: 2018-01-01

## 2019-04-30 NOTE — ED PROVIDER NOTES
History     Chief Complaint:  Fall    HPI   Aura Broderick is a 89 year old female with a history of late onset alzheimer's disease, who presents to the ED for the evaluation of fall. The patient reports that earlier this afternoon she was walking with her walker, tripped, and fell, injuring her right hip, prompting her to the ED. The patient describes that she is currently experiencing pain in both of her hips and her upper right leg. The patient denies pain in her knees or ankles, syncope, hitting her head, neck pain, back pain, chest pain, and abdominal pain. Of note, the patient comes from an assisted living care facility and the nursing staff their reports that the patient fell out of bed rather than while walking.  Given this discrepancy, history may be limited.      Allergies:  Contrast dye     Medications:    Norvasc  Amoxicillin  Prinivil  Imodium  Phenazopyridine  Zocor    Past Medical History:    Dizziness and giddiness  Hypertension  Myocardial infarction  Hyperlipidemia  Palpitations  Hypercholesterolemia  Bacteremia  Alzheimer's disease    Past Surgical History:    History reviewed. No pertinent surgical history.     Family History:    Cancer  Heart disease    Social History:  Smoking status: Former Smoker for ten years  Alcohol use: Rare   Marital Status:   [2]     Review of Systems   Cardiovascular: Negative for chest pain.   Gastrointestinal: Negative for abdominal pain.   Musculoskeletal: Positive for arthralgias and myalgias. Negative for back pain and neck pain.   Neurological: Negative for syncope.   All other systems reviewed and are negative.      Physical Exam     Patient Vitals for the past 24 hrs:   BP Temp Temp src Pulse Heart Rate Resp SpO2   04/30/19 1600 162/73 -- -- 89 -- -- 94 %   04/30/19 1530 169/83 -- -- 96 -- -- 95 %   04/30/19 1500 175/82 -- -- 89 -- -- 93 %   04/30/19 1430 165/74 -- -- 87 -- -- 96 %   04/30/19 1408 167/75 97.7  F (36.5  C) Oral 92 92 18 95 %         Physical Exam  Head:               The scalp, face, and head appear normal without trauma  Eyes:               Sclera white; Pupils are equal and round  Neck:               No midline tenderness or pain with full ROM.  CV:                  Rate as above with regular rhythm   Chest Wall:      No tenderness  Resp:               Breath sounds clear and equal bilaterally                          Non-labored, no retractions or accessory muscle use  GI:                   Abdomen is soft, non-tender, non-distended                          No rebound tenderness or peritoneal features  Back:               No midline tenderness or step-off  MS:                  BUE: No deformity, no tenderness, good ROM                          LLE: Active ROM good at hip, knee, ankle but states pain in hip and thigh                          RLE: Poor active ROM at hip due to pain.  Tenderness mid thigh through hip.                          Pelvis stable.                          DP pulses strong bilaterally, moving toes  Skin:                Small abrasion over R patella  Neuro:             Speech is normal.  Following commands.    Emergency Department Course   ECG (15:33:30):  Rate 92 bpm. AL interval 262. QRS duration 72. QT/QTc 344/425. P-R-T axes 79 71 63. Sinus rhythm with 1st degree AV block. Possible Anterior infarct, age undetermined. Abnormal ECG. No significant change compared to ECG dated 5/29/2018.  Interpreted at 1545 by Hallie Terrazas MD.     Imaging:  Radiographic findings were communicated with the patient who voiced understanding of the findings.  XR Femur Bilateral 2 Views  IMPRESSION:   Left femur: There are 3 screws across the left femoral neck. No acute  fracture or dislocation. Arterial calcifications noted.  Right femur: There is a minimally displaced right femoral neck  fracture. Mild arterial calcifications noted.  As read by Radiology.    Laboratory:  BMP: Creatinine 1.10 (H), Glucose 120 (H), GFR 43  (L)  CBC: WNL (WBC 6.5, HGB 12.8, )    Interventions:  1527, Sublimaze, 25 mcg, IV    Emergency Department Course:  Patient arrived via ambulance.     Past medical records, nursing notes, and vitals reviewed.  1424: I performed an exam of the patient and obtained history, as documented above.    IV inserted and blood drawn.    The patient was sent for a bilateral femur x ray while in the emergency department, findings above.    1519: I spoke with [facility] PA.    1534: Discussed the patient with Dr. Monroy, who will admit the patient to a medical bed for further monitoring, evaluation, and treatment. Findings and plan explained to the Patient who consents to admission.      1534: I spoke to Dr. Monroy of the hospitalist service who accepts the patient for admission.      1555: I spoke with the patient's family in the room and updated them on the patient's status.    Impression & Plan    Medical Decision Making:  Patient is here for evaluation of hip pain after a fall. History varies a little on what she was doing when she fell and that may be do to her underlying memory issues, but there was no report of head injury and no evidence of head injury on exam. Areas of pain were imaged, consistent with a right femoral neck fracture. She has had a previous repair of a left femoral neck fracture which does not appear to be displaced or dislocated. Blood work returned without acute abnormalities that would have contributed to a fall. EKG for pre-op screening was obtained and demonstrates no ischemia or dysrhythmia. Family was in the room and was updated. Jose J from Orthopedics is aware of the patient and saw her at bedside as well.     Diagnosis:    ICD-10-CM    1. Closed fracture of neck of right femur, initial encounter (H) S72.001A Basic metabolic panel   2. Fall from standing, initial encounter W19.XXXA    3. Bilateral thigh pain M79.651     M79.652        Disposition:  Admitted to medical bed.      Scribe Disclosure:  I, Jerod Hernandez, am serving as a scribe at 2:24 PM on 4/30/2019 to document services personally performed by Hallie Terrazas MD based on my observations and the provider's statements to me.      Jerod Hernandez  4/30/2019    EMERGENCY DEPARTMENT       Hallie Terrazas MD  04/30/19 1903

## 2019-04-30 NOTE — PROGRESS NOTES
RECEIVING UNIT ED HANDOFF REVIEW    ED Nurse Handoff Report was reviewed by: Jing King on April 30, 2019 at 5:01 PM

## 2019-04-30 NOTE — ED NOTES
"[facility]  ED Nurse Handoff Report    ED Chief complaint: Fall      ED Diagnosis:   Final diagnoses:   Closed fracture of neck of right femur, initial encounter (H)   Fall from standing, initial encounter   Bilateral thigh pain       Code Status: DNR    Allergies:   Allergies   Allergen Reactions     Contrast Dye        Activity level - Baseline/Home:  Ambulates with walker    Activity Level - Current:   Total Care     Needed?: No    Isolation: No  Infection: Not Applicable  Bariatric?: No    Vital Signs:   Vitals:    04/30/19 1408 04/30/19 1430 04/30/19 1500   BP: 167/75 165/74 175/82   Pulse: 92 87 89   Resp: 18     Temp: 97.7  F (36.5  C)     TempSrc: Oral     SpO2: 95% 96% 93%       Pain level: 0-10 Pain Scale: 2    Is this patient confused?: Yes   Does this patient have a guardian?  No         If yes, is there guardianship documents in the Epic \"Code/ACP\" activity?  N/A         Guardian Notified?  N/A  [place] - Suicide Severity Rating Scale Completed?  Yes  If yes, what color did the patient score?  White    Patient Report: 89yo female. Presents to ED from [facility] s/p fall while ambulating with walker this afternoon. She does have a hx of alzheimer's disease, hence one of the reasons why she lives in [facility]. CBC unremarkable. BMP pending. XR showed minimally displaced RIGHT femoral neck fracture (with prior hardware to LEFT femoral neck from old fracture). Planning for surgery tomorrow at earliest.    Family Comments: Daughter at bedside    OBS brochure/video discussed/provided to patient/family: N/A              Name of person given brochure if not patient: N/A              Relationship to patient: N/A    ED Medications:   Medications   fentaNYL (PF) (SUBLIMAZE) injection 25 mcg (0 mcg Intravenous Hold 4/30/19 1736)       Drips infusing?:  No    For the majority of the shift this patient was Green.   Interventions performed were N/A.    Severe Sepsis OR Septic Shock Diagnosis Present: " No    To be done/followed up on inpatient unit:  Continue cares.    ED NURSE PHONE NUMBER: [phone]

## 2019-04-30 NOTE — H&P
[facility]    History and Physical - Hospitalist Service       Date of Admission:  4/30/2019    Assessment & Plan      Aura Broderick is a 89 year old female with hx of dementia, CAD s/p PCI, HTN, CKD, recurrent UTIs, and recent fall who presents from her memory care unit at [place] for evaluation for evaluation right hip pain.      Rip Hip Pain  Right femoral neck fracture.  Assessment: presents with right hip pain following mechanical fall.XR femur shows a minimally displaced right femoral neck fracture. Orthopedic surgery plans for surgical intervention. EKG shows SR with 1st degree AVB. No angina symptoms, no pulmonary disease Medically cleared for surgery tomorrow.   Plan:  - admit to inpatient  - Orthopedic surgery consult  - Start Tylenol 1000 mg TID and lidocaine patch to right thigh  - Opioids were offered and declined by the patient  - PT/OT/SW consult  - NPO at midnight, IVF while NPO     Constipation  - Start senna-docusate #1-2 BID and Miralax daily  - PRN stool softeners available     CAD, native heart, native vessels s/p PCI  Essential hypertension  - Resume PTA amlodipine and lisinopril when reconciled by PharmD  - Resume PTA simvastatin  - Hold ASA tonight for surgery tomorrow     CKD stage III  Creatinine stable within baseline 1-1.3     Dementia without behavioral disturbance  delirium protocol       Diet: NPO at midnight  DVT Prophylaxis: Pneumatic Compression Devices  Aparicio Catheter: not present  Code Status: FULL CODE    Disposition Plan   Expected discharge: 2 - 3 days, recommended to transitional care unit once orthopedic evaluation completed.  Entered: Ruel Monroy MD 04/30/2019, 4:04 PM     The patient's care was discussed with the Patient and ED provider.    Ruel Monroy MD  [facility]    ______________________________________________________________________    Chief Complaint     Right Hip Pain  Mechanical Fall    History is obtained from the  patient    History of Present Illness      Aura Broderick is a 89 year old female with hx of dementia, CAD s/p PCI, HTN, CKD, recurrent UTIs, and recent fall who presents from her memory care unit at [place] for evaluation for evaluation right hip pain.     Per patient, she was ambulating with her walker today when she tripped and fell onto her right hip, she can have severe pain, inability to move her right lower extremity, thus staff at her memory center activated EMS for evaluation at the emergency department.  Nursing staff reports that patient actually fell out of bed.  She does have dementia at baseline.  Patient denied any chest pain she has shortness of breath/syncopal episodes. She denies any recent fever/chills, cough.  She denies any nausea/vomiting or abdominal pain.  She denies any urinary complaints of dysuria or frequency or urgency.  Denies any recent diarrhea, blood in her stool, weight loss or night sweats.  No new medications.  She denies any dizziness or lightheadedness.  She has no other complaints this time    Review of Systems    The 10 point Review of Systems is negative other than noted in the HPI or here.     Past Medical History    I have reviewed this patient's medical history and updated it with pertinent information if needed.   Past Medical History:   Diagnosis Date     Dizziness and giddiness      Essential hypertension, benign      Myocardial infarction     LAD - stent - Katynadia Beckham     Other and unspecified hyperlipidemia      Palpitations      Pure hypercholesterolemia        Past Surgical History   I have reviewed this patient's surgical history and updated it with pertinent information if needed.  Past Surgical History:   Procedure Laterality Date     C NONSPECIFIC PROCEDURE      tonsillectomy     C NONSPECIFIC PROCEDURE      D&C     C NONSPECIFIC PROCEDURE  06-7-99    COLONOSCOPY      CLOSED REDUCTION, PERCUTANEOUS PINNING HIP Left 3/16/2016    Procedure: CLOSED  REDUCTION, PERCUTANEOUS PINNING HIP;  Surgeon: Sukh Kebede MD;  Location: [facility] OR     CORONARY ANGIOGRAPHY ADULT ORDER  2008-[facility]    stent       Social History   I have reviewed this patient's social history and updated it with pertinent information if needed.  Social History     Tobacco Use     Smoking status: Former Smoker     Years: 10.00     Smokeless tobacco: Never Used   Substance Use Topics     Alcohol use: Yes     Alcohol/week: 0.0 oz     Comment: rare     Drug use: No       Family History   I have reviewed this patient's family history and updated it with pertinent information if needed.   Family History   Problem Relation Age of Onset     Cancer Mother         pancreatic     Heart Disease Father 83        MI     Cancer Brother         renal cell     Prior to Admission Medications   Prior to Admission Medications   Prescriptions Last Dose Informant Patient Reported? Taking?   AMOXICILLIN PO  at [facility] Yes Yes   Sig: Take 2,000 mg by mouth as needed (1 hour prior to dental apt.)   ASPERCREME LIDOCAINE 4 % Patch  at [facility] No Yes   Sig: APPLY 1 PATCH TO LEFT LEG ONCE DAILY ( ON IN THE MORNING AND OFF AT BEDTIME )   ASPIRIN ADULT LOW STRENGTH 81 MG EC tablet 4/30/2019 at [facility] No Yes   Sig: TAKE 1 TABLET BY MOUTH ONCE DAILY   Bisacodyl 10 MG/30ML ENEM  at [facility] No Yes   Sig: Place 30 mLs (10 mg) rectally as needed   Cranberry (THERACRAN ONE) 360 MG CAPS 4/30/2019 at [facility] Yes Yes   Sig: Take 1 capsule by mouth 2 times daily   PHENAZOPYRIDINE HCL PO  at [facility] Yes Yes   Sig: Take 95 mg by mouth 2 times daily as needed for irritation    VITAMIN D, CHOLECALCIFEROL, PO 4/30/2019 at [facility] Yes Yes   Sig: Take 2,000 Units by mouth daily   acetaminophen (TYLENOL) 500 MG tablet  at [facility] No Yes   Sig: Take 2 tablets (1,000 mg) by mouth 2 times daily. May also take 1 tablet (500 mg) 2 times daily as needed for pain.   amLODIPine  (NORVASC) 5 MG tablet 4/30/2019 at [facility] No Yes   Sig: TAKE 1 TABLET BY MOUTH ONCE DAILY   glycerin, adult, 2 g SUPP Suppository  at [facility] No Yes   Sig: Place 1 suppository rectally daily as needed for constipation   lisinopril (PRINIVIL/ZESTRIL) 10 MG tablet 4/30/2019 at [facility] No Yes   Sig: TAKE 1 TABLET BY MOUTH ONCE DAILY   loperamide (IMODIUM A-D) 2 MG tablet  at [facility] No Yes   Sig: Take 2 tabs (4 mg) after first loose stool, and then take one tab (2 mg) after each diarrheal stool.  Max of 8 tabs (16 mg) per day.   oxyCODONE (OXY-IR) 5 MG capsule  at [facility] Yes Yes   Sig: Take 5 mg by mouth every 4 hours as needed for severe pain   polyethylene glycol (MIRALAX/GLYCOLAX) Packet  at [facility] Yes Yes   Sig: Take 17 g by mouth daily as needed    senna-docusate (SENOKOT-S/PERICOLACE) 8.6-50 MG tablet 4/30/2019 at [facility] Yes Yes   Sig: Take 1 tablet by mouth daily   senna-docusate (SENOKOT-S/PERICOLACE) 8.6-50 MG tablet  at [facility] Yes Yes   Sig: Take 1 tablet by mouth daily as needed for constipation   simvastatin (ZOCOR) 40 MG tablet 4/29/2019 at  Nursing Home No Yes   Sig: TAKE 1 TABLET BY MOUTH AT BEDTIME      Facility-Administered Medications: None     Allergies   Allergies   Allergen Reactions     Contrast Dye        Physical Exam   Vital Signs: Temp: 97.7  F (36.5  C) Temp src: Oral BP: 175/82 Pulse: 89 Heart Rate: 92 Resp: 18 SpO2: 93 % O2 Device: None (Room air)    Weight: 0 lbs 0 oz    Constitutional: Appears comfortable, NAD  HEENT: NC/AT, sclera white, conjunctiva clear, EOMI, MMM  Respiratory: Breathing non-labored. Lungs CTAB - no wheezes/crackles/rhonchi  Cardiovascular: Heart RRR, no m/r/g. No pedal edema.   GI: +BS. Abd soft/NT  Lymph/Hematologic: No cervical LAD  Skin: Warm and dry. No rash.  Musculoskeletal: Normal muscle bulk and tone. Mild TTP over anterior right thigh with no effusion or hematoma , ROM limited  secondary to pain.  Neurologic: Alert and appropriate, oriented to self and place only. SHIN  Psychiatric: Calm and cooperative      Data   Data reviewed today: I reviewed all medications, new labs and imaging results over the last 24 hours. I personally reviewed the Femure XR image(s) showing see below.                                                 IMPRESSION:   Left femur: There are 3 screws across the left femoral neck. No acute  fracture or dislocation. Arterial calcifications noted.  Right femur: There is a minimally displaced right femoral neck  fracture. Mild arterial calcifications noted.       Most Recent 3 CBC's:  Recent Labs   Lab Test 04/30/19  1430 01/31/19  0715 06/15/18  1250   WBC 6.5 4.4 8.0   HGB 12.8 13.1 13.8   MCV 96 91 92    224 251     Most Recent 3 BMP's:  Recent Labs   Lab Test 04/30/19  1508 04/30/19  1430 02/13/19  0700    Canceled, Test credited, specimen discarded 139   POTASSIUM 4.3 Canceled, Test credited, specimen discarded 4.7   CHLORIDE 103 Canceled, Test credited, specimen discarded 109   CO2 28 Canceled, Test credited, specimen discarded 23   BUN 30 Canceled, Test credited, specimen discarded 32*   CR 1.10* Canceled, Test credited, specimen discarded 1.45*   ANIONGAP 8 Canceled, Test credited, specimen discarded 7   LEFTY 9.4 Canceled, Test credited, specimen discarded 8.6   * Canceled, Test credited, specimen discarded 82     Most Recent 2 LFT's:  Recent Labs   Lab Test 01/31/19  0715 06/15/18  1250   AST 22 16   ALT 17 15   ALKPHOS 57 81   BILITOTAL 0.3 0.5     Most Recent 3 INR's:  Recent Labs   Lab Test 03/16/16  0607 03/15/16  1622   INR 1.03 0.93     Recent Results (from the past 24 hour(s))   XR Femur Bilateral 2 Views    Narrative    XR BILATERAL FEMUR TWO VIEWS   4/30/2019 3:02 PM     HISTORY: Fell, pain.    COMPARISON: None.      Impression    IMPRESSION:   Left femur: There are 3 screws across the left femoral neck. No acute  fracture or dislocation.  Arterial calcifications noted.  Right femur: There is a minimally displaced right femoral neck  fracture. Mild arterial calcifications noted.    CHONG TILLEY MD

## 2019-04-30 NOTE — ED NOTES
Bed: ED04  Expected date:   Expected time:   Means of arrival:   Comments:  rajesh 2 fall hip pain 89 f

## 2019-04-30 NOTE — PHARMACY-ADMISSION MEDICATION HISTORY
Admission Medication History    Admission medication history interview status for the 4/30/2019 admission is complete. See EPIC admission navigator for prior to admission medications     Medication history source reliability:Good    Actions taken by pharmacist (provider contacted, etc):None     Additional medication history information not noted on PTA med list :  Emy 9-6 [phone]  ON-CALL [phone]     Medication reconciliation/reorder completed by provider prior to medication history? No    Time spent in this activity: 15 minutes    Prior to Admission medications    Medication Sig Last Dose Taking? Auth Provider   acetaminophen (TYLENOL) 500 MG tablet Take 2 tablets (1,000 mg) by mouth 2 times daily. May also take 1 tablet (500 mg) 2 times daily as needed for pain.  at PRN Yes Davey Thornton APRN CNP   amLODIPine (NORVASC) 5 MG tablet TAKE 1 TABLET BY MOUTH ONCE DAILY 4/30/2019 at AM Yes Alice Neff APRN CNP   AMOXICILLIN PO Take 2,000 mg by mouth as needed (1 hour prior to dental apt.)  at Ppx Yes Reported, Patient   ASPERCREME LIDOCAINE 4 % Patch APPLY 1 PATCH TO LEFT LEG ONCE DAILY ( ON IN THE MORNING AND OFF AT BEDTIME )  at PRN Yes Davey Thornton APRN CNP   ASPIRIN ADULT LOW STRENGTH 81 MG EC tablet TAKE 1 TABLET BY MOUTH ONCE DAILY 4/30/2019 at AM Yes Davey Thornton APRN CNP   Bisacodyl 10 MG/30ML ENEM Place 30 mLs (10 mg) rectally as needed  at PRN Yes Mian Ramos MD   Cranberry (THERACRAN ONE) 360 MG CAPS Take 1 capsule by mouth 2 times daily 4/30/2019 at AM Yes Reported, Patient   glycerin, adult, 2 g SUPP Suppository Place 1 suppository rectally daily as needed for constipation  at PRN Yes Mian Ramos MD   lisinopril (PRINIVIL/ZESTRIL) 10 MG tablet TAKE 1 TABLET BY MOUTH ONCE DAILY 4/30/2019 at AM Yes Davey Thornton APRN CNP   loperamide (IMODIUM A-D) 2 MG tablet Take 2 tabs (4 mg) after first loose stool, and then take one tab (2  mg) after each diarrheal stool.  Max of 8 tabs (16 mg) per day.  at PRN Yes Cordell Quinn APRN CNP   oxyCODONE (OXY-IR) 5 MG capsule Take 5 mg by mouth every 4 hours as needed for severe pain  at PRN Yes Reported, Patient   PHENAZOPYRIDINE HCL PO Take 95 mg by mouth 2 times daily as needed for irritation   at PRN Yes Reported, Patient   polyethylene glycol (MIRALAX/GLYCOLAX) Packet Take 17 g by mouth daily as needed   at PRN Yes Reported, Patient   senna-docusate (SENOKOT-S/PERICOLACE) 8.6-50 MG tablet Take 1 tablet by mouth daily 4/30/2019 at AM Yes Unknown, Entered By History   senna-docusate (SENOKOT-S/PERICOLACE) 8.6-50 MG tablet Take 1 tablet by mouth daily as needed for constipation  at PRN Yes Unknown, Entered By History   simvastatin (ZOCOR) 40 MG tablet TAKE 1 TABLET BY MOUTH AT BEDTIME 4/29/2019 at HS Yes Davey Thornton APRN CNP   VITAMIN D, CHOLECALCIFEROL, PO Take 2,000 Units by mouth daily 4/30/2019 at AM Yes Unknown, Entered By History       Frank Mcconnell, ElizaD

## 2019-04-30 NOTE — ED NOTES
Hospitalist has evaluated pt. Pt resting in room. Pending bed upstairs. Pt offered dinner. Declines for now.

## 2019-04-30 NOTE — CONSULTS
[facility]    Orthopedic Consultation    Aura Broderick MRN# [id number]   Age: 89 year old YOB: 1926     Date of Admission: 4/30/2019    Reason for consult: Right hip femoral neck fracture       Requesting physician:        Level of consult: Consult, follow and place orders           Assessment and Plan:   Assessment:   Right hip femoral neck fracture        Plan:   Surgery to be scheduled with Dr Prado tomorrow. Discussed treatment plan, risks, benefits of surgery with the patient and her daughter in law, Magy. They would like to proceed with surgery.    NPO effective midnight  Bedrest  Pain medication as needed             Chief Complaint:   Right hip pain following a fall         History of Present Illness:   Aura Broderick is a 89 year old female with alzheimer's disease, presented to the ED for evaluation of a fall. The patient reports that earlier this afternoon she was walking with her walker, tripped, and fell, injuring her her right hip, prompting her to the ED. The patient was experiencing pain in both of her hips and her upper right leg.  Of note, the patient comes from an assisted living care facility and the nursing staff their reports that the patient fell out of bed.  Orthopedics was consulted due to x-ray findings revealing a right hip femoral neck fracture.  Patient's daughter-in-law, Magy, is present at the time of the exam.  She notes that prior to the fall, patient does ambulate with a walker at baseline.  Patient is unable to relay how the injury happened at time of exam.  Currently is denying any left leg pain.         Past Medical History:     Past Medical History:   Diagnosis Date     Dizziness and giddiness      Essential hypertension, benign      Myocardial infarction     LAD - stent - [facility]     Other and unspecified hyperlipidemia      Palpitations      Pure hypercholesterolemia              Past Surgical History:     Past Surgical  History:   Procedure Laterality Date     C NONSPECIFIC PROCEDURE      tonsillectomy     C NONSPECIFIC PROCEDURE      D&C     C NONSPECIFIC PROCEDURE  06-7-99    COLONOSCOPY      CLOSED REDUCTION, PERCUTANEOUS PINNING HIP Left 3/16/2016    Procedure: CLOSED REDUCTION, PERCUTANEOUS PINNING HIP;  Surgeon: Sukh Kebede MD;  Location: [facility] OR     CORONARY ANGIOGRAPHY ADULT ORDER  2008-[facility]    stent             Social History:     Social History     Tobacco Use     Smoking status: Former Smoker     Years: 10.00     Smokeless tobacco: Never Used   Substance Use Topics     Alcohol use: Yes     Alcohol/week: 0.0 oz     Comment: rare             Family History:     Family History   Problem Relation Age of Onset     Cancer Mother         pancreatic     Heart Disease Father 83        MI     Cancer Brother         renal cell             Immunizations:     VACCINE/DOSE   Diptheria   DPT   DTAP   HBIG   Hepatitis A   Hepatitis B   HIB   Influenza   Measles   Meningococcal   MMR   Mumps   Pneumococcal   Polio   Rubella   Small Pox   TDAP   Varicella   Zoster             Allergies:     Allergies   Allergen Reactions     Contrast Dye              Medications:     Current Facility-Administered Medications   Medication     fentaNYL (PF) (SUBLIMAZE) injection 25 mcg     Current Outpatient Medications   Medication Sig     acetaminophen (TYLENOL) 500 MG tablet Take 2 tablets (1,000 mg) by mouth 2 times daily. May also take 1 tablet (500 mg) 2 times daily as needed for pain.     amLODIPine (NORVASC) 5 MG tablet TAKE 1 TABLET BY MOUTH ONCE DAILY     AMOXICILLIN PO Take 2,000 mg by mouth as needed (1 hour prior to dental apt.)     ASPERCREME LIDOCAINE 4 % Patch APPLY 1 PATCH TO LEFT LEG ONCE DAILY ( ON IN THE MORNING AND OFF AT BEDTIME )     ASPIRIN ADULT LOW STRENGTH 81 MG EC tablet TAKE 1 TABLET BY MOUTH ONCE DAILY     Bisacodyl 10 MG/30ML ENEM Place 30 mLs (10 mg) rectally as needed     Cranberry (THERACRAN ONE) 360 MG CAPS Take  1 capsule by mouth 2 times daily     glycerin, adult, 2 g SUPP Suppository Place 1 suppository rectally daily as needed for constipation     lisinopril (PRINIVIL/ZESTRIL) 10 MG tablet TAKE 1 TABLET BY MOUTH ONCE DAILY     loperamide (IMODIUM A-D) 2 MG tablet Take 2 tabs (4 mg) after first loose stool, and then take one tab (2 mg) after each diarrheal stool.  Max of 8 tabs (16 mg) per day.     oxyCODONE (OXY-IR) 5 MG capsule Take 5 mg by mouth every 4 hours as needed for severe pain     PHENAZOPYRIDINE HCL PO Take 95 mg by mouth 2 times daily as needed for irritation      polyethylene glycol (MIRALAX/GLYCOLAX) Packet Take 17 g by mouth daily as needed      senna-docusate (SENOKOT-S/PERICOLACE) 8.6-50 MG tablet Take 1 tablet by mouth daily     senna-docusate (SENOKOT-S/PERICOLACE) 8.6-50 MG tablet Take 1 tablet by mouth daily as needed for constipation     simvastatin (ZOCOR) 40 MG tablet TAKE 1 TABLET BY MOUTH AT BEDTIME     VITAMIN D, CHOLECALCIFEROL, PO Take 2,000 Units by mouth daily             Review of Systems:   CV: NEGATIVE for chest pain, palpitations or peripheral edema  C: NEGATIVE for fever, chills, change in weight  E/M: NEGATIVE for ear, mouth and throat problems  R: NEGATIVE for significant cough or SOB          Physical Exam:   All vitals have been reviewed  Patient Vitals for the past 24 hrs:   BP Temp Temp src Pulse Heart Rate Resp SpO2   04/30/19 1500 175/82 -- -- 89 -- -- 93 %   04/30/19 1430 165/74 -- -- 87 -- -- 96 %   04/30/19 1408 167/75 97.7  F (36.5  C) Oral 92 92 18 95 %     No intake or output data in the 24 hours ending 04/30/19 1603  On physical exam of the right lower extremity, minimal external rotation present.  Very small skin abrasion on the anterior right knee.  No abrasion seen at the right hip.  Unable to comprehend dorsi and plantar flexion of the right ankle.  She is able to move her left ankle.  Distal pulses are intact and equal bilaterally.  No pitting edema present.           Data:   All laboratory data reviewed  Results for orders placed or performed during the hospital encounter of 04/30/19   XR Femur Bilateral 2 Views    Narrative    XR BILATERAL FEMUR TWO VIEWS   4/30/2019 3:02 PM     HISTORY: Fell, pain.    COMPARISON: None.      Impression    IMPRESSION:   Left femur: There are 3 screws across the left femoral neck. No acute  fracture or dislocation. Arterial calcifications noted.  Right femur: There is a minimally displaced right femoral neck  fracture. Mild arterial calcifications noted.    CHONG TILLEY MD   CBC (+ platelets, no diff)   Result Value Ref Range    WBC 6.5 4.0 - 11.0 10e9/L    RBC Count 4.11 3.8 - 5.2 10e12/L    Hemoglobin 12.8 11.7 - 15.7 g/dL    Hematocrit 39.4 35.0 - 47.0 %    MCV 96 78 - 100 fl    MCH 31.1 26.5 - 33.0 pg    MCHC 32.5 31.5 - 36.5 g/dL    RDW 14.3 10.0 - 15.0 %    Platelet Count 265 150 - 450 10e9/L   Basic metabolic panel   Result Value Ref Range    Sodium Canceled, Test credited, specimen discarded 133 - 144 mmol/L    Potassium Canceled, Test credited, specimen discarded 3.4 - 5.3 mmol/L    Chloride Canceled, Test credited, specimen discarded 94 - 109 mmol/L    Carbon Dioxide Canceled, Test credited, specimen discarded 20 - 32 mmol/L    Anion Gap Canceled, Test credited, specimen discarded 6 - 17 mmol/L    Glucose Canceled, Test credited, specimen discarded 70 - 99 mg/dL    Urea Nitrogen Canceled, Test credited, specimen discarded 7 - 30 mg/dL    Creatinine Canceled, Test credited, specimen discarded 0.52 - 1.04 mg/dL    GFR Estimate Canceled, Test credited, specimen discarded >60 mL/min/[1.73_m2]    GFR Estimate If Black Canceled, Test credited, specimen discarded >60 mL/min/[1.73_m2]    Calcium Canceled, Test credited, specimen discarded 8.5 - 10.1 mg/dL   Basic metabolic panel   Result Value Ref Range    Sodium 139 133 - 144 mmol/L    Potassium 4.3 3.4 - 5.3 mmol/L    Chloride 103 94 - 109 mmol/L    Carbon Dioxide 28 20 - 32 mmol/L     Anion Gap 8 3 - 14 mmol/L    Glucose 120 (H) 70 - 99 mg/dL    Urea Nitrogen 30 7 - 30 mg/dL    Creatinine 1.10 (H) 0.52 - 1.04 mg/dL    GFR Estimate 43 (L) >60 mL/min/[1.73_m2]    GFR Estimate If Black 50 (L) >60 mL/min/[1.73_m2]    Calcium 9.4 8.5 - 10.1 mg/dL   EKG 12-lead, tracing only   Result Value Ref Range    Interpretation ECG Click View Image link to view waveform and result           Attestation:  I have reviewed today's vital signs, notes, medications, labs and imaging with Dr. Prado.  Amount of time performed on this consult: 30 minutes.    Veronica Fatima PA-C

## 2019-05-01 PROBLEM — S72.009A HIP FRACTURE REQUIRING OPERATIVE REPAIR (H): Status: ACTIVE | Noted: 2019-01-01

## 2019-05-01 NOTE — PROGRESS NOTES
Ortho:    Discussed surgical plan with the patient's son, Dr Broderick.  He gives consent to proceed with a right hip hemiarthroplasty.    Surgery scheduled for later today.  Pain medication increased per his request.   Continue NPO status      Veronica Fatima PA-C on 5/1/2019 at 10:37 AM

## 2019-05-01 NOTE — PLAN OF CARE
Disoriented to time, place, situation, with baseline dementia. VSS. CMS intact. Lidocaine patch on left upper thigh taken off at bedtime per med reconciliation report at admission. PRN tylenol for right hip pain. Bed rest. Incontinent, frequent reposition with assist 2. NPO at midnight. Plan for surgery this AM.

## 2019-05-01 NOTE — PROGRESS NOTES
A/O x2, baseline dementia. VSS on RA, except for HTN. Complains of pain with movement, declines intervention. CMS intact. Bedrest. Incontinent of urine. Will continue to monitor.

## 2019-05-01 NOTE — PROGRESS NOTES
[facility]    Hospitalist Progress Note      Assessment & Plan   Aura Broderick is a 89 year old female with hx of dementia, CAD s/p PCI, HTN, CKD, recurrent UTIs, and recent fall who presents from her memory care unit at [facility] for evaluation for evaluation right hip pain.     Rip Hip Pain  Right femoral neck fracture.  - presents with right hip pain following mechanical fall.XR femur shows a minimally displaced right femoral neck fracture.   - Ortho consult appreciated  - EKG shows SR with 1st degree AVB. No angina symptoms, no pulmonary disease Medically cleared for surgery today  - npo for now, iv fluids, medel in place  - bowel regimen  - pain management and DVT/Px as per Ortho  - post op- PT/OT/SW consult     Constipation  - Start senna-docusate 1 tab po BID  - Miralax and supp prn     CAD, native heart, native vessels s/p PCI  Essential hypertension  - Resume PTA amlodipine 5 mg po daily and lisinopril 10 mg po daily  - Resume PTA simvastatin  - Hold PTA ASA for now, resume when OK with Ortho     CKD stage III  Creatinine stable within baseline 1-1.3     Dementia without behavioral disturbance  - high risk to develop delirium  - delirium protocol, minimize narcotics as able, gentle directioning       DVT Prophylaxis: Defer to primary service  Code Status: DNR/DNI  Expected discharge: POD#3-4, recommended to transitional care unit pending post op course    Shannan Acevedo MD    Interval History   Sleepy after received pain meds; discussed with daughter in law at bedside- she did not have any concerns or questions; she hopes her MIL will be discharged to [facility]; pt is scheduled for surgery at 4:30 pm; discussed with RN.    -Data reviewed today: I reviewed all new labs and imaging results over the last 24 hours. I personally reviewed the X ray right femur image(s) showing as above.    Physical Exam   Temp: 97.3  F (36.3  C) Temp src: Oral BP: 155/80 Pulse: 93 Heart Rate: 87 Resp: 16  SpO2: 91 % O2 Device: None (Room air)    Vitals:    04/30/19 1721 05/01/19 0700   Weight: 50.9 kg (112 lb 4.8 oz) 53.8 kg (118 lb 8 oz)     Vital Signs with Ranges  Temp:  [97.3  F (36.3  C)-98.1  F (36.7  C)] 97.3  F (36.3  C)  Pulse:  [87-96] 93  Heart Rate:  [] 87  Resp:  [16-18] 16  BP: (141-175)/(64-86) 155/80  SpO2:  [91 %-100 %] 91 %  I/O last 3 completed shifts:  In: 240 [P.O.:240]  Out: -     Constitutional: Sleepy, no acute distress  Respiratory: Bilateral air entry, no wheezing, no rales, no crackles  Cardiovascular: S1S2, RRR, no murmurs, no rubs  GI:abd- soft, nonT, nonD, BS present  Skin/Integumen: no rashes, no cyanosis  Extremities- no leg swelling      Medications     sodium chloride 75 mL/hr at 04/30/19 2337       amLODIPine  5 mg Oral Daily     fentaNYL  25 mcg Intravenous Once     lisinopril  10 mg Oral Daily     senna-docusate  1 tablet Oral Daily     simvastatin  40 mg Oral At Bedtime       Data   Recent Labs   Lab 05/01/19  0645 04/30/19  1508 04/30/19  1430   WBC 10.0  --  6.5   HGB 12.5  --  12.8   MCV 95  --  96     --  265   INR 0.98  --   --     139 Canceled, Test credited, specimen discarded   POTASSIUM 4.1 4.3 Canceled, Test credited, specimen discarded   CHLORIDE 107 103 Canceled, Test credited, specimen discarded   CO2 27 28 Canceled, Test credited, specimen discarded   BUN 32* 30 Canceled, Test credited, specimen discarded   CR 1.08* 1.10* Canceled, Test credited, specimen discarded   ANIONGAP 5 8 Canceled, Test credited, specimen discarded   LEFTY 8.8 9.4 Canceled, Test credited, specimen discarded   * 120* Canceled, Test credited, specimen discarded       Recent Results (from the past 24 hour(s))   XR Femur Bilateral 2 Views    Narrative    XR BILATERAL FEMUR TWO VIEWS   4/30/2019 3:02 PM     HISTORY: Fell, pain.    COMPARISON: None.      Impression    IMPRESSION:   Left femur: There are 3 screws across the left femoral neck. No acute  fracture or  dislocation. Arterial calcifications noted.  Right femur: There is a minimally displaced right femoral neck  fracture. Mild arterial calcifications noted.    CHONG TILLEY MD

## 2019-05-01 NOTE — PLAN OF CARE
OT: orders received. Plans noted in chart for surgery this date. Will follow up after procedure.

## 2019-05-01 NOTE — PLAN OF CARE
PT: Orders received, chart reviewed. Pt admitted with R hip femoral neck fracture with plan for surgery. Will complete PT orders and await orders post procedure.

## 2019-05-01 NOTE — PLAN OF CARE
Pt disoriented x4. VSS. Repositioned q2hr. Reports pain 4/10 using IV dilaudid with relief. Incontinent of bladder. NPO. Transferred to pre-op.

## 2019-05-01 NOTE — ANESTHESIA PREPROCEDURE EVALUATION
Anesthesia Pre-Procedure Evaluation    Patient: Aura Broderick   MRN: [id number] : 1926          Preoperative Diagnosis: UNKNOWN    Procedure(s):  HEMIARTHROPLASTY, RIGHT HIP, BIPOLAR    Past Medical History:   Diagnosis Date     Dizziness and giddiness      Essential hypertension, benign      Myocardial infarction     LAD - stent - [facility]     Other and unspecified hyperlipidemia      Palpitations      Pure hypercholesterolemia      Past Surgical History:   Procedure Laterality Date     C NONSPECIFIC PROCEDURE      tonsillectomy     C NONSPECIFIC PROCEDURE      D&C     C NONSPECIFIC PROCEDURE  99    COLONOSCOPY      CLOSED REDUCTION, PERCUTANEOUS PINNING HIP Left 3/16/2016    Procedure: CLOSED REDUCTION, PERCUTANEOUS PINNING HIP;  Surgeon: Sukh Kebede MD;  Location:  OR     CORONARY ANGIOGRAPHY ADULT ORDER  [facility]       Anesthesia Evaluation     .             ROS/MED HX    ENT/Pulmonary:       Neurologic:     (+)dementia,     Cardiovascular:     (+) Dyslipidemia, hypertension--CAD, --stent,?  1 . : . . . :. .       METS/Exercise Tolerance:     Hematologic:         Musculoskeletal:         GI/Hepatic:        (-) GERD   Renal/Genitourinary:     (+) chronic renal disease, type: CRI,       Endo:         Psychiatric:         Infectious Disease:         Malignancy:         Other:                          Physical Exam  Normal systems: cardiovascular, pulmonary and dental    Airway     Dental     Cardiovascular       Pulmonary     Other findings: Would not open mouth         Lab Results   Component Value Date    WBC 10.0 2019    HGB 12.5 2019    HCT 38.3 2019     2019    SED 16 2017     2019    POTASSIUM 4.1 2019    CHLORIDE 107 2019    CO2 27 2019    BUN 32 (H) 2019    CR 1.08 (H) 2019     (H) 2019    LEFTY 8.8 2019    ALBUMIN 2.8 (L) 2019    PROTTOTAL 5.9 (L)  "01/31/2019    ALT 17 01/31/2019    AST 22 01/31/2019    ALKPHOS 57 01/31/2019    BILITOTAL 0.3 01/31/2019    PTT 32 03/15/2016    INR 0.98 05/01/2019    TSH 1.640 07/26/2013       Preop Vitals  BP Readings from Last 3 Encounters:   05/01/19 151/63   03/13/19 142/83   02/06/19 140/82    Pulse Readings from Last 3 Encounters:   04/30/19 93   03/13/19 84   02/06/19 70      Resp Readings from Last 3 Encounters:   05/01/19 20   03/13/19 16   02/06/19 18    SpO2 Readings from Last 3 Encounters:   05/01/19 92%   03/13/19 98%   02/06/19 97%      Temp Readings from Last 1 Encounters:   05/01/19 37.3  C (99.1  F) (Temporal)    Ht Readings from Last 1 Encounters:   02/02/19 1.626 m (5' 4\")      Wt Readings from Last 1 Encounters:   05/01/19 53.8 kg (118 lb 8 oz)    Estimated body mass index is 20.34 kg/m  as calculated from the following:    Height as of 2/2/19: 1.626 m (5' 4\").    Weight as of this encounter: 53.8 kg (118 lb 8 oz).       Anesthesia Plan      History & Physical Review  History and physical reviewed and following examination; no interval change.    ASA Status:  3 .    NPO Status:  > 8 hours    Plan for General and ETT with Intravenous and Propofol induction. Maintenance will be Inhalation.    PONV prophylaxis:  Ondansetron (or other 5HT-3) and Dexamethasone or Solumedrol       Postoperative Care  Postoperative pain management:  IV analgesics and Oral pain medications.      Consents  Anesthetic plan, risks, benefits and alternatives discussed with:  Legal guardian and Daughter/Son..                 Connor Cheema MD  "

## 2019-05-02 NOTE — PROGRESS NOTES
[facility]    Hospitalist Progress Note      Assessment & Plan   Aura Broderick is a 89 year old female with hx of dementia, CAD s/p PCI, HTN, CKD, recurrent UTIs, and recent fall who presents from her memory care unit at [place] for evaluation for evaluation right hip pain.     Rip Hip Pain  Right femoral neck fracture.  - presents with right hip pain following mechanical fall.XR femur shows a minimally displaced right femoral neck fracture.   - Ortho consult appreciated  - s/p  bipolar hemiarthroplasty replacement POD#2  - pain management- scheduled Tylenol, Oxycodone prn  - DVT/Px as per Ortho- started on Lovenox  - bowel regimen  - PT/OT/SW consult    Acute blood loss anemia  - Hb dropped form 12.8--10.9  - hemodynamically stable  - Hb in am     Constipation  - senna-docusate 1 tab po BID  - Miralax and supp prn     CAD, native heart, native vessels s/p PCI  Essential hypertension  - continue PTA amlodipine 5 mg po daily and lisinopril 10 mg po daily  - continuue PTA simvastatin  - Hold PTA ASA for now as she is on Lovenox     CKD stage III  Creatinine stable within baseline 1-1.3--1.05     Dementia without behavioral disturbance  - high risk to develop delirium  - delirium protocol, minimize narcotics as able, gentle directioning     DVT Prophylaxis: Defer to primary service  Code Status: Full Code  Expected discharge: 2 - 3 days, recommended to transitional care unit- [place]- pending post op course    Shannan Acevedo MD    Interval History    Having right hip pain, confused; discussed with 2 daughters at bedside; apparently no chest pain, no SOB.  - discussed with RN    -Data reviewed today: I reviewed all new labs and imaging results over the last 24 hours. I personally reviewed no images or EKG's today.    Physical Exam   Temp: 98  F (36.7  C) Temp src: Axillary BP: 125/75 Pulse: 100 Heart Rate: 94 Resp: 12 SpO2: 98 % O2 Device: Nasal cannula Oxygen Delivery: 4 LPM  Vitals:    04/30/19  1721 05/01/19 0700 05/02/19 0557   Weight: 50.9 kg (112 lb 4.8 oz) 53.8 kg (118 lb 8 oz) 56.2 kg (123 lb 12.8 oz)     Vital Signs with Ranges  Temp:  [97.3  F (36.3  C)-99.5  F (37.5  C)] 98  F (36.7  C)  Pulse:  [] 100  Heart Rate:  [] 94  Resp:  [10-22] 12  BP: (100-156)/() 125/75  SpO2:  [89 %-98 %] 98 %  I/O last 3 completed shifts:  In: 1100 [I.V.:1100]  Out: 600 [Urine:500; Blood:100]    Constitutional: Awake, alert, mild distress because of pain  Respiratory: Bilateral air entry, no wheezing, no rales, no crackles  Cardiovascular: S1S2, RRR, no murmurs, no rubs  GI:abd- soft, nonT, nonD, BS present  Skin/Integumen: no rashes, no cyanosis  Extremities- no leg swelling      Medications     sodium chloride 75 mL/hr at 05/01/19 2142       acetaminophen  975 mg Oral Q8H     amLODIPine  5 mg Oral Daily     ceFAZolin  1 g Intravenous Q8H     enoxaparin  30 mg Subcutaneous Q24H     lisinopril  10 mg Oral Daily     senna-docusate  1 tablet Oral BID    Or     senna-docusate  2 tablet Oral BID     simvastatin  40 mg Oral At Bedtime     sodium chloride (PF)  3 mL Intracatheter Q8H       Data   Recent Labs   Lab 05/02/19  0653 05/01/19  0645 04/30/19  1508 04/30/19  1430   WBC  --  10.0  --  6.5   HGB 10.9* 12.5  --  12.8   MCV  --  95  --  96   PLT  --  246  --  265   INR  --  0.98  --   --     139 139 Canceled, Test credited, specimen discarded   POTASSIUM 4.6 4.1 4.3 Canceled, Test credited, specimen discarded   CHLORIDE 110* 107 103 Canceled, Test credited, specimen discarded   CO2 23 27 28 Canceled, Test credited, specimen discarded   BUN 31* 32* 30 Canceled, Test credited, specimen discarded   CR 1.05* 1.08* 1.10* Canceled, Test credited, specimen discarded   ANIONGAP 7 5 8 Canceled, Test credited, specimen discarded   LEFTY 8.4* 8.8 9.4 Canceled, Test credited, specimen discarded   * 109* 120* Canceled, Test credited, specimen discarded       Recent Results (from the past 24 hour(s))    XR Pelvis w Hip Port Right 1 View    Narrative    PELVIS AND HIP PORTABLE RIGHT ONE VIEW   5/1/2019 8:48 PM     HISTORY: Bipolar hip.    COMPARISON: 2/2/2019      Impression    IMPRESSION: Right total hip arthroplasty with components in good  position. Postoperative air in the soft tissues. Pins in the left hip.    MC LUNA MD

## 2019-05-02 NOTE — ANESTHESIA CARE TRANSFER NOTE
Patient: Aura Broderick    Procedure(s):  HEMIARTHROPLASTY, RIGHT HIP, BIPOLAR    Diagnosis: UNKNOWN  Diagnosis Additional Information: No value filed.    Anesthesia Type:   General, ETT     Note:  Airway :Face Mask  Patient transferred to:PACU  Comments: Transferred to PACU, spontaneous respirations, 10L oxygen via facemask.  All monitors and alarms on and functioning, VSS.  Patient awake, comfortable.  Report to PACU RN.Handoff Report: Identifed the Patient, Identified the Reponsible Provider, Reviewed the pertinent medical history, Discussed the surgical course, Reviewed Intra-OP anesthesia mangement and issues during anesthesia, Set expectations for post-procedure period and Allowed opportunity for questions and acknowledgement of understanding      Vitals: (Last set prior to Anesthesia Care Transfer)    CRNA VITALS  5/1/2019 1918 - 5/1/2019 1955 5/1/2019             Pulse:  99    SpO2:  95 %    Resp Rate (observed):  1  (Abnormal)     Resp Rate (set):  10                Electronically Signed By: RODY Russo CRNA  May 1, 2019  7:55 PM

## 2019-05-02 NOTE — PLAN OF CARE
Discharge Planner OT   Patient plan for discharge: Rehab    Current status: Order received and chart reviewed. Pt admitted from [facility] after fall resulting in R hip fracture. Prior to admit, pt receives A for all ADLs. Pt currently requires max A x2 for bed mobility. PT unable to progress pt further for functional transfers today due to 10/10 pain.     Barriers to return to prior living situation: Current level of A required; Pain level; Baseline cognitive deficit with safety concerns; Fall risk with history    Recommendations for discharge: TCU    Rationale for recommendations: Pt limited by pain, impaired cognition and safety, and decreased balance and activity tolerance. Pt's rehab needs are currently being met by PT. OT would be better suited at next level of care, TCU.        Entered by: Christiane Loja 05/02/2019 2:28 PM

## 2019-05-02 NOTE — OP NOTE
Procedure Date: 05/01/2019      PREOPERATIVE DIAGNOSIS:  Displaced right femoral neck fracture.      POSTOPERATIVE DIAGNOSIS:  Displaced right femoral neck fracture.      PROCEDURE:  Bipolar hemiarthroplasty replacement of right displaced femoral neck fracture.      ANESTHESIA:  General.      ASSISTANT:  ROSA Roman.      INDICATIONS:  This 89-year-old woman who fell yesterday sustaining the above-described injury.  Appropriate risks and alternatives have been discussed at length, and it has been elected to proceed with surgical intervention.      PROCEDURE:  The patient was brought to the operating room, given a general anesthetic, transferred to the operative table.  She was positioned left side down and right hip prepped and draped sterilely in the usual manner.  We made a standard lateral incision.  Dissection was carried down sharply to the fascia.  Fascia was split in line with its fibers.  The anterior one-half of the abductor was dissected free off the greater trochanter, capsule opened and the hip dislocated through the fracture site.  Femoral neck was osteotomized to an appropriate level.      We extracted the femoral head from the acetabulum and sized it for a 44 mm head.  A 44 mm bipolar head was trialed and fit very nicely.      We turned our attention back to the femur.  Progressively larger reamers and broaches were used until we sized for a #11 component.  Trial reduction started with a -3 neck length and a 44 mm diameter head.  We had restored leg length with full range of motion and excellent stability.      Hip was redislocated and trial components removed.  Bony surfaces water picked with antibiotic solution.  Real femoral component driven into position.  We retrialed the head and again chose a -3 component.  The bipolar component was snapped onto the 28 mm head and this was driven onto the C taper.  Final reduction again gave excellent restoration of leg length with excellent range of  motion and excellent stability.      Wound was irrigated copiously with antibiotic solution.  Hemostasis was obtained by electrocautery.  Closure done in layers, closing the abductors back to the greater trochanter with #1 Vicryl, fascia with #1 Vicryl, subcutaneous tissue with 2-0 Vicryl, Insorb on the skin.  A sterile compressive dressing applied.  The patient returned supine to her bed and taken to the recovery room in good condition.  There were no intraoperative complications and minimal blood loss.         ELEONORA LAI MD             D: 2019   T: 2019   MT: EDGAR      Name:     CADEN CAMPBELL   MRN:      [id number]        Account:        [id number]   :      1926           Procedure Date: 2019      Document: [id number]       cc: Eleonora Lai MD

## 2019-05-02 NOTE — BRIEF OP NOTE
[facility]    Brief Operative Note    Pre-operative diagnosis: UNKNOWN  Post-operative diagnosis * No post-op diagnosis entered *  Procedure: Procedure(s):  HEMIARTHROPLASTY, RIGHT HIP, BIPOLAR  Surgeon: Surgeon(s) and Role:     * Seb Prado MD - Primary  Anesthesia: General   Estimated blood loss: Less than 100 ml  Drains: None  Specimens: * No specimens in log *  Findings:   None.  Complications: None.  Implants:    Implant Name Type Inv. Item Serial No.  Lot No. LRB No. Used   IMP HEAD FEMORAL SNR [place] 28MM -3 [id number] Total Joint Component/Insert IMP HEAD FEMORAL SNR [place] 28MM -3 [id number]  [place]  [id number] Right 1   IMP CUP SNN BIPOLAR TANDEM 44MM [id number] Total Joint Component/Insert IMP CUP SNN BIPOLAR TANDEM 44MM [id number]  [place]  [id number] Right 1   IMP STEM FEM HIP SNN SYNERGY POROUS SZ 11 [id number] Total Joint Component/Insert IMP STEM FEM HIP SNN SYNERGY POROUS SZ 11 [id number]  [place]  [id number] Right 1

## 2019-05-02 NOTE — PLAN OF CARE
Pt. Alert to self only, confused.  Pt has baseline dementia. Pt. Lethargic at times.  VS on 1L nc.  R hip dressing changed, aquacel; clean, dry, intact.  NS running at 75mL.  Did not ambulate; turn & repo.  Fair PO intake.  Incontinent at times, bladder scanned for 201cc at 1420.

## 2019-05-02 NOTE — PLAN OF CARE
Discharge Planner PT   Patient plan for discharge: family reports plan for TCU at [place]  Current status: PT consult received. Chart reviewed. PT evaluation and treatment initiated. 89y/o F POD#1 bipolar hemiarthroplasty replacement of right displaced femoral neck fracture by Dr. Prado with orders for WBAT, no precautions. Pt admitted on 4/30 s/p fall with displaced right femoral neck fracture with hx of dementia, CAD s/p PCI, HTN. Pt resides in memory care. History obtained from family in room. Pt SBA with all mobility with FWW, able to ambulate 50-100ft to the dining room. Pt required assist of 1 person with all ADLs.     Pt oriented x 0. Reports her name is Yesika (this is her daughter's name). Required reorientation to location/situation. Requires frequent repetition with all commands, follows simple commands less than 50% of the time. Difficulty understanding the pt due to mumbled speech. Pt frequently closing her eyes (she reports her pain is less with her eyes closed). Reports 10/10 pain in her right hip.  Able to engage pt with supine bilateral LE ex with max tactile and verbal cues. Max A x 2 with supine<>sit. Tolerated sitting at EOB about 5 min. Significant fatigue post transfer, appeared to be sleeping at end of session. Vitals stable when returned to bed with FL=094/53mmHg, HR=87bpm, SPO2=94%.   Barriers to return to prior living situation: level of assist, pain  Recommendations for discharge: TCU  Rationale for recommendations: Pt will benefit from skilled PT to maximize her level of functional mobility prior to return to memory care to decrease level of burden of care.       Entered by: Shagufta Real 05/02/2019 11:51 AM

## 2019-05-02 NOTE — ANESTHESIA POSTPROCEDURE EVALUATION
Patient: Aura Broderick    Procedure(s):  HEMIARTHROPLASTY, RIGHT HIP, BIPOLAR    Diagnosis:UNKNOWN  Diagnosis Additional Information: No value filed.    Anesthesia Type:  General, ETT    Note:  Anesthesia Post Evaluation    Patient location during evaluation: PACU  Patient participation: Able to fully participate in evaluation  Level of consciousness: awake and alert  Pain management: adequate  Airway patency: patent  Cardiovascular status: acceptable  Respiratory status: acceptable  Hydration status: acceptable  PONV: none     Anesthetic complications: None          Last vitals:  Vitals:    05/01/19 2050 05/01/19 2100 05/01/19 2127   BP: 134/82 128/65    Pulse: 98 100    Resp: 11 10 12   Temp: 37.5  C (99.5  F) 37.4  C (99.3  F) 36.7  C (98  F)   SpO2: 91% 90%          Electronically Signed By: Connor Cheema MD  May 1, 2019  9:45 PM

## 2019-05-02 NOTE — CONSULTS
Care Transition Initial Assessment - SW     Met with: Patient and Family-dtr-in-law    Principal Problem:    Hip fracture (H)  Active Problems:    CAD (coronary artery disease)    HTN (hypertension)    Cognitive impairment    Senile osteoporosis    Late onset Alzheimer's disease without behavioral disturbance    Benign hypertension with CKD (chronic kidney disease) stage III (H)    Femoral fracture (H)    Hip fracture requiring operative repair (H)       DATA  Lives With: spouse      Quality of Family Relationships: supportive, involved  Description of Support System: Supportive, Involved  Who is your support system?: Children  Identified issues/concerns regarding health management: discharge planning   Quality of Family Relationships: supportive, involved     ASSESSMENT  Cognitive Status:  awake and alert  Concerns to be addressed:      Per SW consult for discharge planning. Pt admitted 4/30 with a hip fracture. Discharge date is unknown at this time. Reviewed medical record. Met with pt and dtr-in-law to discuss discharge plan. Pt was previously living at [facility] with her spouse. Discussed recommendation for TCU. They are in agreement with TCU. [place] is the preferred spot as pt lives in the [facility]. SW will put out referral via DOD and then follow-up with pt/dtr-in-law.     PLAN  Financial costs for the patient includes: discussed private room fee and pt is in agreement with it.   Patient given options and choices for discharge: reviewed TCU choices list   Patient/family is agreeable to the plan?  Yes  Patient Goals and Preferences: TCU   Patient anticipates discharging to: TCU    Will continue to follow and support a safe discharge plan    Wilma New MSW, LGSW

## 2019-05-02 NOTE — PLAN OF CARE
PACU @ 2130.  Lethargic, somnolent post surgery, arouses to voice and gentle touch.  Alert to self.  VSS.  2.5 L NC.  Right hip dressing intact, moderate drainage, marked, unchanged.  CMS intact, palpable pulses.  Patient unable to follow command for full assessment.  Bladder scan and straight cath @ 0400, 500 urine output.  WBAT, has not been up.  Abductor pillow in place.  Assist with T/R.  Denies pain but moans with repositioning.  IV dilaudid given x1 this AM.  NS infusing @ 75.

## 2019-05-02 NOTE — PROGRESS NOTES
05/02/19 1118   Quick Adds   Type of Visit Initial PT Evaluation   Living Environment   Lives With spouse   Living Arrangements extended care facility   Home Accessibility no concerns   Living Environment Comment Pt resides in memory care unit at [place]    [facility]   Usual Activity Tolerance fair   Current Activity Tolerance poor   Equipment Currently Used at Home walker, rolling   Activity/Exercise/Self-Care Comment History obtained from family in room. Pt SBA with all mobility with FWW, able to ambulate 50-100ft to the dining room. Pt required assist of 1 person with all ADLs. Pt's family reports that pt's  just returned to memory care this week after sustaining a stroke   Functional Level Prior   Ambulation 3-->assistive equipment and person   Transferring 3-->assistive equipment and person   Toileting 3-->assistive equipment and person   Bathing 3-->assistive equipment and person   Communication 2-->difficulty understanding and speaking (not related to language barrier)   Cognition 2 - difficulty with organizing thoughts   Fall history within last six months yes   Number of times patient has fallen within last six months 2  (per chart)   Which of the above functional risks had a recent onset or change? ambulation   General Information   Onset of Illness/Injury or Date of Surgery - Date 04/30/19   Referring Physician Seb Prado MD   Patient/Family Goals Statement TCU prior to return to memory care   Pertinent History of Current Problem (include personal factors and/or comorbidities that impact the POC)  89y/o F POD#1 bipolar hemiarthroplasty replacement of right displaced femoral neck fracture by Dr. Prado with orders for WBAT, no precautions. Pt admitted on 4/30 s/p fall with displaced right femoral neck fracture with hx of dementia, CAD s/p PCI, HTN.    Precautions/Limitations fall precautions   Weight-Bearing Status - RLE weight-bearing as tolerated   General Observations Pt resting in  bed, 2 daughters present at bedside   Cognitive Status Examination   Orientation not oriented to person, place or time   Level of Consciousness alert   Follows Commands and Answers Questions 25% of the time;able to follow single-step instructions   Personal Safety and Judgment impaired   Memory impaired   Cognitive Comment Pt oriented x 0. Reports her name is Yesika (this is her daughter's name). Required reorientation to location/situation. Requires frequent repetition with all commands, follows simple commands less than 50% of the time. Difficulty understanding the pt due to mumbled speech. Pt frequently closing her eyes (she reports her pain is less with her eyes closed).    Pain Assessment   Patient Currently in Pain Yes, see Vital Sign flowsheet  (pt reports 10/10)   Integumentary/Edema   Integumentary/Edema Comments dressing over right hip incision dry and clean   Range of Motion (ROM)   ROM Comment right LE: able to achieve about 85 deg at EOB and 90deg knee flexion. bilateral ankle able to achieve 5 deg from neutral dorsiflexion. left LE appears WFL, bilateral UE appears WFL   Strength   Strength Comments Pt unable to follow commands for MMT, right LE grossly 3-/5, left LE: grossly: 3+ to 4-/5.    Bed Mobility   Bed Mobility Comments max A x 2 with supine to sit and repo in bed.    Transfer Skills   Transfer Comments unable    Gait   Gait Comments unable    Balance   Balance Comments poor sitting balance, pt with posterior lean due to right hip pain, requires min to mod A x 2    Sensory Examination   Sensory Perception Comments unable to test due to cognition   Modality Interventions   Planned Modality Interventions Cryotherapy   General Therapy Interventions   Planned Therapy Interventions balance training;bed mobility training;gait training;strengthening;transfer training   Clinical Impression   Criteria for Skilled Therapeutic Intervention yes, treatment indicated   PT Diagnosis impaired gait   Influenced  "by the following impairments imparied strength, pain, impaired activity tolerance, impaired balance   Functional limitations due to impairments impaired functional mobility with transfers/gait   Clinical Presentation Evolving/Changing   Clinical Presentation Rationale based on cognition, pain, level of assist, clinical judgement   Clinical Decision Making (Complexity) Moderate complexity   Therapy Frequency` daily   Predicted Duration of Therapy Intervention (days/wks) 4 days   Anticipated Discharge Disposition Transitional Care Facility   Risk & Benefits of therapy have been explained Yes   Patient, Family & other staff in agreement with plan of care Yes   [facility] TM \"6 Clicks\"   2016, Trustees of [facility], under license to [employer].  All rights reserved.   6 Clicks Short Forms Basic Mobility Inpatient Short Form   [facility]  \"6 Clicks\" V.2 Basic Mobility Inpatient Short Form   1. Turning from your back to your side while in a flat bed without using bedrails? 1 - Total   2. Moving from lying on your back to sitting on the side of a flat bed without using bedrails? 1 - Total   3. Moving to and from a bed to a chair (including a wheelchair)? 1 - Total   4. Standing up from a chair using your arms (e.g., wheelchair, or bedside chair)? 1 - Total   5. To walk in hospital room? 1 - Total   6. Climbing 3-5 steps with a railing? 1 - Total   Basic Mobility Raw Score (Score out of 24.Lower scores equate to lower levels of function) 6   Total Evaluation Time   Total Evaluation Time (Minutes) 10     "

## 2019-05-03 NOTE — PLAN OF CARE
A&O x1 self only intermittently.  Turn Q2.  Incontinent of urine.  Iron given for HGB of 7.9.  Oxycodone for pain.

## 2019-05-03 NOTE — PLAN OF CARE
Pt remains A/O to self. Turn and repo with A2. IVF. Incontinent. BC at 2130 238ml. 1L O2. Slight drainage on dressing. Continue to monitor.

## 2019-05-03 NOTE — CONSULTS
Consult Date:  2019      ORTHOPEDIC CONSULTATION      CHIEF COMPLAINT:  Right hip pain.      HISTORY OF PRESENT ILLNESS:  This woman fell, sustaining injury to her right hip.  She is admitted for definitive management.      PHYSICAL EXAMINATION:  The patient is confused but does complain of pain on any movement of her right hip.  Neurovascular status to her foot is intact.      X-RAYS:  X-rays show a displaced femoral neck fracture.      IMPRESSION:  Displaced right femoral neck fracture.      PLAN:  I had a long and pleasant discussion with the patient and her family about this condition, risks, options and alternatives of management.  I recommended a bipolar hemiarthroplasty replacement.  We talked about how this could occur, the risks, expected results and the rehabilitation required.  They understand and agree to proceed.         ELEONORA LAI MD             D: 2019   T: 2019   MT: HERBERTH      Name:     CADEN CAMPBELL   MRN:      [id number]        Account:       [id number]   :      1926           Consult Date:  2019      Document: [id number]       cc: Eleonora Lai MD

## 2019-05-03 NOTE — PROGRESS NOTES
[facility]    Hospitalist Progress Note      Assessment & Plan   Aura Broderick is a 89 year old female with hx of dementia, CAD s/p PCI, HTN, CKD, recurrent UTIs, and recent fall who presents from her memory care unit at [place] for evaluation for evaluation right hip pain.     Rip Hip Pain  Right femoral neck fracture.  - presents with right hip pain following mechanical fall.XR femur shows a minimally displaced right femoral neck fracture.   - Ortho consult appreciated  - s/p  bipolar hemiarthroplasty replacement POD#2  - pain management- scheduled Tylenol, Oxycodone prn  - DVT/Px as per Ortho- started on Lovenox  - bowel regimen  - PT/OT/SW consult    Acute blood loss anemia  - Hb dropped form 12.8--10.9--7.9  - hemodynamically stable; consider holding Lovenox today  - will give iv iron for 2 doses  - Hb in am     Constipation  - senna-docusate 1 tab po BID  - Miralax and supp prn     CAD, native heart, native vessels s/p PCI  Essential hypertension  - continue PTA amlodipine 5 mg po daily and lisinopril 10 mg po daily  - continuue PTA simvastatin  - Hold PTA ASA for now as she is on Lovenox     CKD stage III  Creatinine stable within baseline 1-1.3  - cr here 1.05--1.11  - encourage oral intake     Dementia without behavioral disturbance  - high risk to develop delirium  - delirium protocol, minimize narcotics as able, gentle directioning     DVT Prophylaxis: Defer to primary service  Code Status: DNR/DNI  Expected discharge: possible tomorrow, recommended to transitional care unit- [place]- pending post op course    Shannan Acevedo MD    Interval History    Sleeping, NAD; no events overnight; as per report- no chest pain, no SOB, appetite poor  - discussed with RN and daughter in law at bedside    -Data reviewed today: I reviewed all new labs and imaging results over the last 24 hours. I personally reviewed no images or EKG's today.    Physical Exam   Temp: 97.8  F (36.6  C) Temp src:  Axillary BP: 137/64 Pulse: 92 Heart Rate: 81 Resp: 16 SpO2: 98 % O2 Device: Nasal cannula Oxygen Delivery: 4 LPM  Vitals:    04/30/19 1721 05/01/19 0700 05/02/19 0557   Weight: 50.9 kg (112 lb 4.8 oz) 53.8 kg (118 lb 8 oz) 56.2 kg (123 lb 12.8 oz)     Vital Signs with Ranges  Temp:  [97.8  F (36.6  C)-99  F (37.2  C)] 97.8  F (36.6  C)  Pulse:  [92] 92  Heart Rate:  [] 81  Resp:  [14-18] 16  BP: (100-139)/(45-70) 137/64  SpO2:  [88 %-98 %] 98 %  I/O last 3 completed shifts:  In: 2282 [P.O.:200; I.V.:2082]  Out: -     Constitutional: Sleeping, NAD  Respiratory: Bilateral air entry, no wheezing, no rales, no crackles  Cardiovascular: S1S2, RRR, no murmurs, no rubs  GI:abd- soft, nonT, nonD, BS present  Skin/Integumen: no rashes, no cyanosis  Extremities- no leg swelling      Medications     sodium chloride 75 mL/hr at 05/02/19 2059       acetaminophen  975 mg Oral Q8H     amLODIPine  5 mg Oral Daily     enoxaparin  30 mg Subcutaneous Q24H     iron sucrose (VENOFER) intermittent infusion  300 mg Intravenous Daily     lisinopril  10 mg Oral Daily     senna-docusate  1 tablet Oral BID    Or     senna-docusate  2 tablet Oral BID     simvastatin  40 mg Oral At Bedtime     sodium chloride (PF)  3 mL Intracatheter Q8H       Data   Recent Labs   Lab 05/03/19  0717 05/02/19  0653 05/01/19  0645 04/30/19  1508 04/30/19  1430   WBC  --   --  10.0  --  6.5   HGB 7.9* 10.9* 12.5  --  12.8   MCV  --   --  95  --  96   PLT  --   --  246  --  265   INR  --   --  0.98  --   --    NA  --  140 139 139 Canceled, Test credited, specimen discarded   POTASSIUM  --  4.6 4.1 4.3 Canceled, Test credited, specimen discarded   CHLORIDE  --  110* 107 103 Canceled, Test credited, specimen discarded   CO2  --  23 27 28 Canceled, Test credited, specimen discarded   BUN  --  31* 32* 30 Canceled, Test credited, specimen discarded   CR 1.11* 1.05* 1.08* 1.10* Canceled, Test credited, specimen discarded   ANIONGAP  --  7 5 8 Canceled, Test  credited, specimen discarded   LEFTY  --  8.4* 8.8 9.4 Canceled, Test credited, specimen discarded   * 136* 109* 120* Canceled, Test credited, specimen discarded       No results found for this or any previous visit (from the past 24 hour(s)).

## 2019-05-03 NOTE — PLAN OF CARE
Disoriented x4. VSS on 2L O2. Denies pain, Oxycodone available. CMS intact. Dressing with scant drainage. Regular diet. IVF infusing. Turn and repo q2h. Incontinent. Continue to monitor.

## 2019-05-03 NOTE — PROGRESS NOTES
Orthopedic Surgery  Aura Broderick  5/3/2019  Admit Date:  2019  POD: 2 Days Post-Op   Procedure(s):  HEMIARTHROPLASTY, RIGHT HIP, BIPOLAR    Confused at baseline  Patient resting comfortably in bed.    Pain controlled.  Tolerating oral intake.    Denies nausea or vomiting  Denies chest pain or shortness of breath    Vital Sign Ranges  Temperature Temp  Av.3  F (36.8  C)  Min: 97.8  F (36.6  C)  Max: 99  F (37.2  C)   Blood pressure Systolic (24hrs), Av , Min:100 , Max:139        Diastolic (24hrs), Av, Min:45, Max:70      Pulse Pulse  Av  Min: 92  Max: 92   Respirations Resp  Av.1  Min: 14  Max: 18   Pulse oximetry SpO2  Av.1 %  Min: 88 %  Max: 98 %       Dressing is clean, dry, and intact.   Minimal erythema of the surrounding skin.   Bilateral calves are soft, non-tender.  Right lower extremity is NVI.  Sensation intact bilateral lower extremities  Patient able to dorsi and plantar flex bilaterally  +Dp pulse    Labs:  Recent Labs   Lab Test 19  0653 19  0645 19  1508   POTASSIUM 4.6 4.1 4.3     Recent Labs   Lab Test 19  0717 19  0653 19  0645   HGB 7.9* 10.9* 12.5     Recent Labs   Lab Test 19  0645 16  0607 03/15/16  1622   INR 0.98 1.03 0.93     Recent Labs   Lab Test 19  0645 19  1430 19  0715    265 224       A/P  1. PLAN:   Hospitalist recommending possibly holding Lovenox due to hemoglobin today.  Ok to hold and restart tomorrow.     Mobilize with PT/OT as able.    WBAT RLE.     Continue current pain regiment.   Dressings: Keep intact.  Change if saturated   Follow-up:  2 weeks post op Dr Prado    2. Disposition   Anticipate d/c to TCU when medically cleared and progressing in PT.    Veronica Fatima PA-C

## 2019-05-03 NOTE — PLAN OF CARE
Discharge Planner PT   Patient plan for discharge: family reports plan for TCU at [place]  Current status: Pt sleeping. Pt's daughter-in law present at bedside. Pt aroused to tactile and verbal cues. Pt oriented x 0, gives a different last name, however says yes when asked her correct last name. Minimal verbalizations with mumbled speech, <25% command following during session, with frequent moaning. Max A x 2 supine to sit, mod A x 1-2 with sitting at EOB due to posterior leaning despite cues. Max A x 2 with scooting in sitting. Tolerated sitting at EOB x 5 min, cues for hand placement to assist with balance. Sit to supine with max A x 2. Repo in bed with max A x 2. Pt demonstrating very little active effort to assist with bed mobility during session. Vitals stable when returned to bed with ZE=861/57mmHg, HR=79bpm, SPO2=94%.    Barriers to return to prior living situation: level of assist, pain  Recommendations for discharge: TCU  Rationale for recommendations: Pt will benefit from skilled PT to maximize her level of functional mobility prior to return to memory care to decrease level of burden of care.           Entered by: Shagufta Real 05/03/2019 11:09 AM

## 2019-05-04 NOTE — PLAN OF CARE
Alert to self, baseline. Dressing cdi, cms intact. VSS, on room air. Appears comfortable. Discharge to TCU via stretcher. Daughter in law in room.

## 2019-05-04 NOTE — PROGRESS NOTES
SW:  D:  Received discharge orders for patient.  Bed available at [place] for today.  Spoke with patient's bedside nurse who feels that patient is not safe to transport via wheelchair as she can not safely sit in a chair due to her confusion.  She also leans forward in the chair.  Patient needs closer monitoring in the back of the rig.  Call placed to [facility] to arrange for stretcher transport at 16:30 today.  Faxed the facesheet and PCS to [facility].  Patient and daughter informed of the plan and in agreement to the plan.  Call placed to update [place] and faxed the orders and the PAS.    PAS-RR    D: Per DHS regulation, AAKASH completed and submitted PAS-RR to [place] [employer] Direct Connect via the Senior LinkAge Line.  PAS-RR confirmation # is : [id number].    I: AAKASH spoke with patient and daughter and they are aware a PAS-RR has been submitted.  AAKASH reviewed with patient and daughter that they may be contacted for a follow up appointment within 10 days of hospital discharge if their SNF stay is < 30 days.  Contact information for [facility] LinkAge Line was also provided.    A: Patient and daughter verbalized understanding.    P: Further questions may be directed to [facility] LinkAge Line at #[phone], option #4 for PAS-RR staff.

## 2019-05-04 NOTE — PLAN OF CARE
Discharge Planner PT   Patient plan for discharge: family reports plan for TCU at [place]  Current status: Pt lying in bed. Pt's daughter-in law present at bedside. Pt reports increased right hip pain today. Agreeable to bilateral LE ex with encouragement. Pt reports high levels of pain with any movement of her right LE, moaning and yelling out intermittently. Pt with eyes closed 75% of session. Unable to attempt EOB sitting today due to poor pain tolerance with any mobility. Discussed with RN. Pt currently requires lift for transfers into a chair-Discussed with RN.   Barriers to return to prior living situation: level of assist, pain  Recommendations for discharge: TCU  Rationale for recommendations: Pt will benefit from skilled PT to maximize her level of functional mobility prior to return to memory care to decrease level of burden of care.           Entered by: Shagufta Real 05/04/2019 1:13 PM

## 2019-05-04 NOTE — PROGRESS NOTES
Orthopedic Surgery  5/4/2019  POD 3    S: Patient resting comfortably. Spoke with daughter in law. States she is ready for her to be at TCU. Feels her pain is well controlled.    O: Blood pressure 150/65, pulse 94, temperature 97.9  F (36.6  C), temperature source Oral, resp. rate 14, weight 56.2 kg (123 lb 12.8 oz), SpO2 93 %, not currently breastfeeding.  Lab Results   Component Value Date    HGB 8.4 05/04/2019     Lab Results   Component Value Date    INR 0.98 05/01/2019     I/O last 3 completed shifts:  In: 200 [P.O.:200]  Out: -   Distal extremity CMSI bilaterally.  Calves are negative bilaterally, both soft and nontender.  The dressing is C/D/I.      A: Ms. Broderick is doing well status post Procedure(s):  HEMIARTHROPLASTY, RIGHT HIP, BIPOLAR.    P: HGB was up today. Continue physical therapy. Continue DVT pphx with [facility]. Anticipate discharge to TCU today.    Chaya Garces PA-C  [phone]

## 2019-05-04 NOTE — DISCHARGE SUMMARY
[facility]    Discharge Summary  Hospitalist    Date of Admission:  4/30/2019  Date of Discharge:  5/4/2019  4:01 PM  Discharging Provider: Shannan Acevedo MD  Date of Service (when I saw the patient): 05/04/19    Discharge Diagnoses   Right femoral neck fracture s/p bipolar hemiarthroplasty replacement  Acute blood loss anemia    Chronic and stable medical problems:  CAD  HTN  HLP  Demetia    History of Present Illness   Aura Broderick is a 89 year old female with hx of dementia, CAD s/p PCI, HTN, CKD, recurrent UTIs, and recent fall who presents from her memory care unit at [place] for evaluation for evaluation right hip pain; she was found to have right femoral neck fracture; for a detailed HPI- please refer to H&P done by Dr Ruel Monroy on 04/30/2019.       Hospital Course   Aura Broderick was admitted on 4/30/2019.  The following problems were addressed during her hospitalization:    Right femoral neck fracture.  - presents with right hip pain following mechanical fall. XR femur shows a minimally displaced right femoral neck fracture.   - she was seen by Ortho consult and underwent bipolar hemiarthroplasty replacement on 05/01/2019  - post-op care as per Ortho; pain management (minimize narcotics to avoid increased confusion)  - bowel regimen  - DVT/px- Lovenox- as per Ortho  - PT/OT rec TCU      Acute blood loss anemia  - Hb dropped form 12.8--10.9--7.9 on 05/03; Lovenox held on 05/03/2019  - she was hemodynamically stable  - she received 2 doses of iv iron  - repeat Hb on 05/04 was 8.4  - repeat Hb in TCU in 2-3 days     Constipation  - senna-docusate 1 tab po BID  - Miralax and supp prn     CAD, native heart, native vessels s/p PCI  Essential hypertension  - continue PTA amlodipine 5 mg po daily and lisinopril 10 mg po daily  - continuue PTA simvastatin  - Hold PTA ASA for now as she is on Lovenox     CKD stage III  Creatinine stable within baseline 1-1.3  - cr here 1.05--1.11-  stable  - encourage oral intake     Dementia without behavioral disturbance  - high risk to develop delirium  - delirium protocol, minimize narcotics as able, gentle directioning    Shannan Acevedo MD    Significant Results and Procedures      Bipolar hemiarthroplasty replacement of right displaced femoral neck fracture 05/01/2019.        Pending Results   None  Unresulted Labs Ordered in the Past 30 Days of this Admission     No orders found from 3/1/2019 to 5/1/2019.          Code Status   DNR / DNI       Primary Care Physician   Davey Thornton    Physical Exam   Temp: 97.9  F (36.6  C) Temp src: Oral BP: 150/65 Pulse: 94 Heart Rate: 80 Resp: 14 SpO2: 93 % O2 Device: None (Room air) Oxygen Delivery: 1 LPM  Vitals:    04/30/19 1721 05/01/19 0700 05/02/19 0557   Weight: 50.9 kg (112 lb 4.8 oz) 53.8 kg (118 lb 8 oz) 56.2 kg (123 lb 12.8 oz)     Vital Signs with Ranges  Temp:  [97.4  F (36.3  C)-100.2  F (37.9  C)] 97.9  F (36.6  C)  Pulse:  [94] 94  Heart Rate:  [68-86] 80  Resp:  [14-16] 14  BP: (112-166)/(52-88) 150/65  SpO2:  [92 %-94 %] 93 %  I/O last 3 completed shifts:  In: 90 [P.O.:90]  Out: -     Constitutional: awake, alert, confused, NAD  Respiratory:     Bilateral air entry, no wheezing, no rales, no crackles  Cardiovascular: S1S2, RRR, no murmurs, no rubs  GI:abd- soft, nonT, nonD, BS present  Skin/Integumen: no rashes, no cyanosis  Extremities- no leg swelling  Neuro- demented, no focal neurological deficits         Discharge Disposition   Discharged to short-term care facility  Condition at discharge: Satisfactory    Consultations This Hospital Stay   ORTHOPEDIC SURGERY IP CONSULT  PHYSICAL THERAPY ADULT IP CONSULT  OCCUPATIONAL THERAPY ADULT IP CONSULT  CARE TRANSITION RN/SW IP CONSULT  OCCUPATIONAL THERAPY ADULT IP CONSULT  PHYSICAL THERAPY ADULT IP CONSULT  PHYSICAL THERAPY ADULT IP CONSULT  OCCUPATIONAL THERAPY ADULT IP CONSULT    Time Spent on this Encounter   I, Shannan Acevedo,  personally saw the patient today and spent less than or equal to 30 minutes discharging this patient.    Discharge Orders      General info for SNF    Length of Stay Estimate: Short Term Care: Estimated # of Days <30  Condition at Discharge: Improving  Level of care:skilled   Rehabilitation Potential: Good  Admission H&P remains valid and up-to-date: Yes  Recent Chemotherapy: N/A  Use Nursing Home Standing Orders: Yes     Mantoux instructions    Give two-step Mantoux (PPD) Per Facility Policy Yes     Reason for your hospital stay    Right hip fracture:  Right hip hemiarthroplasty     Wound care (specify)    Site:   Right hip  Instructions:  Keep dressings intact, change if >60% saturated or peeling off.     Follow Up and recommended labs and tests    Follow up with Dr. Prado 2 weeks post surgery.   Call [phone] for appointment and questions.     Activity - Up with assistive device    Up with walker     Weight bearing status    WBAT RLE     Follow Up and recommended labs and tests    Follow up with Nursing home physician in 2-3 days.  The following labs/tests are recommended: Hb.  Follow up with Ortho as scheduled.     Activity - Up with nursing assistance     DNR/DNI     Physical Therapy Adult Consult    Evaluate and treat as clinically indicated.    Reason:  Right hip fracture:  Right hip hemiarthroplasty     Occupational Therapy Adult Consult    Evaluate and treat as clinically indicated.    Reason:  Right hip fracture:  Right hip hemiarthroplasty     Fall precautions     Advance Diet as Tolerated    Follow this diet upon discharge: Orders Placed This Encounter      Room Service      Advance Diet as Tolerated: Regular Diet Adult     Discharge Medications   Current Discharge Medication List      START taking these medications    Details   enoxaparin (LOVENOX) 30 MG/0.3ML syringe Inject 0.3 mLs (30 mg) Subcutaneous every 24 hours for 14 days  Qty: 4.2 mL, Refills: 0    Associated Diagnoses: Closed fracture of  "neck of right femur, initial encounter (H)      ondansetron (ZOFRAN-ODT) 4 MG ODT tab Take 1 tablet (4 mg) by mouth every 8 hours as needed for nausea or vomiting  Qty: 10 tablet, Refills: 0    Associated Diagnoses: Closed fracture of neck of right femur, initial encounter (H)      oxyCODONE (ROXICODONE) 5 MG tablet Take 0.5-1 tablets (2.5-5 mg) by mouth every 4 hours as needed 1/2 tab po q 4 hrs prn pain scale \"1-5\"  1 tab po q 4 hrs prn pain scale \"6-10\"  Qty: 30 tablet, Refills: 0    Associated Diagnoses: Closed fracture of neck of right femur, initial encounter (H)         CONTINUE these medications which have CHANGED    Details   acetaminophen (TYLENOL) 325 MG tablet Take 2 tablets (650 mg) by mouth every 6 hours as needed for other (multimodal surgical pain management along with NSAIDS and opioid medication as indicated based on pain control and physical function.)  Qty: 30 tablet, Refills: 0    Associated Diagnoses: Closed fracture of neck of right femur, initial encounter (H)         CONTINUE these medications which have NOT CHANGED    Details   amLODIPine (NORVASC) 5 MG tablet TAKE 1 TABLET BY MOUTH ONCE DAILY  Qty: 30 tablet, Refills: 98    Associated Diagnoses: Essential hypertension      AMOXICILLIN PO Take 2,000 mg by mouth as needed (1 hour prior to dental apt.)      ASPERCREME LIDOCAINE 4 % Patch APPLY 1 PATCH TO LEFT LEG ONCE DAILY ( ON IN THE MORNING AND OFF AT BEDTIME )  Qty: 30 patch, Refills: 97    Associated Diagnoses: Acute pain of lower extremity, right      Bisacodyl 10 MG/30ML ENEM Place 30 mLs (10 mg) rectally as needed  Qty: 30 mL, Refills: 3    Associated Diagnoses: Constipation, unspecified constipation type      Cranberry (THERACRAN ONE) 360 MG CAPS Take 1 capsule by mouth 2 times daily      glycerin, adult, 2 g SUPP Suppository Place 1 suppository rectally daily as needed for constipation  Qty: 15 suppository, Refills: 0    Associated Diagnoses: Constipation, unspecified constipation " type      lisinopril (PRINIVIL/ZESTRIL) 10 MG tablet TAKE 1 TABLET BY MOUTH ONCE DAILY  Qty: 30 tablet, Refills: 11    Associated Diagnoses: Essential hypertension      loperamide (IMODIUM A-D) 2 MG tablet Take 2 tabs (4 mg) after first loose stool, and then take one tab (2 mg) after each diarrheal stool.  Max of 8 tabs (16 mg) per day.  Qty: 30 tablet, Refills: 0    Comments: Verbal order given to [facility] nurse Esther.  Associated Diagnoses: Antibiotic-associated diarrhea      PHENAZOPYRIDINE HCL PO Take 95 mg by mouth 2 times daily as needed for irritation       polyethylene glycol (MIRALAX/GLYCOLAX) Packet Take 17 g by mouth daily as needed       !! senna-docusate (SENOKOT-S/PERICOLACE) 8.6-50 MG tablet Take 1 tablet by mouth daily      !! senna-docusate (SENOKOT-S/PERICOLACE) 8.6-50 MG tablet Take 1 tablet by mouth daily as needed for constipation      simvastatin (ZOCOR) 40 MG tablet TAKE 1 TABLET BY MOUTH AT BEDTIME  Qty: 30 tablet, Refills: 97    Associated Diagnoses: Hyperlipidemia LDL goal <70      VITAMIN D, CHOLECALCIFEROL, PO Take 2,000 Units by mouth daily       !! - Potential duplicate medications found. Please discuss with provider.      STOP taking these medications       ASPIRIN ADULT LOW STRENGTH 81 MG EC tablet Comments:   Reason for Stopping:         oxyCODONE (OXY-IR) 5 MG capsule Comments:   Reason for Stopping:             Allergies   Allergies   Allergen Reactions     Contrast Dye      Data   Most Recent 3 CBC's:  Recent Labs   Lab Test 05/04/19  0731 05/03/19  0717 05/02/19  0653 05/01/19  0645 04/30/19  1430 01/31/19  0715   WBC  --   --   --  10.0 6.5 4.4   HGB 8.4* 7.9* 10.9* 12.5 12.8 13.1   MCV  --   --   --  95 96 91     --   --  246 265 224      Most Recent 3 BMP's:  Recent Labs   Lab Test 05/04/19  0731 05/03/19  0717 05/02/19  0653 05/01/19  0645 04/30/19  1508   NA  --   --  140 139 139   POTASSIUM  --   --  4.6 4.1 4.3   CHLORIDE  --   --  110* 107 103   CO2  --   --  23 27  28   BUN  --   --  31* 32* 30   CR 0.95 1.11* 1.05* 1.08* 1.10*   ANIONGAP  --   --  7 5 8   LEFTY  --   --  8.4* 8.8 9.4   GLC  --  107* 136* 109* 120*     Most Recent 2 LFT's:  Recent Labs   Lab Test 01/31/19  0715 06/15/18  1250   AST 22 16   ALT 17 15   ALKPHOS 57 81   BILITOTAL 0.3 0.5     Most Recent INR's and Anticoagulation Dosing History:  Anticoagulation Dose History     Recent Dosing and Labs Latest Ref Rng & Units 3/15/2016 3/16/2016 5/1/2019    INR 0.86 - 1.14 0.93 1.03 0.98        Most Recent 3 Troponin's:No lab results found.  Most Recent Cholesterol Panel:  Recent Labs   Lab Test 02/17/17  1415   CHOL 150   LDL 69   HDL 65   TRIG 81     Most Recent 6 Bacteria Isolates From Any Culture (See EPIC Reports for Culture Details):  Recent Labs   Lab Test 03/12/19  1315 02/02/19  1830 06/14/18  1817 05/29/18  2020 03/15/16  1654 01/07/16  1935   CULT >100,000 colonies/mL  Pseudomonas aeruginosa  * >100,000 colonies/mL  Escherichia coli  *  10,000 to 50,000 colonies/mL  mixed urogenital cady  Susceptibility testing not routinely done   10,000 to 50,000 colonies/mL  Pseudomonas aeruginosa  * >100,000 colonies/mL  Escherichia coli  * >100,000 colonies/mL Escherichia coli* No growth     Most Recent TSH, T4 and A1c Labs:  Recent Labs   Lab Test 07/26/13   TSH 1.640     Results for orders placed or performed during the hospital encounter of 04/30/19   XR Femur Bilateral 2 Views    Narrative    XR BILATERAL FEMUR TWO VIEWS   4/30/2019 3:02 PM     HISTORY: Fell, pain.    COMPARISON: None.      Impression    IMPRESSION:   Left femur: There are 3 screws across the left femoral neck. No acute  fracture or dislocation. Arterial calcifications noted.  Right femur: There is a minimally displaced right femoral neck  fracture. Mild arterial calcifications noted.    CHONG TILLEY MD   XR Pelvis w Hip Port Right 1 View    Narrative    PELVIS AND HIP PORTABLE RIGHT ONE VIEW   5/1/2019 8:48 PM     HISTORY: Bipolar  hip.    COMPARISON: 2/2/2019      Impression    IMPRESSION: Right total hip arthroplasty with components in good  position. Postoperative air in the soft tissues. Pins in the left hip.    MC LUNA MD

## 2019-05-04 NOTE — PLAN OF CARE
Pt confused x4, VSS on 2L, incontinent, POD 3, fluids running at 75, crush meds in applesauce, agitated at times, mitts put on pt, will continue to monitor

## 2019-05-04 NOTE — PLAN OF CARE
Alert to self. LS clear. BS active. Good appetite, total fed. Incontinent of bladder. IVFs infusing

## 2019-05-04 NOTE — DISCHARGE INSTRUCTIONS
Patient will discharge to [facility] today, via [facility] stretcher, at 16:30.  [facility]'s phone number is [phone].

## 2019-05-05 NOTE — PLAN OF CARE
Physical Therapy Discharge Summary    Reason for therapy discharge:    Discharged to transitional care facility.    Progress towards therapy goal(s). See goals on Care Plan in [facility] electronic health record for goal details.  Goals not met.  Barriers to achieving goals:   limited tolerance for therapy and discharge from facility.    Therapy recommendation(s):    Continued therapy is recommended.  Rationale/Recommendations:  Pt will benefit from cont PT at TCU to address strength and mobility.

## 2019-05-06 NOTE — LETTER
5/6/2019        RE: Aura Broderick  [facility]  [address]  [address]  [place] [address]        [facility]  PRIMARY CARE PROVIDER AND CLINIC:  Davey Thornton, RODY CNP, [address] / [facility] [address]  Chief Complaint   Patient presents with     Hospital F/U     [place] [facility] Record Number:  [id number]  [facility] where encounter took place:  [facility] [[id number]]    Aura Broderick  is a 89 year old  (9/25/1926), admitted to the above facility from  [facility]. Hospital stay 4/30/2019 through 5/4/2019..  Admitted to this facility for  rehab, medical management and nursing care.    HPI:    HPI information obtained from: facility chart records, facility staff, patient report, [facility] chart review and family/first contact daughter in law Magy Broderick report.   Brief Summary of Hospital Course:   She has a medical history significant for CAD with stent,  CKD stage 3, HTN, recurrent UTIs, dementia and was hospitalized with right hip pain after a fall at her AL. Imaging showed a right femoral neck fracture and she underwent bipolar hemiarthroplasty 5/1/2019 by Dr Prado. Tolerated the procedure well. Hgb dropped from 12.8 to 7. 9 on 5/3/2019. She received 2 doses of IV iron. Hgb 8.4 at discharge. Lovenox started for DVT prophylaxis. WBAT RLE.     Updates on Status Since Skilled nursing Admission:      Closed fracture of neck of right femur with routine healing, subsequent encounter-uncomfortable and restless this am, improved after oxycodone. Fairly poor  appetite. Pulled off the Aquacel dressing yesterday.   S/P hip hemiarthroplasty  Anemia due to blood loss, acute  CKD (chronic kidney disease) stage 3, GFR 30-59 ml/min (H)  Coronary artery disease involving native heart without angina pectoris, unspecified vessel or lesion type  Essential hypertension-BPs: 166/83, 147/73, 165 86   HR: 87-98  Slow transit  constipation-given a supp yesterday with results.   Personal history of urinary tract infection-recurrent UTIs.   Late onset Alzheimer's disease without behavioral disturbance-poor historian. Looking for her  and wants to go home. She has fairly severe deficits at baseline and is more confused than usual, per daughter in law.   Physical deconditioning-standing for transfers. Requires assist of 1-2 with cares.       CODE STATUS/ADVANCE DIRECTIVES DISCUSSION:   DNR / DNI  Patient's living condition: lives in an assisted living facility, [facility] with her    ALLERGIES: Contrast dye  PAST MEDICAL HISTORY:  has a past medical history of Dizziness and giddiness, Essential hypertension, benign, Myocardial infarction (H), Other and unspecified hyperlipidemia, Palpitations, and Pure hypercholesterolemia.  PAST SURGICAL HISTORY:   has a past surgical history that includes NONSPECIFIC PROCEDURE; NONSPECIFIC PROCEDURE; NONSPECIFIC PROCEDURE (06-7-99); [facility] Closed reduction, percutaneous pinning hip (Left, 3/16/2016); and Open reduction internal fixation hip bipolar (Right, 5/1/2019).  FAMILY HISTORY: family history includes Cancer in her brother and mother; Heart Disease (age of onset: 83) in her father.  SOCIAL HISTORY:   reports that she has quit smoking. She quit after 10.00 years of use. She has never used smokeless tobacco. She reports that she drinks alcohol. She reports that she does not use drugs.    Post Discharge Medication Reconciliation Status: discharge medications reconciled and changed, per note/orders (see AVS)    Current Outpatient Medications   Medication Sig Dispense Refill     acetaminophen (TYLENOL) 325 MG tablet Take 2 tablets (650 mg) by mouth every 6 hours as needed for other (multimodal surgical pain management along with NSAIDS and opioid medication as indicated based on pain control and physical function.) 30 tablet 0      "amLODIPine (NORVASC) 5 MG tablet TAKE 1 TABLET BY MOUTH ONCE DAILY 30 tablet 98     AMOXICILLIN PO Take 2,000 mg by mouth as needed (1 hour prior to dental apt.)       Bisacodyl 10 MG/30ML ENEM Place 30 mLs (10 mg) rectally as needed 30 mL 3     enoxaparin (LOVENOX) 30 MG/0.3ML syringe Inject 0.3 mLs (30 mg) Subcutaneous every 24 hours for 14 days 4.2 mL 0     glycerin, adult, 2 g SUPP Suppository Place 1 suppository rectally daily as needed for constipation 15 suppository 0     lisinopril (PRINIVIL/ZESTRIL) 10 MG tablet TAKE 1 TABLET BY MOUTH ONCE DAILY 30 tablet 11     loperamide (IMODIUM A-D) 2 MG tablet Take 2 tabs (4 mg) after first loose stool, and then take one tab (2 mg) after each diarrheal stool.  Max of 8 tabs (16 mg) per day. 30 tablet 0     ondansetron (ZOFRAN-ODT) 4 MG ODT tab Take 1 tablet (4 mg) by mouth every 8 hours as needed for nausea or vomiting 10 tablet 0     oxyCODONE (ROXICODONE) 5 MG tablet Take 0.5-1 tablets (2.5-5 mg) by mouth every 4 hours as needed 1/2 tab po q 4 hrs prn pain scale \"1-5\"  1 tab po q 4 hrs prn pain scale \"6-10\" 30 tablet 0     PHENAZOPYRIDINE HCL PO Take 95 mg by mouth 2 times daily as needed for irritation        polyethylene glycol (MIRALAX/GLYCOLAX) Packet Take 17 g by mouth daily as needed        senna-docusate (SENOKOT-S/PERICOLACE) 8.6-50 MG tablet Take 1 tablet by mouth daily       senna-docusate (SENOKOT-S/PERICOLACE) 8.6-50 MG tablet Take 1 tablet by mouth daily as needed for constipation       simvastatin (ZOCOR) 40 MG tablet TAKE 1 TABLET BY MOUTH AT BEDTIME 30 tablet 97     VITAMIN D, CHOLECALCIFEROL, PO Take 2,000 Units by mouth daily         ROS:  Unobtainable secondary to cognitive impairment.     Vitals:  /83   Pulse 98   Temp 98.3  F (36.8  C)   Resp 20   Wt 55.8 kg (123 lb)   SpO2 94%   BMI 21.11 kg/m     Exam:  GENERAL APPEARANCE:  Alert, in no distress, frail  ENT:  Mouth and posterior oropharynx normal, moist mucous membranes, [place]  EYES:  " EOM normal, conjunctiva and lids normal, PERRL  NECK:  No adenopathy,masses or thyromegaly  RESP:  respiratory effort and palpation of chest normal, lungs clear to auscultation , no respiratory distress  CV:  Palpation and auscultation of heart done , regular rate and rhythm, no murmur,  no edema, +2 pedal pulses  ABDOMEN:  normal bowel sounds, soft, nontender, no hepatosplenomegaly or other masses  M/S:   wheelchair. SHIN with good strength. No joint inflammation  SKIN:  right hip incision clean, dry, intact, no erythema. No rashes or open areas  PSYCH:  oriented to self, daughter in law, insight and judgement impaired, memory impaired     Lab/Diagnostic data:  Lab Results   Component Value Date    WBC 10.0 05/01/2019     Lab Results   Component Value Date    RBC 4.03 05/01/2019     Lab Results   Component Value Date    HGB 8.4 05/04/2019     Lab Results   Component Value Date    HCT 38.3 05/01/2019     Lab Results   Component Value Date    MCV 95 05/01/2019     Lab Results   Component Value Date    MCH 31.0 05/01/2019     Lab Results   Component Value Date    MCHC 32.6 05/01/2019     Lab Results   Component Value Date    RDW 14.2 05/01/2019     Lab Results   Component Value Date     05/04/2019     Last Comprehensive Metabolic Panel:  Sodium   Date Value Ref Range Status   05/02/2019 140 133 - 144 mmol/L Final     Potassium   Date Value Ref Range Status   05/02/2019 4.6 3.4 - 5.3 mmol/L Final     Chloride   Date Value Ref Range Status   05/02/2019 110 (H) 94 - 109 mmol/L Final     Carbon Dioxide   Date Value Ref Range Status   05/02/2019 23 20 - 32 mmol/L Final     Anion Gap   Date Value Ref Range Status   05/02/2019 7 3 - 14 mmol/L Final     Glucose   Date Value Ref Range Status   05/03/2019 107 (H) 70 - 99 mg/dL Final     Urea Nitrogen   Date Value Ref Range Status   05/02/2019 31 (H) 7 - 30 mg/dL Final     Creatinine   Date Value Ref Range Status   05/04/2019 0.95 0.52 - 1.04 mg/dL Final     GFR Estimate    Date Value Ref Range Status   05/04/2019 52 (L) >60 mL/min/[1.73_m2] Final     Comment:     Non  GFR Calc  Starting 12/18/2018, serum creatinine based estimated GFR (eGFR) will be   calculated using the Chronic Kidney Disease Epidemiology Collaboration   (CKD-EPI) equation.       Calcium   Date Value Ref Range Status   05/02/2019 8.4 (L) 8.5 - 10.1 mg/dL Final       ASSESSMENT / PLAN:  (S72.001D) Closed fracture of neck of right femur with routine healing, subsequent encounter  (primary encounter diagnosis)  (Z96.649) S/P hip hemiarthroplasty  Comment: pain appears fairly well controlled. Incision healing without signs of infection.   Plan: schedule tylenol. Continue oxycodone-closely monitor for delirium. Lovenox for DVT prophylaxis. WBAT. Daily dry dressing change. Ortho follow up 2 weeks, may consider onsite follow up with [facility] due to potential difficulty in attending outpatient appts.     (D62) Anemia due to blood loss, acute  Comment: received IV iron while inpatient. Family declines further lab draws.   Plan: monitor symptoms     (N18.3) CKD (chronic kidney disease) stage 3, GFR 30-59 ml/min (H)  Comment: renal function at baseline  Plan: avoid nephrotoxins.     (I25.10) Coronary artery disease involving native heart without angina pectoris, unspecified vessel or lesion type  Comment: no acute issues  Plan: hold ASA while on lovenox. Continue statin.     (I10) Essential hypertension  Comment: acceptable control, given her advanced age and fall risk  Plan: continue amlodipine, lisinopril. Monitor VS    (K59.01) Slow transit constipation  Comment: chronic. Narcotic contributing  Plan: increase senna S to bid. Continue miralax, supp prn.     (Z87.440) Personal history of urinary tract infection  Comment: no current symptoms of infection  Plan: monitor.Continue phenazopyridine.     (G30.1,  F02.80) Late onset Alzheimer's disease without behavioral disturbance  Comment: advanced disease.  She is not back to her baseline cognitive status.   Plan: supportive care    (R53.81) Physical deconditioning  Comment: due to acute injury, surgery, multiple comorbidities  Plan: PHYSICAL THERAPY/OT. Goal is to return to Memory Care AL with high level services.     (Z71.89) Advanced directives, counseling/discussion  Comment: she has a POLST on file and daughter in law confirms DNR/DNI. Family is leaning towards comfort care and would like hospice if she's not able to  progress in therapies. They are also considering hospice for patient's .   Discussed with patient's primary provider, Davey Thornton NP.         Total time spent with patient visit at the skilled nursing facility was 40 mins including patient visit, review of past records and discussion with daughter in law. Greater than 50% of total time spent with counseling and coordinating care due to complexity of care  Electronically signed by:  RODY Parikh CNP                     Sincerely,        RODY Parikh CNP

## 2019-05-06 NOTE — PROGRESS NOTES
[facility]  PRIMARY CARE PROVIDER AND CLINIC:  Davey Thornton, APRN CNP, [address] / [place] [address]  Chief Complaint   Patient presents with     Hospital F/U     [place] Medical Record Number:  [id number]  [facility] of Service where encounter took place[facility] [[id number]]    Aura Broderick  is a 89 year old  (9/25/1926), admitted to the above facility from  [facility]. Hospital stay 4/30/2019 through 5/4/2019..  Admitted to this facility for  rehab, medical management and nursing care.    HPI:    HPI information obtained from: facility chart records, facility staff, patient report, [facility] chart review and family/first contact daughter in law Magy Broderick report.   Brief Summary of Hospital Course:   She has a medical history significant for CAD with stent,  CKD stage 3, HTN, recurrent UTIs, dementia and was hospitalized with right hip pain after a fall at her AL. Imaging showed a right femoral neck fracture and she underwent bipolar hemiarthroplasty 5/1/2019 by Dr Prado. Tolerated the procedure well. Hgb dropped from 12.8 to 7. 9 on 5/3/2019. She received 2 doses of IV iron. Hgb 8.4 at discharge. Lovenox started for DVT prophylaxis. WBAT RLE.     Updates on Status Since Skilled nursing Admission:      Closed fracture of neck of right femur with routine healing, subsequent encounter-uncomfortable and restless this am, improved after oxycodone. Fairly poor  appetite. Pulled off the Aquacel dressing yesterday.   S/P hip hemiarthroplasty  Anemia due to blood loss, acute  CKD (chronic kidney disease) stage 3, GFR 30-59 ml/min (H)  Coronary artery disease involving native heart without angina pectoris, unspecified vessel or lesion type  Essential hypertension-BPs: 166/83, 147/73, 165 86   HR: 87-98  Slow transit constipation-given a supp yesterday with results.   Personal history of urinary tract infection-recurrent UTIs.   Late onset  Alzheimer's disease without behavioral disturbance-poor historian. Looking for her  and wants to go home. She has fairly severe deficits at baseline and is more confused than usual, per daughter in law.   Physical deconditioning-standing for transfers. Requires assist of 1-2 with cares.       CODE STATUS/ADVANCE DIRECTIVES DISCUSSION:   DNR / DNI  Patient's living condition: lives in an assisted living facility, [facility] with her    ALLERGIES: Contrast dye  PAST MEDICAL HISTORY:  has a past medical history of Dizziness and giddiness, Essential hypertension, benign, Myocardial infarction (H), Other and unspecified hyperlipidemia, Palpitations, and Pure hypercholesterolemia.  PAST SURGICAL HISTORY:   has a past surgical history that includes NONSPECIFIC PROCEDURE; NONSPECIFIC PROCEDURE; NONSPECIFIC PROCEDURE (06-7-99); Coronary [facility]iography Adult Order ([facility]); Closed reduction, percutaneous pinning hip (Left, 3/16/2016); and Open reduction internal fixation hip bipolar (Right, 5/1/2019).  FAMILY HISTORY: family history includes Cancer in her brother and mother; Heart Disease (age of onset: 83) in her father.  SOCIAL HISTORY:   reports that she has quit smoking. She quit after 10.00 years of use. She has never used smokeless tobacco. She reports that she drinks alcohol. She reports that she does not use drugs.    Post Discharge Medication Reconciliation Status: discharge medications reconciled and changed, per note/orders (see AVS)    Current Outpatient Medications   Medication Sig Dispense Refill     acetaminophen (TYLENOL) 325 MG tablet Take 2 tablets (650 mg) by mouth every 6 hours as needed for other (multimodal surgical pain management along with NSAIDS and opioid medication as indicated based on pain control and physical function.) 30 tablet 0     amLODIPine (NORVASC) 5 MG tablet TAKE 1 TABLET BY MOUTH ONCE DAILY 30 tablet 98     AMOXICILLIN PO Take 2,000 mg by mouth as  "needed (1 hour prior to dental apt.)       Bisacodyl 10 MG/30ML ENEM Place 30 mLs (10 mg) rectally as needed 30 mL 3     enoxaparin (LOVENOX) 30 MG/0.3ML syringe Inject 0.3 mLs (30 mg) Subcutaneous every 24 hours for 14 days 4.2 mL 0     glycerin, adult, 2 g SUPP Suppository Place 1 suppository rectally daily as needed for constipation 15 suppository 0     lisinopril (PRINIVIL/ZESTRIL) 10 MG tablet TAKE 1 TABLET BY MOUTH ONCE DAILY 30 tablet 11     loperamide (IMODIUM A-D) 2 MG tablet Take 2 tabs (4 mg) after first loose stool, and then take one tab (2 mg) after each diarrheal stool.  Max of 8 tabs (16 mg) per day. 30 tablet 0     ondansetron (ZOFRAN-ODT) 4 MG ODT tab Take 1 tablet (4 mg) by mouth every 8 hours as needed for nausea or vomiting 10 tablet 0     oxyCODONE (ROXICODONE) 5 MG tablet Take 0.5-1 tablets (2.5-5 mg) by mouth every 4 hours as needed 1/2 tab po q 4 hrs prn pain scale \"1-5\"  1 tab po q 4 hrs prn pain scale \"6-10\" 30 tablet 0     PHENAZOPYRIDINE HCL PO Take 95 mg by mouth 2 times daily as needed for irritation        polyethylene glycol (MIRALAX/GLYCOLAX) Packet Take 17 g by mouth daily as needed        senna-docusate (SENOKOT-S/PERICOLACE) 8.6-50 MG tablet Take 1 tablet by mouth daily       senna-docusate (SENOKOT-S/PERICOLACE) 8.6-50 MG tablet Take 1 tablet by mouth daily as needed for constipation       simvastatin (ZOCOR) 40 MG tablet TAKE 1 TABLET BY MOUTH AT BEDTIME 30 tablet 97     VITAMIN D, CHOLECALCIFEROL, PO Take 2,000 Units by mouth daily         ROS:  Unobtainable secondary to cognitive impairment.     Vitals:  /83   Pulse 98   Temp 98.3  F (36.8  C)   Resp 20   Wt 55.8 kg (123 lb)   SpO2 94%   BMI 21.11 kg/m    Exam:  GENERAL APPEARANCE:  Alert, in no distress, frail  ENT:  Mouth and posterior oropharynx normal, moist mucous membranes, [place]  EYES:  EOM normal, conjunctiva and lids normal, PERRL  NECK:  No adenopathy,masses or thyromegaly  RESP:  respiratory effort and " palpation of chest normal, lungs clear to auscultation , no respiratory distress  CV:  Palpation and auscultation of heart done , regular rate and rhythm, no murmur,  no edema, +2 pedal pulses  ABDOMEN:  normal bowel sounds, soft, nontender, no hepatosplenomegaly or other masses  M/S:   wheelchair. SHIN with good strength. No joint inflammation  SKIN:  right hip incision clean, dry, intact, no erythema. No rashes or open areas  PSYCH:  oriented to self, daughter in law, insight and judgement impaired, memory impaired     Lab/Diagnostic data:  Lab Results   Component Value Date    WBC 10.0 05/01/2019     Lab Results   Component Value Date    RBC 4.03 05/01/2019     Lab Results   Component Value Date    HGB 8.4 05/04/2019     Lab Results   Component Value Date    HCT 38.3 05/01/2019     Lab Results   Component Value Date    MCV 95 05/01/2019     Lab Results   Component Value Date    MCH 31.0 05/01/2019     Lab Results   Component Value Date    MCHC 32.6 05/01/2019     Lab Results   Component Value Date    RDW 14.2 05/01/2019     Lab Results   Component Value Date     05/04/2019     Last Comprehensive Metabolic Panel:  Sodium   Date Value Ref Range Status   05/02/2019 140 133 - 144 mmol/L Final     Potassium   Date Value Ref Range Status   05/02/2019 4.6 3.4 - 5.3 mmol/L Final     Chloride   Date Value Ref Range Status   05/02/2019 110 (H) 94 - 109 mmol/L Final     Carbon Dioxide   Date Value Ref Range Status   05/02/2019 23 20 - 32 mmol/L Final     Anion Gap   Date Value Ref Range Status   05/02/2019 7 3 - 14 mmol/L Final     Glucose   Date Value Ref Range Status   05/03/2019 107 (H) 70 - 99 mg/dL Final     Urea Nitrogen   Date Value Ref Range Status   05/02/2019 31 (H) 7 - 30 mg/dL Final     Creatinine   Date Value Ref Range Status   05/04/2019 0.95 0.52 - 1.04 mg/dL Final     GFR Estimate   Date Value Ref Range Status   05/04/2019 52 (L) >60 mL/min/[1.73_m2] Final     Comment:     Non  GFR  Calc  Starting 12/18/2018, serum creatinine based estimated GFR (eGFR) will be   calculated using the Chronic Kidney Disease Epidemiology Collaboration   (CKD-EPI) equation.       Calcium   Date Value Ref Range Status   05/02/2019 8.4 (L) 8.5 - 10.1 mg/dL Final       ASSESSMENT / PLAN:  (S72.001D) Closed fracture of neck of right femur with routine healing, subsequent encounter  (primary encounter diagnosis)  (Z96.139) S/P hip hemiarthroplasty  Comment: pain appears fairly well controlled. Incision healing without signs of infection.   Plan: schedule tylenol. Continue oxycodone-closely monitor for delirium. Lovenox for DVT prophylaxis. WBAT. Daily dry dressing change. Ortho follow up 2 weeks, may consider onsite follow up with [facility] due to potential difficulty in attending outpatient appts.     (D62) Anemia due to blood loss, acute  Comment: received IV iron while inpatient. Family declines further lab draws.   Plan: monitor symptoms     (N18.3) CKD (chronic kidney disease) stage 3, GFR 30-59 ml/min (H)  Comment: renal function at baseline  Plan: avoid nephrotoxins.     (I25.10) Coronary artery disease involving native heart without angina pectoris, unspecified vessel or lesion type  Comment: no acute issues  Plan: hold ASA while on lovenox. Continue statin.     (I10) Essential hypertension  Comment: acceptable control, given her advanced age and fall risk  Plan: continue amlodipine, lisinopril. Monitor VS    (K59.01) Slow transit constipation  Comment: chronic. Narcotic contributing  Plan: increase senna S to bid. Continue miralax, supp prn.     (Z87.440) Personal history of urinary tract infection  Comment: no current symptoms of infection  Plan: monitor.Continue phenazopyridine.     (G30.1,  F02.80) Late onset Alzheimer's disease without behavioral disturbance  Comment: advanced disease. She is not back to her baseline cognitive status.   Plan: supportive care    (R53.81) Physical  deconditioning  Comment: due to acute injury, surgery, multiple comorbidities  Plan: PHYSICAL THERAPY/OT. Goal is to return to Memory Care AL with high level services.     (Z71.89) Advanced directives, counseling/discussion  Comment: she has a POLST on file and daughter in law confirms DNR/DNI. Family is leaning towards comfort care and would like hospice if she's not able to  progress in therapies. They are also considering hospice for patient's .   Discussed with patient's primary provider, Davey Thornton NP.         Total time spent with patient visit at the skilled nursing facility was 40 mins including patient visit, review of past records and discussion with daughter in law. Greater than 50% of total time spent with counseling and coordinating care due to complexity of care  Electronically signed by:  RODY Parikh CNP

## 2019-05-07 NOTE — PROGRESS NOTES
Aura Broderick is a 89 year old female seen May 7, 2019 at [facility] where she was admitted this week after a fall at home in which she suffered a right femoral neck fracture.   She underwent bipolar hemiarthroplasty 5/1/19; post operative course complicated by anemia for which she received IV Fe.   She is WBAT and is now here for Rehab.       She has had cognitive decline over the past couple of years, needs assist with cares and resides in Memory Care unit    She ambulated with FWW prior to her fall.  She had a hospitalization in February for RLE pain, unremarkable workup.     Today patient is seen in her room, resting abed.   Her daughter-in-law is present and helps with history.   Patient appears very uncomfortable and anxious, does not respond to questions.  She does have intermittent nonverbal evidence of pain.      She had a choking episode with a meal yesterday, now no longer taking po.   Has not been able to work with therapies.    Family interested in comfort care, and request Hospice referral.       Past Medical History:   Diagnosis Date     Dizziness and giddiness      Essential hypertension, benign      Myocardial infarction     LAD - stent - [facility]ani     Other and unspecified hyperlipidemia      Palpitations      Pure hypercholesterolemia    S/p left hip fracture, 2016  S/p right hip fracture  CKD stage 3  Recurrent UTIs  Dementia, late onset    Past Surgical History:   Procedure Laterality Date     C NONSPECIFIC PROCEDURE      tonsillectomy     C NONSPECIFIC PROCEDURE      D&C     C NONSPECIFIC PROCEDURE  06-7-99    COLONOSCOPY      CLOSED REDUCTION, PERCUTANEOUS PINNING HIP Left 3/16/2016    Procedure: CLOSED REDUCTION, PERCUTANEOUS PINNING HIP;  Surgeon: Sukh Kebede MD;  Location:  OR     CORONARY ANGIOGRAPHY ADULT ORDER  2008-[facility]    stent     OPEN REDUCTION INTERNAL FIXATION HIP BIPOLAR Right 5/1/2019    Procedure: HEMIARTHROPLASTY, RIGHT HIP, BIPOLAR;  Surgeon: Johan  Seb STEPHENS MD;  Location: [facility] OR       SH:  , lives with her  in Memory Care unit.  They have lived at [facility] since 10/2017.      They have 2 sons and a daughter     Family History   Problem Relation Age of Onset     Cancer Mother         pancreatic     Heart Disease Father 83        MI     Cancer Brother         renal cell     ROS: h/o recurrent UTIs, tx'd in June 2018 and February 2019      Has alternating constipation and diarrhea, constipation most recently    CPT 4.4    SLUMS 14/30    Normal stress ECHO in [place], [place] 2014   No recent CP, dyspnea or CV symptoms, but very sedentary.       GENERAL APPEARANCE: moderate distress  /83   Pulse 98   Temp 98.3  F (36.8  C)   Resp 20   Wt 55.8 kg (123 lb)   SpO2 94%   BMI 21.11 kg/m     HEENT: normocephalic, no lesion or abnormalities  NECK: no adenopathy, no asymmetry, masses, or scars and thyroid normal to palpation  RESP: decreased BS, few coarse crackles  CV: regular rate and rhythm, normal S1 S2, occ ectopy  ABDOMEN:  soft, nontender, no HSM or masses and bowel sounds normal  MS: no ext edema  SKIN: no suspicious lesions or rashes; fragile skin.  Incision healing, steri-strips intact.   Bruising, mild edema  NEURO: Normal strength and tone, sensory exam grossly normal, and speech normal  PSYCH: affect anxious  LYMPHATICS: No cervical,  or supraclavicular nodes     Last Comprehensive Metabolic Panel:  Sodium   Date Value Ref Range Status   05/02/2019 140 133 - 144 mmol/L Final     Potassium   Date Value Ref Range Status   05/02/2019 4.6 3.4 - 5.3 mmol/L Final     Chloride   Date Value Ref Range Status   05/02/2019 110 (H) 94 - 109 mmol/L Final     Carbon Dioxide   Date Value Ref Range Status   05/02/2019 23 20 - 32 mmol/L Final     Anion Gap   Date Value Ref Range Status   05/02/2019 7 3 - 14 mmol/L Final     Glucose   Date Value Ref Range Status   05/03/2019 107 (H) 70 - 99 mg/dL Final     Urea Nitrogen   Date Value Ref Range Status    05/02/2019 31 (H) 7 - 30 mg/dL Final     Creatinine   Date Value Ref Range Status   05/04/2019 0.95 0.52 - 1.04 mg/dL Final     GFR Estimate   Date Value Ref Range Status   05/04/2019 52 (L) >60 mL/min/[1.73_m2] Final     Comment:     Non  GFR Calc  Starting 12/18/2018, serum creatinine based estimated GFR (eGFR) will be   calculated using the Chronic Kidney Disease Epidemiology Collaboration   (CKD-EPI) equation.       Calcium   Date Value Ref Range Status   05/02/2019 8.4 (L) 8.5 - 10.1 mg/dL Final     Lab Results   Component Value Date    WBC 10.0 05/01/2019      HGB 8.4 05/04/2019      MCV 95 05/01/2019       05/04/2019        IMP/PLAN:     (S72.001D) Closed fracture of neck of right femur with routine healing, subsequent encounter  (primary encounter diagnosis)  (Z96.649) S/P hip hemiarthroplasty  Comment: hip pain, impaired mobility    Plan: acetaminophen and oxycodone for pain management  DVT prophylaxis with 2 week course of Lovenox  Monitor incision.        (G30.1,  F02.80) Late onset Alzheimer's disease without behavioral disturbance  Comment: advanced stages, loss of language and low functional status     Plan: HOSPICE REFERRAL        Family looking to comfort approach in light of multiple comorbidities and declining status       (R13.10) Dysphagia, unspecified type  Comment: choking episode    Plan: HOSPICE REFERRAL        Soft foods, preferences, oral swabs       (D62) Anemia due to blood loss, acute  Comment: received IV Fe in the hospital  Plan: no further rechecks given goals of care.        (I12.9,  N18.3) Benign hypertension with chronic kidney disease, stage III    Comment: variable bps GFR 52  BP Readings from Last 3 Encounters:   05/06/19 166/83   05/05/19 166/83   05/04/19 150/65    Plan: continue amlodipine and lisinopril, follow bps     (I25.10) Coronary artery disease involving native heart without angina pectoris, unspecified vessel or lesion type  Comment: past h/o  MI  Plan: remains on a statin for secondary prevention; can look to discontinuation given goals of care.         (R19.8) Alternating constipation and diarrhea  Comment: ongoing, seems to be well managed at this point    Plan: bowel regimen        Advanced directives: DNR/DNI, Hospice referral    Mckayla Huitron MD

## 2019-05-08 NOTE — PROGRESS NOTES
[facility]  [place] [facility] Record Number:  [id number]  Place of Service where encounter took place:  [facility] ([facility]) [[id number]]  Chief Complaint   Patient presents with     RECHECK       HPI:    Aura Broderick  is a 89 year old (9/25/1926), who is being seen today for an episodic care visit.  HPI information obtained from: facility chart records, facility staff, [facility] chart review and family/first contact daughter in law Magy Broderick report.  She came to this facility 5/4/2019 for short term rehab and medical management following hospitalization after a mechanical fall resulting in a right femoral neck fracture. She underwent bipolar hemiarthroplasty 5/1/2019 by Dr Prado. Tolerated the procedure well. Hgb dropped from 12.8 to 7. 9 on 5/3/2019. She received 2 doses of IV iron. Hgb 8.4 at discharge. Lovenox was started for DVT prophylaxis.      Today's concern is:     Closed fracture of neck of right femur with routine healing, subsequent encounter  S/P hip hemiarthroplasty  Late onset Alzheimer's disease without behavioral disturbance  Dysphagia, unspecified type     She has had a decline in status since admission with worsening mental status, agitation and dysphagia. Ativan was started yesterday and she slept well through the night. Minimally responsive today.   Multiple family members are here and report she appears comfortable after oxycodone, but is no longer able to take anything orally. She enrolled with [facility] this afternoon and family would like her transferred back to her apt in Memory Care,where she lives with her .     Past Medical and Surgical History reviewed in Epic today.    MEDICATIONS:  Current Outpatient Medications   Medication Sig Dispense Refill     Bisacodyl 10 MG/30ML ENEM Place 30 mLs (10 mg) rectally as needed 30 mL 3     glycerin, adult, 2 g SUPP Suppository Place 1 suppository rectally daily as needed for constipation 15  suppository 0     LORazepam (ATIVAN) 0.5 MG tablet Take 0.25 mg by mouth 2 times daily as needed for anxiety       LORazepam (ATIVAN) 0.5 MG tablet Take 0.25 mg by mouth At Bedtime       morphine 2.5 MG solu-tab Take 2.5 mg by mouth every 4 hours as needed for shortness of breath / dyspnea or moderate to severe pain         REVIEW OF SYSTEMS:  Unobtainable secondary to cognitive impairment.     Objective:  /67   Pulse 85   Temp 98.4  F (36.9  C)   Resp 18   Wt 55.8 kg (123 lb)   SpO2 93%   BMI 21.11 kg/m    Exam:  GENERAL APPEARANCE:  somnolent, frail, appears comfortable  RESP:  respiratory effort and palpation of chest normal, lungs clear to auscultation , no respiratory distress  CV:  Palpation and auscultation of heart done , regular rate and rhythm, no murmur,  no edema, +2 pedal pulses  ABDOMEN:  soft, non-tender, no distension  SKIN:  right hip incision clean, dry, no erythema. No rashes or open areas    Labs:   Recent labs in [facility] reviewed by me today.     ASSESSMENT / PLAN:  (S72.001D) Closed fracture of neck of right femur with routine healing, subsequent encounter  (primary encounter diagnosis)  (Z96.649) S/P hip hemiarthroplasty  (G30.1,  F02.80) Late onset Alzheimer's disease without behavioral disturbance  (R13.10) Dysphagia, unspecified type  Comment: significant decline in status and minimally responsive today. Family very involved.   Plan: discontinue all meds, except ativan. Start morphine solu tabs for pain/respiratory distress. Comfort care with [facility]. Transfer back to Memory Care today.  Discussed with facility staff and hospice RN.         Total time spent with patient visit at the skilled nursing facility was 50 mins including patient visit, review of past records and discussion with family, multiple visits throughout the day. Greater than 50% of total time spent with counseling and coordinating care due to end of life care  Electronically signed by:  Negro Conn  RODY Timmons CNP

## 2023-01-01 NOTE — PROGRESS NOTES
"[facility]    Chief Complaint   Patient presents with     RGIS       [place] [facility] Record Number:  [id number]  Place of Service where encounter took place:  [facility] ([facility]) [[id number]]    HPI:    Aura Broderick is a 89 year old  (9/25/1926), who is being seen today for an episodic care visit.  HPI information obtained from: facility chart records, facility staff and [facility] chart review.    Aura (Sandra) seen today with her  present in their memory care unit has PMH which includes, cognitive loss, HTN, increased lipids, MI, UTIs, constipation who first moved into [facility] with her  in 10/2017 transferring to [facility] for increased services 2/2018 when her  was hospitalized. He has moved into [facility] with her. Today during our visit he provides most of her history.      Personal history of urinary tract infection  Most recently in ED 5/29 for condition and again 6/15 for ongoing abdominal pain. She denies hematuria, dysuria today. Has prn phenazopyridine (no recent use) and scheduled cranberry tabs.     Other constipation  Reports she has gone \"ten days without a bowel movement before\". Denies abdominal pain, nausea, vomiting. Was on Miralax daily but she was refusing so moved back to prn. Had rectal pain last week that has now resolved. Unclear if constipation was the issue. She does not want Senna S scheduled either but has prune juice daily with breakfast. Toilets herself and  unaware of her bowel movements so not good record keeping of bowel history. Thought about hemorrhoid cream as well. Her externals did not look aggravated but could be contributor. Will ask family if I should order or if they want to .    Essential hypertension  Hyperlipidemia LDL goal <70  Coronary artery disease involving native heart without angina pectoris, unspecified vessel or lesion type  BP and weights stable on chart review. BP " control improved with increase in Lisinopril. She denies lightheadedness, chest pain. Norvasc, ASA, Statin also daily. Trace edema in legs, no Kushal Hose. Last Echo grossly normal.      Wt:      ALLERGIES: Contrast dye  Past Medical, Surgical, Family and Social History reviewed and updated in [facility].    Current Outpatient Medications   Medication Sig Dispense Refill     amLODIPine (NORVASC) 5 MG tablet TAKE 1 TABLET BY MOUTH ONCE DAILY 30 tablet 98     AMOXICILLIN PO Take 2,000 mg by mouth as needed (1 hour prior to dental apt.)       aspirin 81 MG EC tablet Take 1 tablet (81 mg) by mouth daily 50 tablet 1     Bisacodyl 10 MG/30ML ENEM Place 30 mLs (10 mg) rectally as needed 30 mL 3     Cranberry (THERACRAN ONE) 360 MG CAPS Take 1 capsule by mouth 2 times daily       glycerin, adult, 2 g SUPP Suppository Place 1 suppository rectally daily as needed for constipation 15 suppository 0     lisinopril (PRINIVIL/ZESTRIL) 10 MG tablet TAKE 1 TABLET BY MOUTH ONCE DAILY 30 tablet 11     loperamide (IMODIUM A-D) 2 MG tablet Take 2 tabs (4 mg) after first loose stool, and then take one tab (2 mg) after each diarrheal stool.  Max of 8 tabs (16 mg) per day. 30 tablet 0     PHENAZOPYRIDINE HCL PO Take 95 mg by mouth daily as needed for irritation        polyethylene glycol (MIRALAX/GLYCOLAX) Packet Take 17 g by mouth daily as needed        senna-docusate (SENOKOT-S;PERICOLACE) 8.6-50 MG per tablet Take 1 tablet by mouth daily as needed        simvastatin (ZOCOR) 40 MG tablet TAKE 1 TABLET BY MOUTH AT BEDTIME 30 tablet 97     VITAMIN D, CHOLECALCIFEROL, PO Take 2,000 Units by mouth daily       Patient Active Problem List   Diagnosis     CAD (coronary artery disease)     HTN (hypertension)     Cognitive impairment     Senile osteoporosis     Pulmonary nodule     ACP (advance care planning)     Bacteremia     Hip fracture (H)     Anemia due to blood loss, acute     Physical deconditioning     Constipation     Tension headache      Alternating constipation and diarrhea     Late onset Alzheimer's disease without behavioral disturbance     Benign hypertension with CKD (chronic kidney disease) stage III (H)       Medications reviewed:  Medications reconciled to facility chart and changes were made to reflect current medications as identified as above med list. Below are the changes that were made:   Medications stopped since last EPIC medication reconciliation:   Medications Discontinued During This Encounter   Medication Reason     belladonna-opium (B&O SUPPRETTES) 16.2-30 MG per suppository Medication Reconciliation Clean Up     Misc Natural Products (COLON CARE PO) Medication Reconciliation Clean Up       Medications started since last [facility] medication reconciliation:  No orders of the defined types were placed in this encounter.      REVIEW OF SYSTEMS:  Limited secondary to cognitive impairment but today pt reports fine    Physical Exam:  /60   Pulse 84   Temp 97.5  F (36.4  C)   Resp 18   GENERAL APPEARANCE:  Alert, in no distress  ENT:  Mouth and posterior oropharynx normal, dry mucous membranes  EYES:  EOM, conjunctivae, lids, pupils and irises normal-glasses  NECK:  No adenopathy,masses or thyromegaly  RESP:  respiratory effort and palpation of chest normal, diminished breath sounds throughout  CV:  Palpation and auscultation of heart done , regular rate and rhythm, no murmur, rub, or gallop, peripheral edema trace+ in legs, ankles and feet  ABDOMEN:  normal bowel sounds, soft, nontender  M/S:   Gait and station at baseline with walker  SKIN:  pale and dry to visible areas  PSYCH:  insight and judgement impaired    Recent Labs:  CBC RESULTS:   Recent Labs   Lab Test 06/15/18  1250 05/29/18 2011   WBC 8.0 5.3   RBC 4.60 4.32   HGB 13.8 13.0   HCT 42.4 39.6   MCV 92 92   MCH 30.0 30.1   MCHC 32.5 32.8   RDW 13.5 13.3    239       Last Basic Metabolic Panel:  Recent Labs   Lab Test 09/05/18  0615 06/15/18  1250    139    POTASSIUM 4.0 4.3   CHLORIDE 106 105   LEFTY 9.0 9.2   CO2 27 26   BUN 33* 27   CR 1.15* 1.30*   GLC 79 84       Liver Function Studies -   Recent Labs   Lab Test 06/15/18  1250 05/29/18 2011   PROTTOTAL 7.5 6.5*   ALBUMIN 3.5 3.4   BILITOTAL 0.5 0.5   ALKPHOS 81 75   AST 16 17   ALT 15 15       TSH   Date Value Ref Range Status   07/26/2013 1.640 mcU/mL Final   06/27/2012 1.190 mcU/mL Final     Assessment/Plan:  (Z87.440) Personal history of urinary tract infection  (primary encounter diagnosis)  Comment: Hx of UTI  Plan:   -Cranberry tabs BID  -Monitor for s/s of infection  -Consider estradiol cream   -phenazopyridine 95 mg po daily prn     (K59.09) Other constipation  Comment: Chronic- now all medications are prn  Plan:   -Miralax daily prn  -Senna S 1 tablet daily prn  -Bisacodyl enema and Glycerin suppository prn   -Staff inquiring daily about bowel movements    (I10) Essential hypertension  (E78.5) Hyperlipidemia LDL goal <70  (I25.10) Coronary artery disease involving native heart without angina pectoris, unspecified vessel or lesion type  Comment: Chronic  Plan:   -Amlodipine 5 mg po every day  -ASA 81 mg po every day  -Lisinopril 10 mg po every day  -Weights and VS monthly by facility  -Zocor 40 mg po every day  -BMP and CBC periodically            Electronically signed by  RODY Chaves CNP                     11

## 2025-03-06 NOTE — LETTER
5/7/2019        RE: Aura Broderick  [facility]  [address]  [address]  [facility] [address]        Aura Broderick is a 89 year old female seen May 7, 2019 at [facility] where she was admitted this week after a fall at home in which she suffered a right femoral neck fracture.   She underwent bipolar hemiarthroplasty 5/1/19; post operative course complicated by anemia for which she received IV Fe.   She is WBAT and is now here for Rehab.       She has had cognitive decline over the past couple of years, needs assist with cares and resides in Memory Care unit    She ambulated with FWW prior to her fall.  She had a hospitalization in February for RLE pain, unremarkable workup.     Today patient is seen in her room, resting abed.   Her daughter-in-law is present and helps with history.   Patient appears very uncomfortable and anxious, does not respond to questions.  She does have intermittent nonverbal evidence of pain.      She had a choking episode with a meal yesterday, now no longer taking po.   Has not been able to work with therapies.    Family interested in comfort care, and request Hospice referral.       Past Medical History:   Diagnosis Date     Dizziness and giddiness      Essential hypertension, benign      Myocardial infarction     LAD - stent - [facility]     Other and unspecified hyperlipidemia      Palpitations      Pure hypercholesterolemia    S/p left hip fracture, 2016  S/p right hip fracture  CKD stage 3  Recurrent UTIs  Dementia, late onset    Past Surgical History:   Procedure Laterality Date     C NONSPECIFIC PROCEDURE      tonsillectomy     C NONSPECIFIC PROCEDURE      D&C     C NONSPECIFIC PROCEDURE  06-7-99    COLONOSCOPY      CLOSED REDUCTION, PERCUTANEOUS PINNING HIP Left 3/16/2016    Procedure: CLOSED REDUCTION, PERCUTANEOUS PINNING HIP;  Surgeon: Sukh Kebede MD;  Location:  OR     CORONARY ANGIOGRAPHY ADULT ORDER  2008-[place]    stent     OPEN    [place] Screening   Patient screens Positive for [facility] fall assessment screening if any of the following criteria are met:   Presented to the ED because of falls Syncope; Seizure; Loss of consciousness Patient's age greater than or equal to 70 and Clinician judgement    Age >70 y.o     Altered Mental Status (AMS); Intoxication with alcohol or substance Acute or chronic confusion; Disorientated; Impaired judgement; Poor safety awareness; Inability to follow instruction    Impaired Mobility Ambulates or transfers with assistive devices; Unable to ambulate or transfer; Acute or chronic injuries impairing on mobility    Nurse Judgement Unstable vital signs; Orthostatic hypotension; Medications (Sedatives/Narcotics/Diuretics etc); Weakness; Neurological/Sensory deficits; Dizziness/Vertigo; Bowel/Bladder incontinence; Diarrhea; Urinary frequency    Screening Results: Positive   If positive screening, select interventions implemented: Falls Bundle initiated  Environmental Checks Carried Out  Standard Precautions in Place        REDUCTION INTERNAL FIXATION HIP BIPOLAR Right 5/1/2019    Procedure: HEMIARTHROPLASTY, RIGHT HIP, BIPOLAR;  Surgeon: Seb Prado MD;  Location:  OR       SH:  , lives with her  in Memory Care unit.  They have lived at  since 10/2017.      They have 2 sons and a daughter     Family History   Problem Relation Age of Onset     Cancer Mother         pancreatic     Heart Disease Father 83        MI     Cancer Brother         renal cell     ROS: h/o recurrent UTIs, tx'd in June 2018 and February 2019      Has alternating constipation and diarrhea, constipation most recently    CPT 4.4    SLUMS 14/30    Normal stress ECHO in [place], [place] 2014   No recent CP, dyspnea or CV symptoms, but very sedentary.       GENERAL APPEARANCE: moderate distress  /83   Pulse 98   Temp 98.3  F (36.8  C)   Resp 20   Wt 55.8 kg (123 lb)   SpO2 94%   BMI 21.11 kg/m      HEENT: normocephalic, no lesion or abnormalities  NECK: no adenopathy, no asymmetry, masses, or scars and thyroid normal to palpation  RESP: decreased BS, few coarse crackles  CV: regular rate and rhythm, normal S1 S2, occ ectopy  ABDOMEN:  soft, nontender, no HSM or masses and bowel sounds normal  MS: no ext edema  SKIN: no suspicious lesions or rashes; fragile skin.  Incision healing, steri-strips intact.   Bruising, mild edema  NEURO: Normal strength and tone, sensory exam grossly normal, and speech normal  PSYCH: affect anxious  LYMPHATICS: No cervical,  or supraclavicular nodes     Last Comprehensive Metabolic Panel:  Sodium   Date Value Ref Range Status   05/02/2019 140 133 - 144 mmol/L Final     Potassium   Date Value Ref Range Status   05/02/2019 4.6 3.4 - 5.3 mmol/L Final     Chloride   Date Value Ref Range Status   05/02/2019 110 (H) 94 - 109 mmol/L Final     Carbon Dioxide   Date Value Ref Range Status   05/02/2019 23 20 - 32 mmol/L Final     Anion Gap   Date Value Ref Range Status   05/02/2019 7 3 - 14 mmol/L Final      Glucose   Date Value Ref Range Status   05/03/2019 107 (H) 70 - 99 mg/dL Final     Urea Nitrogen   Date Value Ref Range Status   05/02/2019 31 (H) 7 - 30 mg/dL Final     Creatinine   Date Value Ref Range Status   05/04/2019 0.95 0.52 - 1.04 mg/dL Final     GFR Estimate   Date Value Ref Range Status   05/04/2019 52 (L) >60 mL/min/[1.73_m2] Final     Comment:     Non  GFR Calc  Starting 12/18/2018, serum creatinine based estimated GFR (eGFR) will be   calculated using the Chronic Kidney Disease Epidemiology Collaboration   (CKD-EPI) equation.       Calcium   Date Value Ref Range Status   05/02/2019 8.4 (L) 8.5 - 10.1 mg/dL Final     Lab Results   Component Value Date    WBC 10.0 05/01/2019      HGB 8.4 05/04/2019      MCV 95 05/01/2019       05/04/2019        IMP/PLAN:     (S72.001D) Closed fracture of neck of right femur with routine healing, subsequent encounter  (primary encounter diagnosis)  (Z96.649) S/P hip hemiarthroplasty  Comment: hip pain, impaired mobility    Plan: acetaminophen and oxycodone for pain management  DVT prophylaxis with 2 week course of Lovenox  Monitor incision.        (G30.1,  F02.80) Late onset Alzheimer's disease without behavioral disturbance  Comment: advanced stages, loss of language and low functional status     Plan: HOSPICE REFERRAL        Family looking to comfort approach in light of multiple comorbidities and declining status       (R13.10) Dysphagia, unspecified type  Comment: choking episode    Plan: HOSPICE REFERRAL        Soft foods, preferences, oral swabs       (D62) Anemia due to blood loss, acute  Comment: received IV Fe in the hospital  Plan: no further rechecks given goals of care.        (I12.9,  N18.3) Benign hypertension with chronic kidney disease, stage III    Comment: variable bps GFR 52  BP Readings from Last 3 Encounters:   05/06/19 166/83   05/05/19 166/83   05/04/19 150/65    Plan: continue amlodipine and lisinopril, follow bps      (I25.10) Coronary artery disease involving native heart without angina pectoris, unspecified vessel or lesion type  Comment: past h/o MI  Plan: remains on a statin for secondary prevention; can look to discontinuation given goals of care.         (R19.8) Alternating constipation and diarrhea  Comment: ongoing, seems to be well managed at this point    Plan: bowel regimen        Advanced directives: DNR/DNI, Hospice referral    Mckayla Huitron MD       Sincerely,        Mckayla Huitron MD

## (undated) DEVICE — MANIFOLD NEPTUNE 4 PORT 700-20

## (undated) DEVICE — PREP CHLORAPREP 26ML TINTED ORANGE  260815

## (undated) DEVICE — PAD FOAM MCGUIRE KIT 0814-0150

## (undated) DEVICE — GLOVE PROTEXIS POWDER FREE 7.5 ORTHOPEDIC 2D73ET75

## (undated) DEVICE — GLOVE PROTEXIS W/NEU-THERA 7.5  2D73TE75

## (undated) DEVICE — GLOVE PROTEXIS POWDER FREE 7.0 ORTHOPEDIC 2D73ET70

## (undated) DEVICE — DRSG ADAPTIC 3X8" 6113

## (undated) DEVICE — SOL NACL 0.9% IRRIG 1000ML BOTTLE 2F7124

## (undated) DEVICE — PACK TOTAL HIP W/POUCH SOP15HPFSM

## (undated) DEVICE — SOL BENZOIN 0.5OZ

## (undated) DEVICE — DRAPE IOBAN INCISE 23X17" 6650EZ

## (undated) DEVICE — ESU ELEC BLADE 6" COATED E1450-6

## (undated) DEVICE — SOL WATER IRRIG 1000ML BOTTLE 2F7114

## (undated) DEVICE — DRSG AQUACEL AG 3.5X6.0" HYDROFIBER 412010

## (undated) DEVICE — STPL SKIN SUBCUTICULAR INSORB 1025

## (undated) DEVICE — LINEN TOWEL PACK X5 5464

## (undated) DEVICE — BLADE KNIFE SURG 10 371110

## (undated) DEVICE — IMM PILLOW ABDUCT HIP MED 31143061

## (undated) DEVICE — ESU GROUND PAD UNIVERSAL W/O CORD

## (undated) DEVICE — DRSG STERI STRIP 1/2X4" R1547

## (undated) DEVICE — DRSG KERLIX FLUFFS X5

## (undated) DEVICE — BLADE SAW SAGITTAL STRK 25X90X1.27MM HD SYS 6 6125-127-090

## (undated) DEVICE — GLOVE PROTEXIS BLUE W/NEU-THERA 7.5  2D73EB75

## (undated) DEVICE — DRSG AQUACEL AG 3.5X9.75" HYDROFIBER 412011

## (undated) DEVICE — SPONGE LAP 18X18" X8435

## (undated) DEVICE — SU VICRYL 1 CP-1 27" J468H

## (undated) RX ORDER — GLYCOPYRROLATE 0.2 MG/ML
INJECTION, SOLUTION INTRAMUSCULAR; INTRAVENOUS
Status: DISPENSED
Start: 2019-01-01

## (undated) RX ORDER — FENTANYL CITRATE 50 UG/ML
INJECTION, SOLUTION INTRAMUSCULAR; INTRAVENOUS
Status: DISPENSED
Start: 2019-01-01

## (undated) RX ORDER — PROPOFOL 10 MG/ML
INJECTION, EMULSION INTRAVENOUS
Status: DISPENSED
Start: 2019-01-01

## (undated) RX ORDER — DEXAMETHASONE SODIUM PHOSPHATE 4 MG/ML
INJECTION, SOLUTION INTRA-ARTICULAR; INTRALESIONAL; INTRAMUSCULAR; INTRAVENOUS; SOFT TISSUE
Status: DISPENSED
Start: 2019-01-01

## (undated) RX ORDER — HYDROMORPHONE HYDROCHLORIDE 1 MG/ML
INJECTION, SOLUTION INTRAMUSCULAR; INTRAVENOUS; SUBCUTANEOUS
Status: DISPENSED
Start: 2019-01-01

## (undated) RX ORDER — ONDANSETRON 2 MG/ML
INJECTION INTRAMUSCULAR; INTRAVENOUS
Status: DISPENSED
Start: 2019-01-01